# Patient Record
Sex: MALE | Race: WHITE | NOT HISPANIC OR LATINO | Employment: UNEMPLOYED | URBAN - METROPOLITAN AREA
[De-identification: names, ages, dates, MRNs, and addresses within clinical notes are randomized per-mention and may not be internally consistent; named-entity substitution may affect disease eponyms.]

---

## 2018-03-08 ENCOUNTER — DOCUMENTATION (OUTPATIENT)
Dept: PULMONOLOGY | Facility: HOSPITAL | Age: 83
End: 2018-03-08

## 2018-04-17 ENCOUNTER — APPOINTMENT (OUTPATIENT)
Dept: LAB | Facility: HOSPITAL | Age: 83
End: 2018-04-17
Attending: INTERNAL MEDICINE
Payer: COMMERCIAL

## 2018-04-17 ENCOUNTER — TRANSCRIBE ORDERS (OUTPATIENT)
Dept: LAB | Facility: HOSPITAL | Age: 83
End: 2018-04-17

## 2018-04-17 DIAGNOSIS — C61 MALIGNANT NEOPLASM OF PROSTATE (CMS/HCC): ICD-10-CM

## 2018-04-17 DIAGNOSIS — N13.8 ENLARGED PROSTATE WITH URINARY OBSTRUCTION: ICD-10-CM

## 2018-04-17 DIAGNOSIS — K57.92 DIVERTICULITIS OF INTESTINE WITHOUT PERFORATION OR ABSCESS WITHOUT BLEEDING, UNSPECIFIED PART OF INTESTINAL TRACT: ICD-10-CM

## 2018-04-17 DIAGNOSIS — I10 ESSENTIAL HYPERTENSION, MALIGNANT: ICD-10-CM

## 2018-04-17 DIAGNOSIS — E87.5 HYPERPOTASSEMIA: ICD-10-CM

## 2018-04-17 DIAGNOSIS — J18.9 UNRESOLVED PNEUMONIA: ICD-10-CM

## 2018-04-17 DIAGNOSIS — J44.89 OBSTRUCTIVE CHRONIC BRONCHITIS WITHOUT EXACERBATION (CMS/HCC): ICD-10-CM

## 2018-04-17 DIAGNOSIS — N17.9 ACUTE RENAL FAILURE, UNSPECIFIED ACUTE RENAL FAILURE TYPE (CMS/HCC): Primary | ICD-10-CM

## 2018-04-17 DIAGNOSIS — N40.1 ENLARGED PROSTATE WITH URINARY OBSTRUCTION: ICD-10-CM

## 2018-04-17 DIAGNOSIS — N17.9 ACUTE RENAL FAILURE, UNSPECIFIED ACUTE RENAL FAILURE TYPE (CMS/HCC): ICD-10-CM

## 2018-04-17 LAB
ALBUMIN SERPL-MCNC: 3.6 G/DL (ref 3.4–5)
ALP SERPL-CCNC: 58 IU/L (ref 35–126)
ALT SERPL-CCNC: 17 IU/L (ref 16–63)
ANION GAP SERPL CALC-SCNC: 8 MEQ/L (ref 3–15)
AST SERPL-CCNC: 20 IU/L (ref 15–41)
BILIRUB SERPL-MCNC: 0.6 MG/DL (ref 0.3–1.2)
BUN SERPL-MCNC: 18 MG/DL (ref 8–20)
CALCIUM SERPL-MCNC: 9.3 MG/DL (ref 8.9–10.3)
CHLORIDE SERPL-SCNC: 104 MMOL/L (ref 98–109)
CO2 SERPL-SCNC: 26 MMOL/L (ref 22–32)
CREAT SERPL-MCNC: 1.6 MG/DL (ref 0.8–1.3)
GFR SERPL CREATININE-BSD FRML MDRD: 41.8 ML/MIN/1.73M*2
GLUCOSE SERPL-MCNC: 104 MG/DL (ref 70–99)
POTASSIUM SERPL-SCNC: 4.7 MMOL/L (ref 3.6–5.1)
PROT SERPL-MCNC: 6.2 G/DL (ref 6–8.2)
SODIUM SERPL-SCNC: 138 MMOL/L (ref 136–144)

## 2018-04-17 PROCEDURE — 36415 COLL VENOUS BLD VENIPUNCTURE: CPT

## 2018-04-17 PROCEDURE — 80053 COMPREHEN METABOLIC PANEL: CPT

## 2018-04-24 ENCOUNTER — TRANSCRIBE ORDERS (OUTPATIENT)
Dept: SCHEDULING | Age: 83
End: 2018-04-24

## 2018-04-24 DIAGNOSIS — R91.8 LUNG MASS: Primary | ICD-10-CM

## 2018-04-30 ENCOUNTER — TRANSCRIBE ORDERS (OUTPATIENT)
Dept: LAB | Facility: HOSPITAL | Age: 83
End: 2018-04-30

## 2018-04-30 ENCOUNTER — APPOINTMENT (OUTPATIENT)
Dept: LAB | Facility: HOSPITAL | Age: 83
End: 2018-04-30
Attending: INTERNAL MEDICINE
Payer: COMMERCIAL

## 2018-04-30 DIAGNOSIS — N17.9 ACUTE RENAL FAILURE, UNSPECIFIED ACUTE RENAL FAILURE TYPE (CMS/HCC): ICD-10-CM

## 2018-04-30 DIAGNOSIS — N18.30 CHRONIC KIDNEY DISEASE, STAGE III (MODERATE) (CMS/HCC): Primary | ICD-10-CM

## 2018-04-30 DIAGNOSIS — N18.30 CHRONIC KIDNEY DISEASE, STAGE III (MODERATE) (CMS/HCC): ICD-10-CM

## 2018-04-30 LAB
ANION GAP SERPL CALC-SCNC: 9 MEQ/L (ref 3–15)
BUN SERPL-MCNC: 32 MG/DL (ref 8–20)
CALCIUM SERPL-MCNC: 9.3 MG/DL (ref 8.9–10.3)
CHLORIDE SERPL-SCNC: 100 MMOL/L (ref 98–109)
CO2 SERPL-SCNC: 28 MMOL/L (ref 22–32)
CREAT SERPL-MCNC: 1.5 MG/DL (ref 0.8–1.3)
GFR SERPL CREATININE-BSD FRML MDRD: 45 ML/MIN/1.73M*2
GLUCOSE SERPL-MCNC: 120 MG/DL (ref 70–99)
POTASSIUM SERPL-SCNC: 4.6 MMOL/L (ref 3.6–5.1)
SODIUM SERPL-SCNC: 137 MMOL/L (ref 136–144)

## 2018-04-30 PROCEDURE — 36415 COLL VENOUS BLD VENIPUNCTURE: CPT

## 2018-04-30 PROCEDURE — 80048 BASIC METABOLIC PNL TOTAL CA: CPT

## 2018-05-25 ENCOUNTER — ANCILLARY ORDERS (OUTPATIENT)
Dept: LAB | Facility: HOSPITAL | Age: 83
End: 2018-05-25
Payer: COMMERCIAL

## 2018-05-25 DIAGNOSIS — N17.9 ACUTE RENAL FAILURE, UNSPECIFIED ACUTE RENAL FAILURE TYPE (CMS/HCC): ICD-10-CM

## 2018-05-25 DIAGNOSIS — N18.30 CHRONIC KIDNEY DISEASE, STAGE III (MODERATE) (CMS/HCC): ICD-10-CM

## 2018-06-07 ENCOUNTER — APPOINTMENT (RX ONLY)
Dept: URBAN - METROPOLITAN AREA CLINIC 23 | Facility: CLINIC | Age: 83
Setting detail: DERMATOLOGY
End: 2018-06-07

## 2018-06-07 DIAGNOSIS — L56.8 OTHER SPECIFIED ACUTE SKIN CHANGES DUE TO ULTRAVIOLET RADIATION: ICD-10-CM

## 2018-06-07 DIAGNOSIS — D18.0 HEMANGIOMA: ICD-10-CM

## 2018-06-07 DIAGNOSIS — L81.4 OTHER MELANIN HYPERPIGMENTATION: ICD-10-CM

## 2018-06-07 DIAGNOSIS — Z85.828 PERSONAL HISTORY OF OTHER MALIGNANT NEOPLASM OF SKIN: ICD-10-CM | Status: RESOLVED

## 2018-06-07 DIAGNOSIS — L57.0 ACTINIC KERATOSIS: ICD-10-CM

## 2018-06-07 PROBLEM — D48.5 NEOPLASM OF UNCERTAIN BEHAVIOR OF SKIN: Status: ACTIVE | Noted: 2018-06-07

## 2018-06-07 PROBLEM — D18.01 HEMANGIOMA OF SKIN AND SUBCUTANEOUS TISSUE: Status: ACTIVE | Noted: 2018-06-07

## 2018-06-07 PROBLEM — L40.0 PSORIASIS VULGARIS: Status: ACTIVE | Noted: 2018-06-07

## 2018-06-07 PROBLEM — Z85.46 PERSONAL HISTORY OF MALIGNANT NEOPLASM OF PROSTATE: Status: ACTIVE | Noted: 2018-06-07

## 2018-06-07 PROCEDURE — ? LIQUID NITROGEN

## 2018-06-07 PROCEDURE — 99214 OFFICE O/P EST MOD 30 MIN: CPT | Mod: 25

## 2018-06-07 PROCEDURE — 17003 DESTRUCT PREMALG LES 2-14: CPT

## 2018-06-07 PROCEDURE — 17000 DESTRUCT PREMALG LESION: CPT

## 2018-06-07 PROCEDURE — ? BIOPSY BY SHAVE METHOD

## 2018-06-07 PROCEDURE — ? SUNSCREEN RECOMMENDATIONS

## 2018-06-07 PROCEDURE — 11100: CPT | Mod: 59

## 2018-06-07 PROCEDURE — ? COUNSELING

## 2018-06-07 ASSESSMENT — LOCATION DETAILED DESCRIPTION DERM
LOCATION DETAILED: RIGHT ULNAR DORSAL HAND
LOCATION DETAILED: RIGHT VENTRAL PROXIMAL FOREARM
LOCATION DETAILED: RIGHT SUPERIOR MEDIAL UPPER BACK
LOCATION DETAILED: PERIUMBILICAL SKIN
LOCATION DETAILED: RIGHT PROXIMAL POSTERIOR THIGH
LOCATION DETAILED: LEFT RADIAL DORSAL HAND
LOCATION DETAILED: RIGHT ANTERIOR PROXIMAL THIGH
LOCATION DETAILED: LEFT PROXIMAL DORSAL FOREARM
LOCATION DETAILED: LEFT PROXIMAL POSTERIOR THIGH
LOCATION DETAILED: RIGHT PROXIMAL DORSAL FOREARM
LOCATION DETAILED: LEFT VENTRAL PROXIMAL FOREARM
LOCATION DETAILED: LEFT ANTERIOR PROXIMAL THIGH
LOCATION DETAILED: RIGHT RADIAL DORSAL HAND
LOCATION DETAILED: LEFT SUPERIOR UPPER BACK
LOCATION DETAILED: 3RD WEB SPACE RIGHT HAND
LOCATION DETAILED: LEFT ULNAR DORSAL HAND

## 2018-06-07 ASSESSMENT — LOCATION SIMPLE DESCRIPTION DERM
LOCATION SIMPLE: RIGHT HAND
LOCATION SIMPLE: LEFT POSTERIOR THIGH
LOCATION SIMPLE: LEFT FOREARM
LOCATION SIMPLE: LEFT HAND
LOCATION SIMPLE: RIGHT THIGH
LOCATION SIMPLE: LEFT THIGH
LOCATION SIMPLE: LEFT UPPER BACK
LOCATION SIMPLE: RIGHT FOREARM
LOCATION SIMPLE: ABDOMEN
LOCATION SIMPLE: RIGHT POSTERIOR THIGH
LOCATION SIMPLE: RIGHT UPPER BACK

## 2018-06-07 ASSESSMENT — LOCATION ZONE DERM
LOCATION ZONE: TRUNK
LOCATION ZONE: ARM
LOCATION ZONE: HAND
LOCATION ZONE: LEG

## 2018-06-07 NOTE — PROCEDURE: BIOPSY BY SHAVE METHOD
Silver Nitrate Text: The wound bed was treated with silver nitrate after the biopsy was performed.
Anesthesia Type: 1% lidocaine with epinephrine
Lab Facility: 3
Post-Care Instructions: I reviewed with the patient in detail post-care instructions. Patient is to keep the biopsy site dry overnight, and then apply bacitracin twice daily until healed. Patient may apply hydrogen peroxide soaks to remove any crusting.
Billing Type: Third-Party Bill
Hemostasis: Aluminum Chloride
Consent: Written consent was obtained and risks were reviewed including but not limited to scarring, infection, bleeding, scabbing, incomplete removal, nerve damage and allergy to anesthesia.
Additional Anesthesia Volume In Cc (Will Not Render If 0): 0
Bill 42563 For Specimen Handling/Conveyance To Laboratory?: no
Type Of Destruction Used: Curettage
Cryotherapy Text: The wound bed was treated with cryotherapy after the biopsy was performed.
Anesthesia Volume In Cc (Will Not Render If 0): 0.5
Notification Instructions: Patient will be notified of biopsy results. However, patient instructed to call the office if not contacted within 2 weeks.
Detail Level: Detailed
Size Of Lesion In Cm: 0.7
Biopsy Method: Dermablade
Dressing: bandage
Electrodesiccation And Curettage Text: The wound bed was treated with electrodesiccation and curettage after the biopsy was performed.
Biopsy Type: H and E
Curettage Text: The wound bed was treated with curettage after the biopsy was performed.
Was A Bandage Applied: Yes
Wound Care: Vaseline
Electrodesiccation Text: The wound bed was treated with electrodesiccation after the biopsy was performed.

## 2018-07-17 ENCOUNTER — APPOINTMENT (RX ONLY)
Dept: URBAN - METROPOLITAN AREA CLINIC 23 | Facility: CLINIC | Age: 83
Setting detail: DERMATOLOGY
End: 2018-07-17

## 2018-07-17 PROBLEM — D04.71 CARCINOMA IN SITU OF SKIN OF RIGHT LOWER LIMB, INCLUDING HIP: Status: ACTIVE | Noted: 2018-07-17

## 2018-07-17 PROCEDURE — 11602 EXC TR-EXT MAL+MARG 1.1-2 CM: CPT

## 2018-07-17 PROCEDURE — 13121 CMPLX RPR S/A/L 2.6-7.5 CM: CPT

## 2018-07-17 PROCEDURE — ? EXCISION

## 2018-07-17 PROCEDURE — ? PRESCRIPTION

## 2018-07-17 RX ORDER — MUPIROCIN 20 MG/G
OINTMENT TOPICAL
Qty: 1 | Refills: 0 | Status: ERX | COMMUNITY
Start: 2018-07-17

## 2018-07-17 RX ADMIN — MUPIROCIN: 20 OINTMENT TOPICAL at 12:41

## 2018-07-30 ENCOUNTER — APPOINTMENT (RX ONLY)
Dept: URBAN - METROPOLITAN AREA CLINIC 23 | Facility: CLINIC | Age: 83
Setting detail: DERMATOLOGY
End: 2018-07-30

## 2018-07-30 DIAGNOSIS — Z48.02 ENCOUNTER FOR REMOVAL OF SUTURES: ICD-10-CM

## 2018-07-30 PROCEDURE — ? SUTURE REMOVAL (GLOBAL PERIOD)

## 2018-07-30 ASSESSMENT — LOCATION DETAILED DESCRIPTION DERM: LOCATION DETAILED: LEFT DISTAL CALF

## 2018-07-30 ASSESSMENT — LOCATION SIMPLE DESCRIPTION DERM: LOCATION SIMPLE: LEFT CALF

## 2018-07-30 ASSESSMENT — LOCATION ZONE DERM: LOCATION ZONE: LEG

## 2018-07-30 NOTE — PROCEDURE: SUTURE REMOVAL (GLOBAL PERIOD)
Detail Level: Simple
Add 67003 Cpt? (Important Note: In 2017 The Use Of 80214 Is Being Tracked By Cms To Determine Future Global Period Reimbursement For Global Periods): no

## 2018-08-13 ENCOUNTER — HOSPITAL ENCOUNTER (OUTPATIENT)
Dept: RADIOLOGY | Facility: HOSPITAL | Age: 83
Discharge: HOME | End: 2018-08-13
Attending: INTERNAL MEDICINE
Payer: COMMERCIAL

## 2018-08-13 ENCOUNTER — APPOINTMENT (OUTPATIENT)
Dept: LAB | Facility: HOSPITAL | Age: 83
End: 2018-08-13
Attending: INTERNAL MEDICINE
Payer: COMMERCIAL

## 2018-08-13 ENCOUNTER — TRANSCRIBE ORDERS (OUTPATIENT)
Dept: LAB | Facility: HOSPITAL | Age: 83
End: 2018-08-13

## 2018-08-13 DIAGNOSIS — J18.9 PNEUMONIA: ICD-10-CM

## 2018-08-13 DIAGNOSIS — J44.9 CHRONIC OBSTRUCTIVE PULMONARY DISEASE (CMS/HCC): ICD-10-CM

## 2018-08-13 DIAGNOSIS — N40.1 ENLARGED PROSTATE WITH LOWER URINARY TRACT SYMPTOMS (LUTS): ICD-10-CM

## 2018-08-13 DIAGNOSIS — I10 ESSENTIAL (PRIMARY) HYPERTENSION: ICD-10-CM

## 2018-08-13 DIAGNOSIS — C61 MALIGNANT NEOPLASM OF PROSTATE (CMS/HCC): ICD-10-CM

## 2018-08-13 DIAGNOSIS — E87.5 HYPERKALEMIA: ICD-10-CM

## 2018-08-13 DIAGNOSIS — N17.9 ACUTE KIDNEY FAILURE (CMS/HCC): Primary | ICD-10-CM

## 2018-08-13 DIAGNOSIS — N17.9 ACUTE KIDNEY FAILURE (CMS/HCC): ICD-10-CM

## 2018-08-13 DIAGNOSIS — R91.8 LUNG MASS: ICD-10-CM

## 2018-08-13 DIAGNOSIS — K57.92 DIVERTICULITIS OF INTESTINE WITHOUT PERFORATION OR ABSCESS WITHOUT BLEEDING: ICD-10-CM

## 2018-08-13 LAB
ALBUMIN SERPL-MCNC: 3.8 G/DL (ref 3.4–5)
ALP SERPL-CCNC: 74 IU/L (ref 35–126)
ALT SERPL-CCNC: 16 IU/L (ref 16–63)
ANION GAP SERPL CALC-SCNC: 9 MEQ/L (ref 3–15)
AST SERPL-CCNC: 18 IU/L (ref 15–41)
BASOPHILS # BLD: 0.08 K/UL (ref 0.01–0.1)
BASOPHILS NFR BLD: 0.7 %
BILIRUB SERPL-MCNC: 0.8 MG/DL (ref 0.3–1.2)
BILIRUB UR QL STRIP.AUTO: NEGATIVE MG/DL
BUN SERPL-MCNC: 26 MG/DL (ref 8–20)
CALCIUM SERPL-MCNC: 9.6 MG/DL (ref 8.9–10.3)
CHLORIDE SERPL-SCNC: 102 MMOL/L (ref 98–109)
CLARITY UR REFRACT.AUTO: CLEAR
CO2 SERPL-SCNC: 29 MMOL/L (ref 22–32)
COLOR UR AUTO: YELLOW
CREAT SERPL-MCNC: 1.6 MG/DL (ref 0.8–1.3)
CREAT UR-MCNC: 91.6 MG/DL
DIFFERENTIAL METHOD BLD: ABNORMAL
EOSINOPHIL # BLD: 0.52 K/UL (ref 0.04–0.54)
EOSINOPHIL NFR BLD: 4.8 %
ERYTHROCYTE [DISTWIDTH] IN BLOOD BY AUTOMATED COUNT: 14.2 % (ref 11.6–14.4)
GFR SERPL CREATININE-BSD FRML MDRD: 41.8 ML/MIN/1.73M*2
GLUCOSE SERPL-MCNC: 100 MG/DL (ref 70–99)
GLUCOSE UR STRIP.AUTO-MCNC: NEGATIVE MG/DL
HCT VFR BLDCO AUTO: 43.7 % (ref 40.1–51)
HGB BLD-MCNC: 14.7 G/DL (ref 13.7–17.5)
HGB UR QL STRIP.AUTO: NEGATIVE
IMM GRANULOCYTES # BLD AUTO: 0.06 K/UL (ref 0–0.08)
IMM GRANULOCYTES NFR BLD AUTO: 0.6 %
KETONES UR STRIP.AUTO-MCNC: NEGATIVE MG/DL
LEUKOCYTE ESTERASE UR QL STRIP.AUTO: NEGATIVE
LYMPHOCYTES # BLD: 2.23 K/UL (ref 1.2–3.5)
LYMPHOCYTES NFR BLD: 20.6 %
MCH RBC QN AUTO: 31.6 PG (ref 28–33.2)
MCHC RBC AUTO-ENTMCNC: 33.6 G/DL (ref 32.2–36.5)
MCV RBC AUTO: 94 FL (ref 83–98)
MONOCYTES # BLD: 1.29 K/UL (ref 0.3–1)
MONOCYTES NFR BLD: 11.9 %
NEUTROPHILS # BLD: 6.63 K/UL (ref 1.7–7)
NEUTS SEG NFR BLD: 61.4 %
NITRITE UR QL STRIP.AUTO: NEGATIVE
NRBC BLD-RTO: 0 %
PDW BLD AUTO: 9.9 FL (ref 9.4–12.4)
PH UR STRIP.AUTO: 6 [PH]
PLATELET # BLD AUTO: 283 K/UL (ref 150–350)
POTASSIUM SERPL-SCNC: 4.4 MMOL/L (ref 3.6–5.1)
PROT SERPL-MCNC: 6.5 G/DL (ref 6–8.2)
PROT UR QL STRIP.AUTO: NEGATIVE
PROT UR-MCNC: 11 MG/DL
PROT/CREAT UR: 0.12 MG/G CREAT
RBC # BLD AUTO: 4.65 M/UL (ref 4.5–5.8)
SODIUM SERPL-SCNC: 140 MMOL/L (ref 136–144)
SP GR UR REFRACT.AUTO: 1.02
UROBILINOGEN UR STRIP-ACNC: 0.2 EU/DL
WBC # BLD AUTO: 10.81 K/UL (ref 3.8–10.5)

## 2018-08-13 PROCEDURE — 85025 COMPLETE CBC W/AUTO DIFF WBC: CPT

## 2018-08-13 PROCEDURE — 36415 COLL VENOUS BLD VENIPUNCTURE: CPT

## 2018-08-13 PROCEDURE — 71250 CT THORAX DX C-: CPT

## 2018-08-13 PROCEDURE — 84156 ASSAY OF PROTEIN URINE: CPT

## 2018-08-13 PROCEDURE — 80053 COMPREHEN METABOLIC PANEL: CPT

## 2018-08-13 PROCEDURE — 81003 URINALYSIS AUTO W/O SCOPE: CPT

## 2018-08-15 ENCOUNTER — TRANSCRIBE ORDERS (OUTPATIENT)
Dept: LAB | Facility: HOSPITAL | Age: 83
End: 2018-08-15

## 2018-08-15 ENCOUNTER — APPOINTMENT (OUTPATIENT)
Dept: LAB | Facility: HOSPITAL | Age: 83
End: 2018-08-15
Attending: NURSE PRACTITIONER
Payer: COMMERCIAL

## 2018-08-15 DIAGNOSIS — J18.1 LOBAR PNEUMONIA (CMS/HCC): ICD-10-CM

## 2018-08-15 DIAGNOSIS — J18.1 LOBAR PNEUMONIA (CMS/HCC): Primary | ICD-10-CM

## 2018-08-15 LAB
GRAM STN SPEC: NORMAL

## 2018-08-15 PROCEDURE — 87205 SMEAR GRAM STAIN: CPT

## 2018-08-15 PROCEDURE — 87102 FUNGUS ISOLATION CULTURE: CPT

## 2018-08-15 PROCEDURE — 87070 CULTURE OTHR SPECIMN AEROBIC: CPT

## 2018-08-15 PROCEDURE — 87206 SMEAR FLUORESCENT/ACID STAI: CPT | Mod: 59

## 2018-08-15 PROCEDURE — 87206 SMEAR FLUORESCENT/ACID STAI: CPT

## 2018-08-15 PROCEDURE — 87015 SPECIMEN INFECT AGNT CONCNTJ: CPT

## 2018-08-16 LAB
FUNGUS STAIN: NORMAL
RHODAMINE-AURAMINE STN SPEC: NORMAL

## 2018-08-17 LAB — MICROORGANISM SPEC CULT: NORMAL

## 2018-08-27 ENCOUNTER — HOSPITAL ENCOUNTER (OUTPATIENT)
Dept: RADIOLOGY | Facility: HOSPITAL | Age: 83
Discharge: HOME | End: 2018-08-27
Attending: INTERNAL MEDICINE
Payer: COMMERCIAL

## 2018-08-27 ENCOUNTER — TRANSCRIBE ORDERS (OUTPATIENT)
Dept: SCHEDULING | Age: 83
End: 2018-08-27

## 2018-08-27 DIAGNOSIS — R10.84 GENERALIZED ABDOMINAL PAIN: Primary | ICD-10-CM

## 2018-08-27 DIAGNOSIS — R10.84 GENERALIZED ABDOMINAL PAIN: ICD-10-CM

## 2018-08-27 PROCEDURE — 74018 RADEX ABDOMEN 1 VIEW: CPT

## 2018-09-13 LAB
FUNGUS SPEC CULT: ABNORMAL
FUNGUS SPEC CULT: ABNORMAL

## 2018-09-27 LAB — MYCOBACTERIUM SPEC CULT: NORMAL

## 2018-11-13 ENCOUNTER — TRANSCRIBE ORDERS (OUTPATIENT)
Dept: LAB | Facility: HOSPITAL | Age: 83
End: 2018-11-13

## 2018-11-13 ENCOUNTER — APPOINTMENT (OUTPATIENT)
Dept: LAB | Facility: HOSPITAL | Age: 83
End: 2018-11-13
Attending: INTERNAL MEDICINE
Payer: COMMERCIAL

## 2018-11-13 DIAGNOSIS — I10 ESSENTIAL (PRIMARY) HYPERTENSION: ICD-10-CM

## 2018-11-13 DIAGNOSIS — J44.9 CHRONIC OBSTRUCTIVE PULMONARY DISEASE (CMS/HCC): ICD-10-CM

## 2018-11-13 DIAGNOSIS — E87.5 HYPERKALEMIA: ICD-10-CM

## 2018-11-13 DIAGNOSIS — N40.1 ENLARGED PROSTATE WITH LOWER URINARY TRACT SYMPTOMS (LUTS): ICD-10-CM

## 2018-11-13 DIAGNOSIS — N17.9 ACUTE KIDNEY FAILURE (CMS/HCC): Primary | ICD-10-CM

## 2018-11-13 DIAGNOSIS — K57.92 DIVERTICULITIS OF INTESTINE WITHOUT PERFORATION OR ABSCESS WITHOUT BLEEDING: ICD-10-CM

## 2018-11-13 DIAGNOSIS — N17.9 ACUTE KIDNEY FAILURE (CMS/HCC): ICD-10-CM

## 2018-11-13 DIAGNOSIS — C61 MALIGNANT NEOPLASM OF PROSTATE (CMS/HCC): ICD-10-CM

## 2018-11-13 LAB
ALBUMIN SERPL-MCNC: 3.6 G/DL (ref 3.4–5)
ALP SERPL-CCNC: 66 IU/L (ref 35–126)
ALT SERPL-CCNC: 14 IU/L (ref 16–63)
ANION GAP SERPL CALC-SCNC: 9 MEQ/L (ref 3–15)
AST SERPL-CCNC: 15 IU/L (ref 15–41)
BACTERIA URNS QL MICRO: NORMAL /HPF
BASOPHILS # BLD: 0.07 K/UL (ref 0.01–0.1)
BASOPHILS NFR BLD: 0.6 %
BILIRUB SERPL-MCNC: 0.7 MG/DL (ref 0.3–1.2)
BILIRUB UR QL STRIP.AUTO: NEGATIVE MG/DL
BUN SERPL-MCNC: 27 MG/DL (ref 8–20)
CALCIUM SERPL-MCNC: 9.1 MG/DL (ref 8.9–10.3)
CHLORIDE SERPL-SCNC: 102 MEQ/L (ref 98–109)
CLARITY UR REFRACT.AUTO: CLEAR
CO2 SERPL-SCNC: 24 MEQ/L (ref 22–32)
COLOR UR AUTO: YELLOW
CREAT SERPL-MCNC: 1.4 MG/DL (ref 0.8–1.3)
CREAT UR-MCNC: 88.4 MG/DL
DIFFERENTIAL METHOD BLD: ABNORMAL
EOSINOPHIL # BLD: 0.08 K/UL (ref 0.04–0.54)
EOSINOPHIL NFR BLD: 0.7 %
ERYTHROCYTE [DISTWIDTH] IN BLOOD BY AUTOMATED COUNT: 13.2 % (ref 11.6–14.4)
GFR SERPL CREATININE-BSD FRML MDRD: 48.6 ML/MIN/1.73M*2
GLUCOSE SERPL-MCNC: 84 MG/DL (ref 70–99)
GLUCOSE UR STRIP.AUTO-MCNC: NEGATIVE MG/DL
HCT VFR BLDCO AUTO: 42.9 % (ref 40.1–51)
HGB BLD-MCNC: 14.6 G/DL (ref 13.7–17.5)
HGB UR QL STRIP.AUTO: NEGATIVE
HYALINE CASTS #/AREA URNS LPF: NORMAL /LPF
IMM GRANULOCYTES # BLD AUTO: 0.06 K/UL (ref 0–0.08)
IMM GRANULOCYTES NFR BLD AUTO: 0.5 %
KETONES UR STRIP.AUTO-MCNC: NEGATIVE MG/DL
LEUKOCYTE ESTERASE UR QL STRIP.AUTO: NEGATIVE
LYMPHOCYTES # BLD: 1.57 K/UL (ref 1.2–3.5)
LYMPHOCYTES NFR BLD: 12.9 %
MCH RBC QN AUTO: 31.4 PG (ref 28–33.2)
MCHC RBC AUTO-ENTMCNC: 34 G/DL (ref 32.2–36.5)
MCV RBC AUTO: 92.3 FL (ref 83–98)
MONOCYTES # BLD: 1.3 K/UL (ref 0.3–1)
MONOCYTES NFR BLD: 10.7 %
NEUTROPHILS # BLD: 9.06 K/UL (ref 1.7–7)
NEUTS SEG NFR BLD: 74.6 %
NITRITE UR QL STRIP.AUTO: NEGATIVE
NRBC BLD-RTO: 0 %
PDW BLD AUTO: 10.2 FL (ref 9.4–12.4)
PH UR STRIP.AUTO: 6 [PH]
PLATELET # BLD AUTO: 283 K/UL (ref 150–350)
POTASSIUM SERPL-SCNC: 4.4 MEQ/L (ref 3.6–5.1)
PROT SERPL-MCNC: 5.9 G/DL (ref 6–8.2)
PROT UR QL STRIP.AUTO: NEGATIVE
PROT UR-MCNC: 10 MG/DL
PROT/CREAT UR: 0.11 MG/G CREAT
RBC # BLD AUTO: 4.65 M/UL (ref 4.5–5.8)
RBC #/AREA URNS HPF: NORMAL /HPF
SODIUM SERPL-SCNC: 135 MEQ/L (ref 136–144)
SP GR UR REFRACT.AUTO: 1.02
SQUAMOUS URNS QL MICRO: NORMAL /HPF
UROBILINOGEN UR STRIP-ACNC: 0.2 EU/DL
WBC # BLD AUTO: 12.14 K/UL (ref 3.8–10.5)
WBC #/AREA URNS HPF: NORMAL /HPF

## 2018-11-13 PROCEDURE — 85025 COMPLETE CBC W/AUTO DIFF WBC: CPT

## 2018-11-13 PROCEDURE — 82570 ASSAY OF URINE CREATININE: CPT

## 2018-11-13 PROCEDURE — 80053 COMPREHEN METABOLIC PANEL: CPT

## 2018-11-13 PROCEDURE — 81003 URINALYSIS AUTO W/O SCOPE: CPT

## 2018-11-13 PROCEDURE — 36415 COLL VENOUS BLD VENIPUNCTURE: CPT

## 2018-11-21 ENCOUNTER — TRANSCRIBE ORDERS (OUTPATIENT)
Dept: SCHEDULING | Age: 83
End: 2018-11-21

## 2018-11-21 DIAGNOSIS — R91.8 OTHER NONSPECIFIC ABNORMAL FINDING OF LUNG FIELD: Primary | ICD-10-CM

## 2018-12-17 ENCOUNTER — HOSPITAL ENCOUNTER (OUTPATIENT)
Dept: RADIOLOGY | Facility: HOSPITAL | Age: 83
Discharge: HOME | End: 2018-12-17
Attending: INTERNAL MEDICINE
Payer: COMMERCIAL

## 2018-12-17 ENCOUNTER — TRANSCRIBE ORDERS (OUTPATIENT)
Dept: LAB | Facility: HOSPITAL | Age: 83
End: 2018-12-17

## 2018-12-17 ENCOUNTER — APPOINTMENT (OUTPATIENT)
Dept: LAB | Facility: HOSPITAL | Age: 83
End: 2018-12-17
Attending: INTERNAL MEDICINE
Payer: COMMERCIAL

## 2018-12-17 DIAGNOSIS — N40.1 ENLARGED PROSTATE WITH LOWER URINARY TRACT SYMPTOMS (LUTS): ICD-10-CM

## 2018-12-17 DIAGNOSIS — N17.9 ACUTE KIDNEY FAILURE (CMS/HCC): ICD-10-CM

## 2018-12-17 DIAGNOSIS — K57.92 DIVERTICULITIS OF INTESTINE WITHOUT PERFORATION OR ABSCESS WITHOUT BLEEDING: ICD-10-CM

## 2018-12-17 DIAGNOSIS — E87.5 HYPERKALEMIA: ICD-10-CM

## 2018-12-17 DIAGNOSIS — I10 ESSENTIAL (PRIMARY) HYPERTENSION: ICD-10-CM

## 2018-12-17 DIAGNOSIS — C61 MALIGNANT NEOPLASM OF PROSTATE (CMS/HCC): Primary | ICD-10-CM

## 2018-12-17 DIAGNOSIS — J44.9 CHRONIC OBSTRUCTIVE PULMONARY DISEASE (CMS/HCC): ICD-10-CM

## 2018-12-17 DIAGNOSIS — R91.8 OTHER NONSPECIFIC ABNORMAL FINDING OF LUNG FIELD: ICD-10-CM

## 2018-12-17 DIAGNOSIS — N18.30 CHRONIC KIDNEY DISEASE, STAGE III (MODERATE) (CMS/HCC): ICD-10-CM

## 2018-12-17 DIAGNOSIS — J18.9 PNEUMONIA: ICD-10-CM

## 2018-12-17 DIAGNOSIS — C61 MALIGNANT NEOPLASM OF PROSTATE (CMS/HCC): ICD-10-CM

## 2018-12-17 LAB
ANION GAP SERPL CALC-SCNC: 8 MEQ/L (ref 3–15)
BUN SERPL-MCNC: 29 MG/DL (ref 8–20)
CALCIUM SERPL-MCNC: 8.9 MG/DL (ref 8.9–10.3)
CHLORIDE SERPL-SCNC: 105 MEQ/L (ref 98–109)
CO2 SERPL-SCNC: 27 MEQ/L (ref 22–32)
CREAT SERPL-MCNC: 1.5 MG/DL
GFR SERPL CREATININE-BSD FRML MDRD: 44.9 ML/MIN/1.73M*2
GLUCOSE SERPL-MCNC: 100 MG/DL (ref 70–99)
POTASSIUM SERPL-SCNC: 4.5 MEQ/L (ref 3.6–5.1)
SODIUM SERPL-SCNC: 140 MEQ/L (ref 136–144)

## 2018-12-17 PROCEDURE — 80048 BASIC METABOLIC PNL TOTAL CA: CPT | Performed by: INTERNAL MEDICINE

## 2018-12-17 PROCEDURE — 36415 COLL VENOUS BLD VENIPUNCTURE: CPT | Performed by: INTERNAL MEDICINE

## 2018-12-17 PROCEDURE — 71250 CT THORAX DX C-: CPT

## 2019-04-11 ENCOUNTER — APPOINTMENT (EMERGENCY)
Dept: RADIOLOGY | Facility: HOSPITAL | Age: 84
DRG: 191 | End: 2019-04-11
Payer: COMMERCIAL

## 2019-04-11 ENCOUNTER — HOSPITAL ENCOUNTER (INPATIENT)
Facility: HOSPITAL | Age: 84
LOS: 4 days | Discharge: HOME | DRG: 191 | End: 2019-04-15
Attending: EMERGENCY MEDICINE | Admitting: INTERNAL MEDICINE
Payer: COMMERCIAL

## 2019-04-11 ENCOUNTER — APPOINTMENT (INPATIENT)
Dept: RADIOLOGY | Facility: HOSPITAL | Age: 84
DRG: 191 | End: 2019-04-11
Attending: EMERGENCY MEDICINE
Payer: COMMERCIAL

## 2019-04-11 DIAGNOSIS — J44.1 COPD EXACERBATION (CMS/HCC): Primary | ICD-10-CM

## 2019-04-11 PROBLEM — I10 HYPERTENSION: Status: ACTIVE | Noted: 2019-04-11

## 2019-04-11 PROBLEM — N18.30 CHRONIC KIDNEY DISEASE (CKD), STAGE III (MODERATE) (CMS/HCC): Status: ACTIVE | Noted: 2019-04-11

## 2019-04-11 PROBLEM — R10.9 ABDOMINAL PAIN: Status: ACTIVE | Noted: 2019-04-11

## 2019-04-11 LAB
ANION GAP SERPL CALC-SCNC: 11 MEQ/L (ref 3–15)
BASOPHILS # BLD: 0.04 K/UL (ref 0.01–0.1)
BASOPHILS NFR BLD: 0.4 %
BNP SERPL-MCNC: 74 PG/ML
BUN SERPL-MCNC: 21 MG/DL (ref 8–20)
CALCIUM SERPL-MCNC: 8.8 MG/DL (ref 8.9–10.3)
CHLORIDE SERPL-SCNC: 103 MEQ/L (ref 98–109)
CO2 SERPL-SCNC: 24 MEQ/L (ref 22–32)
CREAT SERPL-MCNC: 1.2 MG/DL
DIFFERENTIAL METHOD BLD: ABNORMAL
EOSINOPHIL # BLD: 0.4 K/UL (ref 0.04–0.54)
EOSINOPHIL NFR BLD: 3.6 %
ERYTHROCYTE [DISTWIDTH] IN BLOOD BY AUTOMATED COUNT: 13.5 % (ref 11.6–14.4)
GFR SERPL CREATININE-BSD FRML MDRD: 58.1 ML/MIN/1.73M*2
GLUCOSE SERPL-MCNC: 111 MG/DL (ref 70–99)
HCT VFR BLDCO AUTO: 43.1 %
HGB BLD-MCNC: 14.5 G/DL
IMM GRANULOCYTES # BLD AUTO: 0.07 K/UL (ref 0–0.08)
IMM GRANULOCYTES NFR BLD AUTO: 0.6 %
LIPASE SERPL-CCNC: 26 U/L (ref 20–51)
LYMPHOCYTES # BLD: 1.15 K/UL (ref 1.2–3.5)
LYMPHOCYTES NFR BLD: 10.5 %
MCH RBC QN AUTO: 32.4 PG (ref 28–33.2)
MCHC RBC AUTO-ENTMCNC: 33.6 G/DL (ref 32.2–36.5)
MCV RBC AUTO: 96.2 FL (ref 83–98)
MONOCYTES # BLD: 1.03 K/UL (ref 0.3–1)
MONOCYTES NFR BLD: 9.4 %
NEUTROPHILS # BLD: 8.28 K/UL (ref 1.7–7)
NEUTS SEG NFR BLD: 75.5 %
NRBC BLD-RTO: 0 %
PDW BLD AUTO: 9.3 FL (ref 9.4–12.4)
PLATELET # BLD AUTO: 314 K/UL
POTASSIUM SERPL-SCNC: 3.9 MEQ/L (ref 3.6–5.1)
RBC # BLD AUTO: 4.48 M/UL (ref 4.5–5.8)
SODIUM SERPL-SCNC: 138 MEQ/L (ref 136–144)
TROPONIN I SERPL-MCNC: <0.03 NG/ML
WBC # BLD AUTO: 10.97 K/UL

## 2019-04-11 PROCEDURE — 84484 ASSAY OF TROPONIN QUANT: CPT

## 2019-04-11 PROCEDURE — 36415 COLL VENOUS BLD VENIPUNCTURE: CPT

## 2019-04-11 PROCEDURE — 85025 COMPLETE CBC W/AUTO DIFF WBC: CPT

## 2019-04-11 PROCEDURE — 83690 ASSAY OF LIPASE: CPT | Performed by: STUDENT IN AN ORGANIZED HEALTH CARE EDUCATION/TRAINING PROGRAM

## 2019-04-11 PROCEDURE — 93005 ELECTROCARDIOGRAM TRACING: CPT

## 2019-04-11 PROCEDURE — 85025 COMPLETE CBC W/AUTO DIFF WBC: CPT | Performed by: EMERGENCY MEDICINE

## 2019-04-11 PROCEDURE — 25000000 HC PHARMACY GENERAL: Performed by: EMERGENCY MEDICINE

## 2019-04-11 PROCEDURE — 63600000 HC DRUGS/DETAIL CODE: Performed by: EMERGENCY MEDICINE

## 2019-04-11 PROCEDURE — 84484 ASSAY OF TROPONIN QUANT: CPT | Performed by: EMERGENCY MEDICINE

## 2019-04-11 PROCEDURE — 80048 BASIC METABOLIC PNL TOTAL CA: CPT | Performed by: EMERGENCY MEDICINE

## 2019-04-11 PROCEDURE — 83880 ASSAY OF NATRIURETIC PEPTIDE: CPT | Performed by: EMERGENCY MEDICINE

## 2019-04-11 PROCEDURE — 93005 ELECTROCARDIOGRAM TRACING: CPT | Performed by: EMERGENCY MEDICINE

## 2019-04-11 PROCEDURE — 25000000 HC PHARMACY GENERAL: Performed by: STUDENT IN AN ORGANIZED HEALTH CARE EDUCATION/TRAINING PROGRAM

## 2019-04-11 PROCEDURE — 71046 X-RAY EXAM CHEST 2 VIEWS: CPT

## 2019-04-11 PROCEDURE — 83880 ASSAY OF NATRIURETIC PEPTIDE: CPT

## 2019-04-11 PROCEDURE — 63600105 HC IODINE BASED CONTRAST: Performed by: EMERGENCY MEDICINE

## 2019-04-11 PROCEDURE — 74177 CT ABD & PELVIS W/CONTRAST: CPT

## 2019-04-11 PROCEDURE — 80048 BASIC METABOLIC PNL TOTAL CA: CPT

## 2019-04-11 PROCEDURE — 71260 CT THORAX DX C+: CPT

## 2019-04-11 PROCEDURE — 99285 EMERGENCY DEPT VISIT HI MDM: CPT | Mod: 25

## 2019-04-11 PROCEDURE — 21400000 HC ROOM AND CARE CCU/INTERMEDIATE

## 2019-04-11 PROCEDURE — 99223 1ST HOSP IP/OBS HIGH 75: CPT | Performed by: INTERNAL MEDICINE

## 2019-04-11 RX ORDER — FAMOTIDINE 20 MG/1
40 TABLET, FILM COATED ORAL DAILY
Status: DISCONTINUED | OUTPATIENT
Start: 2019-04-12 | End: 2019-04-11

## 2019-04-11 RX ORDER — ATORVASTATIN CALCIUM 10 MG/1
10 TABLET, FILM COATED ORAL DAILY
COMMUNITY

## 2019-04-11 RX ORDER — FAMOTIDINE 20 MG/1
20 TABLET, FILM COATED ORAL DAILY
Status: DISCONTINUED | OUTPATIENT
Start: 2019-04-12 | End: 2019-04-15 | Stop reason: HOSPADM

## 2019-04-11 RX ORDER — TIOTROPIUM BROMIDE 18 UG/1
18 CAPSULE ORAL; RESPIRATORY (INHALATION) DAILY
COMMUNITY
End: 2020-01-07 | Stop reason: SDUPTHER

## 2019-04-11 RX ORDER — IPRATROPIUM BROMIDE AND ALBUTEROL SULFATE 2.5; .5 MG/3ML; MG/3ML
3 SOLUTION RESPIRATORY (INHALATION) ONCE
Status: COMPLETED | OUTPATIENT
Start: 2019-04-11 | End: 2019-04-11

## 2019-04-11 RX ORDER — CARVEDILOL 3.12 MG/1
3.12 TABLET ORAL 2 TIMES DAILY
COMMUNITY
End: 2019-04-15 | Stop reason: HOSPADM

## 2019-04-11 RX ORDER — IBUPROFEN 200 MG
16-32 TABLET ORAL AS NEEDED
Status: DISCONTINUED | OUTPATIENT
Start: 2019-04-11 | End: 2019-04-15 | Stop reason: HOSPADM

## 2019-04-11 RX ORDER — FINASTERIDE 5 MG/1
5 TABLET, FILM COATED ORAL DAILY
Status: DISCONTINUED | OUTPATIENT
Start: 2019-04-12 | End: 2019-04-15 | Stop reason: HOSPADM

## 2019-04-11 RX ORDER — IPRATROPIUM BROMIDE AND ALBUTEROL SULFATE 2.5; .5 MG/3ML; MG/3ML
3 SOLUTION RESPIRATORY (INHALATION)
Status: DISCONTINUED | OUTPATIENT
Start: 2019-04-11 | End: 2019-04-15 | Stop reason: HOSPADM

## 2019-04-11 RX ORDER — PANTOPRAZOLE SODIUM 20 MG/1
20 TABLET, DELAYED RELEASE ORAL DAILY
Status: DISCONTINUED | OUTPATIENT
Start: 2019-04-12 | End: 2019-04-15 | Stop reason: HOSPADM

## 2019-04-11 RX ORDER — FAMOTIDINE 40 MG/1
40 TABLET, FILM COATED ORAL 2 TIMES DAILY
COMMUNITY
Start: 2019-03-29

## 2019-04-11 RX ORDER — CARVEDILOL 3.12 MG/1
3.12 TABLET ORAL 2 TIMES DAILY WITH MEALS
Status: DISCONTINUED | OUTPATIENT
Start: 2019-04-12 | End: 2019-04-12

## 2019-04-11 RX ORDER — DEXTROSE 40 %
15-30 GEL (GRAM) ORAL AS NEEDED
Status: DISCONTINUED | OUTPATIENT
Start: 2019-04-11 | End: 2019-04-15 | Stop reason: HOSPADM

## 2019-04-11 RX ORDER — TAMSULOSIN HYDROCHLORIDE 0.4 MG/1
0.4 CAPSULE ORAL 2 TIMES DAILY
COMMUNITY

## 2019-04-11 RX ORDER — FINASTERIDE 5 MG/1
5 TABLET, FILM COATED ORAL DAILY
COMMUNITY

## 2019-04-11 RX ORDER — GUAIFENESIN 100 MG/5ML
200 SOLUTION ORAL EVERY 6 HOURS PRN
Status: DISCONTINUED | OUTPATIENT
Start: 2019-04-11 | End: 2019-04-15 | Stop reason: HOSPADM

## 2019-04-11 RX ORDER — HEPARIN SODIUM 5000 [USP'U]/ML
5000 INJECTION, SOLUTION INTRAVENOUS; SUBCUTANEOUS EVERY 8 HOURS
Status: DISCONTINUED | OUTPATIENT
Start: 2019-04-12 | End: 2019-04-15 | Stop reason: HOSPADM

## 2019-04-11 RX ORDER — OMEPRAZOLE 20 MG/1
20 CAPSULE, DELAYED RELEASE ORAL
COMMUNITY

## 2019-04-11 RX ORDER — DEXTROSE 50 % IN WATER (D50W) INTRAVENOUS SYRINGE
25 AS NEEDED
Status: DISCONTINUED | OUTPATIENT
Start: 2019-04-11 | End: 2019-04-15 | Stop reason: HOSPADM

## 2019-04-11 RX ORDER — BUDESONIDE 0.5 MG/2ML
0.5 INHALANT ORAL
Status: DISCONTINUED | OUTPATIENT
Start: 2019-04-11 | End: 2019-04-15 | Stop reason: HOSPADM

## 2019-04-11 RX ORDER — BENZONATATE 100 MG/1
100 CAPSULE ORAL 3 TIMES DAILY PRN
Status: DISCONTINUED | OUTPATIENT
Start: 2019-04-11 | End: 2019-04-15 | Stop reason: HOSPADM

## 2019-04-11 RX ORDER — ATORVASTATIN CALCIUM 10 MG/1
10 TABLET, FILM COATED ORAL DAILY
Status: DISCONTINUED | OUTPATIENT
Start: 2019-04-12 | End: 2019-04-15 | Stop reason: HOSPADM

## 2019-04-11 RX ORDER — TAMSULOSIN HYDROCHLORIDE 0.4 MG/1
0.4 CAPSULE ORAL DAILY
Status: DISCONTINUED | OUTPATIENT
Start: 2019-04-12 | End: 2019-04-15 | Stop reason: HOSPADM

## 2019-04-11 RX ORDER — MONTELUKAST SODIUM 10 MG/1
10 TABLET ORAL NIGHTLY
COMMUNITY

## 2019-04-11 RX ADMIN — IPRATROPIUM BROMIDE AND ALBUTEROL SULFATE 3 ML: 2.5; .5 SOLUTION RESPIRATORY (INHALATION) at 22:05

## 2019-04-11 RX ADMIN — IPRATROPIUM BROMIDE AND ALBUTEROL SULFATE 3 ML: 2.5; .5 SOLUTION RESPIRATORY (INHALATION) at 18:08

## 2019-04-11 RX ADMIN — METHYLPREDNISOLONE SODIUM SUCCINATE 40 MG: 40 INJECTION, POWDER, FOR SOLUTION INTRAMUSCULAR; INTRAVENOUS at 18:17

## 2019-04-11 RX ADMIN — AZITHROMYCIN DIHYDRATE 500 MG: 500 INJECTION, POWDER, LYOPHILIZED, FOR SOLUTION INTRAVENOUS at 18:10

## 2019-04-11 RX ADMIN — IOHEXOL 100 ML: 350 INJECTION, SOLUTION INTRAVENOUS at 20:14

## 2019-04-11 RX ADMIN — BUDESONIDE 0.5 MG: 0.5 INHALANT ORAL at 22:55

## 2019-04-11 RX ADMIN — IPRATROPIUM BROMIDE AND ALBUTEROL SULFATE 3 ML: 2.5; .5 SOLUTION RESPIRATORY (INHALATION) at 19:46

## 2019-04-11 ASSESSMENT — COGNITIVE AND FUNCTIONAL STATUS - GENERAL
HELP NEEDED FOR BATHING: 4 - NONE
MOVING TO AND FROM BED TO CHAIR: 4 - NONE
CLIMB 3 TO 5 STEPS WITH RAILING: 4 - NONE
WALKING IN HOSPITAL ROOM: 4 - NONE
STANDING UP FROM CHAIR USING ARMS: 4 - NONE
DRESSING REGULAR UPPER BODY CLOTHING: 4 - NONE
TOILETING: 4 - NONE
HELP NEEDED FOR PERSONAL GROOMING: 4 - NONE
DRESSING REGULAR LOWER BODY CLOTHING: 4 - NONE
EATING MEALS: 4 - NONE

## 2019-04-11 ASSESSMENT — ENCOUNTER SYMPTOMS
HEADACHES: 0
ARTHRALGIAS: 0
FEVER: 0
WHEEZING: 1
COUGH: 1
VOMITING: 0
PALPITATIONS: 0
SORE THROAT: 0
BACK PAIN: 0
FATIGUE: 0
ABDOMINAL PAIN: 0
COLOR CHANGE: 0
TROUBLE SWALLOWING: 0
DYSURIA: 0
SHORTNESS OF BREATH: 1
FLANK PAIN: 0

## 2019-04-11 NOTE — H&P
"     Internal Medicine  History & Physical        CHIEF COMPLAINT   SOB     HISTORY OF PRESENT ILLNESS      Mr. 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    Patient has hx multiple COPD exacerbations. Recently returned from [place] where he states he had two exacerbations that were caused by URI. He was treated with abx and prednisone. He states he never really recovered to his baseline. For the past week he has had worsening SOB with minimal exertion. He has a cough productive of clear sputum. States that he has never been this tight. Also notes onset of epigastric abdominal pain over the past few weeks associated with distension. Normal bowel movements. Pain starts in epigastric and radiates to umbilicus when he coughs. Denies F/c, chest pain, palpitations, N/V/D. He notes increased leg selling over the past few weeks. Long travel from [place]. No unilateral leg swelling.    Has history of DRESS syndrome after vanc in 2015. Had reoccurrence in 2017 after he was given oral vanc for C diff following hospitalization for pseudomonal pna. Currently living with wife, does not smoke or drink. Retired.     Micro hx:   9/2016 - MRSA pneumonia  1/2017 - MRSA pneumonia Rxed with linezolid  3/2017 - urosepsis with ecoli bacteremia  3/2017 - pseudomonas pneumonia  3/2017 - Cdiff colitis      Vitals  BP: (!) 188/99  Temp: 36.9 °C (98.5 °F)  Temp Source: Oral  Heart Rate: (!) 105  Resp: 20  SpO2: 94 %  Height: 165.1 cm (5' 5\")  Weight: 79.4 kg (175 lb)    ED given duoneb, azithro and solumedrol  PAST MEDICAL AND SURGICAL HISTORY      PMHx:  No past medical history on file.    PSHx:  No past surgical history on file.    PCP:   Bella Walters MD    MEDICATIONS      Prior to Admission medications    Not on File       Home medications were personally " reviewed.    ALLERGIES      Vancomycin    FAMILY HISTORY      No family history on file.    SOCIAL HISTORY      Social History     Social History   • Marital status:      Spouse name: N/A   • Number of children: N/A   • Years of education: N/A     Social History Main Topics   • Smoking status: Not on file   • Smokeless tobacco: Not on file   • Alcohol use Not on file   • Drug use: Unknown   • Sexual activity: Not on file     Other Topics Concern   • Not on file     Social History Narrative   • No narrative on file       REVIEW OF SYSTEMS      Review of Systems   All other systems reviewed and are negative.      PHYSICAL EXAMINATION      Temp:  [36.4 °C (97.5 °F)-37 °C (98.6 °F)] 36.4 °C (97.5 °F)  Heart Rate:  [101-106] 104  Resp:  [20-24] 20  BP: (144-188)/() 185/94  Body mass index is 29.12 kg/m².    Physical Exam  General: No acute distress, comfortable appearing, cushingoid appearing  HEENT: AT/NC, EOMI, PERRLA, anicteric sclera, MMM  CV: Regular rate and rhythm, no murmurs rubs or gallops, no JVP  Resp: Diffuse wheezing  Abdomen: distended, epigastric tenderness, +BS  : Nonpalpable bladder, nontender  Extremities: trace edema,+2 palpable pulses throughout all extremities bilaterally  Skin: warm & dry, no lesions or ecchymosis  Neuro: Alert and oriented x3, moves all extremities, no gross neurological deficits  Psych: pleasant, normal affect\  LABS / IMAGING / EKG        Labs    Results from last 7 days  Lab Units 04/11/19  1437   WBC K/uL 10.97*   HEMOGLOBIN g/dL 14.5   HEMATOCRIT % 43.1   PLATELETS K/uL 314         Results from last 7 days  Lab Units 04/11/19  1437   SODIUM mEQ/L 138   POTASSIUM mEQ/L 3.9   CHLORIDE mEQ/L 103   CO2 mEQ/L 24   BUN mg/dL 21*   CREATININE mg/dL 1.2   CALCIUM mg/dL 8.8*   GLUCOSE mg/dL 111*     BNP 74    Imaging  I have independently reviewed the pertinent imaging from the last 24 hrs.    ECG/Telemetry  I have independently reviewed the ECG. No significant  findings.    ASSESSMENT AND PLAN           Hypertension   Assessment & Plan    Cont carvedilol     Abdominal pain   Assessment & Plan    Patient complaining of epigastric pain that radiates to the umbilicus and abdominal distension  Associated with coughing  Normal bowel movements  Exacerbated by coughing    Probably MSK from cough    - tylenol prn  - ct abd w contrast being performed by ED       Chronic kidney disease (CKD), stage III (moderate) (CMS/[facility]) ([facility])   Assessment & Plan    Baseline Cr 1.4-1.5, currently 1.2  2/2 AIN/DRESS from vanco    - monitor BMP  - cautious with IV contrast     COPD exacerbation (CMS/[facility]) ([facility])   Assessment & Plan    Patient with SOB/wheezing, increased O2 requirement with hx severe COPD  CXR with hyperinflation  Echo performed at OSH 4/5 with EF 55% grade I diastolic dysfunction  History of DRESS in response to vanco/augmnetin    - azithro x 5 days  - solumedrol 40q8h  - standing duonebs  - inhaled budesonide  - mucinex/tesslon  - CT chest pending  - pulm consult  - confirm home inhalers, unable to access med list          VTE Assessment: Padua    Code Status: Full Code  Estimated discharge date: 4/14/2019     ATTENDING DOCUMENTATION  ALSO SEE ATTENDING ATTESTATION SECTION OF NOTE

## 2019-04-11 NOTE — ASSESSMENT & PLAN NOTE
Patient complaining of epigastric pain that radiates to the umbilicus and abdominal distension  Associated with coughing  Normal bowel movements  Exacerbated by coughing  CTAP with contrast negative for acute abnormality    Likely MSK from cough    - tylenol prn

## 2019-04-11 NOTE — ASSESSMENT & PLAN NOTE
Severe COPD on 2L O2 at home; unclear trigger for exacerbation  - Improved air movement, less wheezing, still on 3L from baseline 2L    - Duonebs s6etyox   - Budesonide 0.5 neb BID   - azithro x 5 days (Start 4/11/19 End 4/15/19)  - Prednisone 40mg oral daily   - mucinex/tesslon  - pulm consulted, appreciate recs

## 2019-04-11 NOTE — ED PROVIDER NOTES
HPI     Chief Complaint   Patient presents with   • Shortness of Breath     Patient c/o sob that gets worse with exertion for 1 month.  Hx of copd         Shortness of Breath   Severity:  Moderate  Onset quality:  Gradual  Duration:  3 days  Timing:  Constant  Progression:  Unchanged  Chronicity:  Chronic  Context: activity    Relieved by:  Nothing  Worsened by:  Activity and exertion  Ineffective treatments:  Oxygen  Associated symptoms: cough (productive clear/yellow sputum) and wheezing    Associated symptoms: no abdominal pain, no chest pain, no fever, no headaches, no rash, no sore throat and no vomiting    Risk factors: obesity    Risk factors: no hx of PE/DVT         Patient History     No past medical history on file.    No past surgical history on file.    No family history on file.    Social History   Substance Use Topics   • Smoking status: Not on file   • Smokeless tobacco: Not on file   • Alcohol use Not on file       Systems Reviewed from Nursing Triage:          Review of Systems     Review of Systems   Constitutional: Negative for fatigue and fever.   HENT: Negative for sore throat and trouble swallowing.    Respiratory: Positive for cough (productive clear/yellow sputum), shortness of breath and wheezing.    Cardiovascular: Negative for chest pain and palpitations.   Gastrointestinal: Negative for abdominal pain and vomiting.   Genitourinary: Negative for dysuria and flank pain.   Musculoskeletal: Negative for arthralgias and back pain.   Skin: Negative for color change and rash.   Neurological: Negative for syncope and headaches.   All other systems reviewed and are negative.       Physical Exam     ED Triage Vitals [04/11/19 1417]   Temp Heart Rate Resp BP SpO2   36.9 °C (98.5 °F) (!) 105 20 (!) 188/99 94 %      Temp Source Heart Rate Source Patient Position BP Location FiO2 (%) (Set)   Oral Monitor Sitting Right upper arm --                     Patient Vitals for the past 24 hrs:   BP Temp Temp  src Pulse Resp SpO2   04/11/19 1711 (!) 163/118 - - (!) 104 (!) 22 99 %   04/11/19 1620 (!) 144/68 37 °C (98.6 °F) - (!) 106 (!) 24 96 %   04/11/19 1417 (!) 188/99 36.9 °C (98.5 °F) Oral (!) 105 20 94 %           Physical Exam   Constitutional: He appears well-developed and well-nourished.   HENT:   Head: Normocephalic and atraumatic.   Eyes: Conjunctivae are normal. No scleral icterus.   Neck: Neck supple. No JVD present.   Cardiovascular: Normal rate and regular rhythm.    Pulmonary/Chest: Effort normal and breath sounds normal. No respiratory distress.   Abdominal: Soft. There is no tenderness.   Musculoskeletal: He exhibits no edema or tenderness.   Neurological: He is alert. He has normal strength.   Skin: Skin is warm and dry.   Psychiatric: He has a normal mood and affect. His behavior is normal.   Nursing note and vitals reviewed.           Procedures    ED Course & MDM     Labs Reviewed   BASIC METABOLIC PANEL - Abnormal        Result Value    Sodium 138      Potassium 3.9      Chloride 103      CO2 24      BUN 21 (*)     Creatinine 1.2      Glucose 111 (*)     Calcium 8.8 (*)     eGFR 58.1 (*)     Anion Gap 11     CBC - Abnormal     WBC 10.97 (*)     RBC 4.48 (*)     Hemoglobin 14.5      Hematocrit 43.1      MCV 96.2      MCH 32.4      MCHC 33.6      RDW 13.5      Platelets 314      MPV 9.3 (*)    DIFF COUNT - Abnormal     Differential Type Auto      nRBC 0.0      Immature Granulocytes 0.6      Neutrophils 75.5      Lymphocytes 10.5      Monocytes 9.4      Eosinophils 3.6      Basophils 0.4      Immature Granulocytes, Absolute 0.07      Neutrophils, Absolute 8.28 (*)     Lymphocytes, Absolute 1.15 (*)     Monocytes, Absolute 1.03 (*)     Eosinophils, Absolute 0.40      Basophils, Absolute 0.04     TROPONIN I - Normal    Troponin I <0.03     B-TYPE NATRIURETIC PEPTIDE - Normal    BNP 74     CBC AND DIFFERENTIAL    Narrative:     The following orders were created for panel order CBC and  differential.  Procedure                               Abnormality         Status                     ---------                               -----------         ------                     CBC[[id number]]                           Abnormal            Final result               Diff Count[[id number]]                    Abnormal            Final result                 Please view results for these tests on the individual orders.       X-RAY CHEST 2 VIEWS   Final Result   IMPRESSION: Mild hyperinflation.  No dense effusion or dense consolidation.      ECG 12 lead                     [facility]         ED Course as of Apr 11 2126   Thu Apr 11, 2019   1638 D/w Dr Montano    Severe  COPD- Acute exx    Solu 40 q 8  Azithr  Duoneb  Home 02    Allergies to vanc/augment    Ad[place] to [facility]    COnsult to Charmaine  [MW]   1724 /82  [TRINO]   1730 83-year-old male history of HTN, COPD (home O2), CKD, BPH complains of dyspnea for several weeks.  Was in [place] last month.  Has chronic lower extremity edema.  Has cough productive of white and yellow sputum.  No fever.  Saw Dr. Montano in the office today who recommended admission for COPD flare.  Dr. Alvarez spoke to Narcisa and recorded her recommendations.  Patient has severely decreased breath sounds bilaterally  Labs stable and chest x-ray shows no acute disease  Will admit for COPD flare  [TRINO]   1746 Remains tachycardic--will check CT chest to r/o PE  [TRINO]   1752 Case d/w Fer who will admit  [TRINO]      ED Course User Index  [TRINO] Connie Graves MD  [MW] Larry Alvarez MD         Clinical Impressions as of Apr 11 2126   COPD exacerbation (CMS/[facility]) ([facility])        Connie Graves MD  04/11/19 2126

## 2019-04-11 NOTE — CONSULTS
Pulmonology Consult Note    Subjective     Zuhair Luong Jr is a 83 y.o. male who is well known to me from my outpt office with a h/o severe COPD with recurrent exacerbations over the last few years, most recently with exacerbations due to URIs in 2/2019 and 3/2019 while in [place], which were treated with abx (levaquin in 2/2019, azithro in 3/2019) and prednisone. He returned from [place] via train yesterday, and came to my office today c/o severe dyspnea with minimal exertion and difficulty getting enough air into his lungs. He stated that his breathing has never felt this bad. He notes that over the last few months, since a COPD exacerbation due to URI in Feb, he has continued to decline with worsening [place].  No cough  No sputum  No fevers, chills, sweats  He has home O2 which he has recently been using 24/7  Also has home nebs: budesonide/albuterol BID    He has had increased abdominal distention and abd discomfort over the last few weeks. Normal BMs without diarrhea or constipation.    Of note, he has a h/o severe DRESS syndrome due to Vancomycin, and possibly also due to Augmentin.      PMH:  DRESS syndrome 9/2016 due to IV vanco with renal failure and hepatitis, recurred 3/2017 after po vanco  9/2016 - MRSA pneumonia  1/2017 - MRSA pneumonia Rxed with linezolid  3/2017 - urosepsis with ecoli bacteremia  3/2017 - pseudomonas pneumonia  3/2017 - Cdiff colitis    Chronic sinusitis  Prostate CA 2007 s/p RT, foll at [facility]  Severe COPD    Social History:   Former smoker 2ppd x 40 yrs, quit 1993   >50 yrs  , retired    FH:  Mother and Sister - melanoma    Allergies: Vancomycin and Possibly DRESS from Augmentin        Review of Systems:  General - no anorexia, no weight loss, no fevers; +FATIGUE  ENT - no sore throat, no difficulty swallowing  Endocrine - no cold intolerance, no heat intolerance  Hematology - no easy bruising, no bleeding  Respiratory - no hemoptysis, no chest pain  Cardiovascular  - no dizziness, no palpitations  Gastrointestinal - + abd pain, no N/V/D  Muskuloskeletal - no myalgias, + arthralgias  Skin - no rashes, no lesions  Neuro - no numbness, no tingling        Objective     Vital Signs for the last 24 hours:  Temp:  [36.9 °C (98.5 °F)-37 °C (98.6 °F)] 37 °C (98.6 °F)  Heart Rate:  [104-106] 104  Resp:  [20-24] 22  BP: (144-188)/() 163/118  SpO2:  [94 %-99 %] 99 %    Oxygen:  Oxygen Therapy: Supplemental oxygen  O2 Delivery Method: Nasal cannula     O2 Flow Rate (L/min): 2 L/min     Physical Exam:    General - weak appearing, in mild resp distress  HEENT - OP clear without exudates or lesions  Neck - no lymphadenopathy or masses  Lungs - CTA bilaterally without wheezing, rales or rhonchi  Heart - RRR; no murmurs  Abd - soft, + distended and slightly tender in LUQ  Ext - no cyanosis, clubbing; 1+ pitting bilateral LE edema (NEW for him)  Skin - no rashes or lesions  Neuro - alert and oriented x 3; normal affect    Labs:  Labs reviewed : Cr 1.2, wbc 10.9, Trop/BNP neg    Imaging:    CXR personally reviewed - no new infiltrate        IMP:  Acute COPD Exacerbation in setting of recent URIs x2 and recent trip to [place] and h/o recurrent COPD exacerbations over the last few yrs    H/o DRESS syndrome from vanco and possibly augmentin  New onset LE edema and ongoing fatigue  New onset abd discomfort    REC:  - would start solumedrol 40mg q8  - duonebs q4 standing  - azithro x 5 day course  - check echo  - work-up of abdominal distention and discomfort per primary team (he does have a h/o CKD, and today's Cr is better than his baseline - would be cautious with use of IV contrast; pt well known to Dr Sharma)    Reva Montano MD

## 2019-04-12 LAB
ANION GAP SERPL CALC-SCNC: 8 MEQ/L (ref 3–15)
ATRIAL RATE: 97
BUN SERPL-MCNC: 21 MG/DL (ref 8–20)
CALCIUM SERPL-MCNC: 8.7 MG/DL (ref 8.9–10.3)
CHLORIDE SERPL-SCNC: 101 MEQ/L (ref 98–109)
CO2 SERPL-SCNC: 28 MEQ/L (ref 22–32)
CREAT SERPL-MCNC: 1.1 MG/DL
ERYTHROCYTE [DISTWIDTH] IN BLOOD BY AUTOMATED COUNT: 13.5 % (ref 11.6–14.4)
GFR SERPL CREATININE-BSD FRML MDRD: >60 ML/MIN/1.73M*2
GLUCOSE SERPL-MCNC: 192 MG/DL (ref 70–99)
HCT VFR BLDCO AUTO: 40.3 %
HGB BLD-MCNC: 13.3 G/DL
MCH RBC QN AUTO: 31.6 PG (ref 28–33.2)
MCHC RBC AUTO-ENTMCNC: 33 G/DL (ref 32.2–36.5)
MCV RBC AUTO: 95.7 FL (ref 83–98)
P AXIS: 90
PDW BLD AUTO: 9.8 FL (ref 9.4–12.4)
PLATELET # BLD AUTO: 307 K/UL
POTASSIUM SERPL-SCNC: 4.3 MEQ/L (ref 3.6–5.1)
PR INTERVAL: 146
QRS DURATION: 72
QT INTERVAL: 358
QTC CALCULATION(BAZETT): 470
R AXIS: 1
RBC # BLD AUTO: 4.21 M/UL (ref 4.5–5.8)
SODIUM SERPL-SCNC: 137 MEQ/L (ref 136–144)
T WAVE AXIS: 72
VENTRICULAR RATE: 104
WBC # BLD AUTO: 9.85 K/UL

## 2019-04-12 PROCEDURE — 94664 DEMO&/EVAL PT USE INHALER: CPT

## 2019-04-12 PROCEDURE — 63600000 HC DRUGS/DETAIL CODE: Performed by: STUDENT IN AN ORGANIZED HEALTH CARE EDUCATION/TRAINING PROGRAM

## 2019-04-12 PROCEDURE — 80048 BASIC METABOLIC PNL TOTAL CA: CPT | Performed by: STUDENT IN AN ORGANIZED HEALTH CARE EDUCATION/TRAINING PROGRAM

## 2019-04-12 PROCEDURE — 36415 COLL VENOUS BLD VENIPUNCTURE: CPT | Performed by: STUDENT IN AN ORGANIZED HEALTH CARE EDUCATION/TRAINING PROGRAM

## 2019-04-12 PROCEDURE — 97161 PT EVAL LOW COMPLEX 20 MIN: CPT | Mod: GP

## 2019-04-12 PROCEDURE — 63700000 HC SELF-ADMINISTRABLE DRUG: Performed by: STUDENT IN AN ORGANIZED HEALTH CARE EDUCATION/TRAINING PROGRAM

## 2019-04-12 PROCEDURE — 94667 MNPJ CHEST WALL 1ST: CPT

## 2019-04-12 PROCEDURE — 93010 ELECTROCARDIOGRAM REPORT: CPT | Performed by: INTERNAL MEDICINE

## 2019-04-12 PROCEDURE — 85027 COMPLETE CBC AUTOMATED: CPT | Performed by: STUDENT IN AN ORGANIZED HEALTH CARE EDUCATION/TRAINING PROGRAM

## 2019-04-12 PROCEDURE — 99233 SBSQ HOSP IP/OBS HIGH 50: CPT | Performed by: INTERNAL MEDICINE

## 2019-04-12 PROCEDURE — 21400000 HC ROOM AND CARE CCU/INTERMEDIATE

## 2019-04-12 PROCEDURE — 25000000 HC PHARMACY GENERAL: Performed by: STUDENT IN AN ORGANIZED HEALTH CARE EDUCATION/TRAINING PROGRAM

## 2019-04-12 PROCEDURE — 95806 SLEEP STUDY UNATT&RESP EFFT: CPT

## 2019-04-12 RX ORDER — CARVEDILOL 6.25 MG/1
6.25 TABLET ORAL 2 TIMES DAILY WITH MEALS
Status: DISCONTINUED | OUTPATIENT
Start: 2019-04-12 | End: 2019-04-14

## 2019-04-12 RX ADMIN — GUAIFENESIN 200 MG: 100 SOLUTION ORAL at 21:51

## 2019-04-12 RX ADMIN — FAMOTIDINE 20 MG: 20 TABLET, FILM COATED ORAL at 08:11

## 2019-04-12 RX ADMIN — AZITHROMYCIN DIHYDRATE 500 MG: 500 INJECTION, POWDER, LYOPHILIZED, FOR SOLUTION INTRAVENOUS at 17:39

## 2019-04-12 RX ADMIN — IPRATROPIUM BROMIDE AND ALBUTEROL SULFATE 3 ML: 2.5; .5 SOLUTION RESPIRATORY (INHALATION) at 10:16

## 2019-04-12 RX ADMIN — ATORVASTATIN CALCIUM 10 MG: 10 TABLET, FILM COATED ORAL at 08:11

## 2019-04-12 RX ADMIN — METHYLPREDNISOLONE SODIUM SUCCINATE 40 MG: 40 INJECTION, POWDER, FOR SOLUTION INTRAMUSCULAR; INTRAVENOUS at 17:35

## 2019-04-12 RX ADMIN — FINASTERIDE 5 MG: 5 TABLET, FILM COATED ORAL at 08:12

## 2019-04-12 RX ADMIN — IPRATROPIUM BROMIDE AND ALBUTEROL SULFATE 3 ML: 2.5; .5 SOLUTION RESPIRATORY (INHALATION) at 14:06

## 2019-04-12 RX ADMIN — TAMSULOSIN HYDROCHLORIDE 0.4 MG: 0.4 CAPSULE ORAL at 08:11

## 2019-04-12 RX ADMIN — IPRATROPIUM BROMIDE AND ALBUTEROL SULFATE 3 ML: 2.5; .5 SOLUTION RESPIRATORY (INHALATION) at 21:49

## 2019-04-12 RX ADMIN — METHYLPREDNISOLONE SODIUM SUCCINATE 40 MG: 40 INJECTION, POWDER, FOR SOLUTION INTRAMUSCULAR; INTRAVENOUS at 05:45

## 2019-04-12 RX ADMIN — BUDESONIDE 0.5 MG: 0.5 INHALANT ORAL at 17:35

## 2019-04-12 RX ADMIN — BUDESONIDE 0.5 MG: 0.5 INHALANT ORAL at 05:42

## 2019-04-12 RX ADMIN — IPRATROPIUM BROMIDE AND ALBUTEROL SULFATE 3 ML: 2.5; .5 SOLUTION RESPIRATORY (INHALATION) at 17:36

## 2019-04-12 RX ADMIN — HEPARIN SODIUM 5000 UNITS: 5000 INJECTION INTRAVENOUS; SUBCUTANEOUS at 21:49

## 2019-04-12 RX ADMIN — IPRATROPIUM BROMIDE AND ALBUTEROL SULFATE 3 ML: 2.5; .5 SOLUTION RESPIRATORY (INHALATION) at 05:42

## 2019-04-12 RX ADMIN — CARVEDILOL 6.25 MG: 3.12 TABLET, FILM COATED ORAL at 08:11

## 2019-04-12 RX ADMIN — PANTOPRAZOLE SODIUM 20 MG: 20 TABLET, DELAYED RELEASE ORAL at 08:11

## 2019-04-12 RX ADMIN — HEPARIN SODIUM 5000 UNITS: 5000 INJECTION INTRAVENOUS; SUBCUTANEOUS at 15:26

## 2019-04-12 RX ADMIN — CARVEDILOL 6.25 MG: 3.12 TABLET, FILM COATED ORAL at 17:35

## 2019-04-12 ASSESSMENT — COPD QUESTIONNAIRES
EXERTION INCLINE: 5 - WHEN I WALK UP A HILL OR ONE FLIGHT OF STAIRS I AM VERY BREATHLESS
CAT_TOTALSCORE: 16
QUESTION2_PHLEGM: 3
QUESTION7_SLEEPQUALITY: 3
QUESTION3_CHESTTIGHTNESS: 0 - MY CHEST DOES NOT FEEL TIGHT AT ALL
QUESTION6_LEAVINGHOUSE: 0 - I AM CONFIDENT LEAVING MY HOME DESPITE MY LUNG CONDITION
QUESTION5_HOMEACTIVITIES: 3
QUESTION8_ENERGYLEVEL: 2
QUESTION1_COUGHFREQUENCY: 0 - I NEVER COUGH

## 2019-04-12 ASSESSMENT — COGNITIVE AND FUNCTIONAL STATUS - GENERAL
WALKING IN HOSPITAL ROOM: 4 - NONE
STANDING UP FROM CHAIR USING ARMS: 4 - NONE
MOVING TO AND FROM BED TO CHAIR: 4 - NONE
CLIMB 3 TO 5 STEPS WITH RAILING: 3 - A LITTLE

## 2019-04-12 NOTE — NURSING NOTE
COPD edu. Done.  Pt given flutter and did well with device. STOP-BANG assessment done with pt. Pt getting bedside sleep-study tonight.

## 2019-04-12 NOTE — PROGRESS NOTES
Patient: Zuhair Luong Jr  Location: [place] [address]  MRN: [id number]  Today's date: 4/12/2019    Hospital Course  No notes on file  Concluded session with pt sitting in bed side chair with RN remote in hand.          Therapy Pain/Vitals     Row Name 04/12/19 1013 04/12/19 1014       Pain/Comfort/Sleep    Pain Charting Type Pain Assessment  --    Presence of Pain denies  --    Preferred Pain Scale number (Numeric Rating Pain Scale)  --    Pain Rating (0-10): Rest 0  --    Pain Rating (0-10): Activity 0  --       Vital Signs    Pulse (!)  106 (!)  111    SpO2 97 % 95 %    Patient Activity At rest  --    Oxygen Therapy Supplemental oxygen  --    O2 Delivery Method Nasal cannula  --    O2 Flow Rate (L/min) 2 L/min  --    BP (!)  173/81  --          Prior Living Environment      Most Recent Value   Lives With  spouse   Living Arrangements  house   Number of Stairs, Main Entrance  one   Stair Railings, Main Entrance  none   Equipment Currently Used at Home  oxygen   Stairs, Within Home, Primary  13   Stair Railings, Within Home, Primary  railings safe and in good condition          Prior Level of Function      Most Recent Value   Ambulation  independent   Transferring  independent   Toileting  independent   Bathing  independent   Dressing  independent   Eating  independent   Communication  understands/communicates without difficulty   Swallowing  swallows foods/liquids without difficulty   Equipment Currently Used at Home  oxygen                PT Evaluation - 04/12/19 1007        Session Details    Document Type initial evaluation    Mode of Treatment individual therapy;physical therapy       Time Calculation    Start Time 0947    Stop Time 1002    Time Calculation (min) 15 min       General Information    Patient Profile Reviewed? yes    Onset of Illness/Injury or Date of Surgery 04/11/19    Referring Physician costa    Pertinent History of Current Functional Problem SOB    Existing  Precautions/Restrictions oxygen therapy device and L/min       Sensory    Sensory General Assessment no sensation deficits identified       Range of Motion (ROM)    General Range of Motion no range of motion deficits identified       Manual Muscle Testing (MMT)    General MMT Assessment no strength deficits identified       Bed Mobility    Romeo, Supine to Sit independent       Sit to Stand Transfer    Romeo, Sit to Stand Transfer independent       Stand to Sit Transfer    Romeo, Stand to Sit Transfer independent       Gait Training    Romeo, Gait independent    Assistive Device --   none    Distance in Feet 250 feet    Gait Pattern Utilized step-through    Gait Deviations Identified decreased den       Stairs Training    Romeo, Stairs not tested       AM-PAC (TM) - Mobility (Current Function)    Turning from your back to your side while in a flat bed without using bedrails? 4 - None    Moving from lying on your back to sitting on the side of a flat bed without using bedrails? 4 - None    Moving to and from a bed to a chair? 4 - None    Standing up from a chair using your arms? 4 - None    To walk in a hospital room? 4 - None    Climbing 3-5 steps with a railing? 3 - A Little    AM-PAC (TM) Mobility Score 23       PT Clinical Impression    Patient's Goals For Discharge return home    Plan For Care Reviewed: Physical Therapy patient voices agreement with PT plan for care    Impairments Found (PT Eval) aerobic capacity/endurance    Rehab Potential/Prognosis good, to achieve stated therapy goals    Problem List decreased strength    Anticipated Equipment Needs at Discharge none    Daily Outcome Statement Pt managing all mobility tasks s PT A.  O2 sats staying wnl's with activity.  D/C PT no needs.           Pain Assessment/Intervention  Pain Charting Type: Pain Assessment             Education provided this session. See the Patient Education summary report for full details.

## 2019-04-12 NOTE — ASSESSMENT & PLAN NOTE
Chronic HTN   - Hypertensive this AM; may be related to steroids     - Increased Carvedilol 12.5 mg oral BID; can uptitrate as well  - Start amlodipine 5 mg oral daily

## 2019-04-12 NOTE — PROGRESS NOTES
Internal Medicine  Daily Progress Note       SUBJECTIVE   This is a 83 y.o. year-old male admitted on 2019 with COPD exacerbation (CMS/[facility]) (HCC) [J44.1].    Interval History: CALVIN overnight. Reports breathing, cough improving from yesterday on admission. Will continue steroids, nebulizers, azithro. Patient expressed understanding of plan.     OBJECTIVE      Vital signs in last 24 hours:  Temp:  [36.4 °C (97.5 °F)-37 °C (98.6 °F)] 36.4 °C (97.5 °F)  Heart Rate:  [101-120] 120  Resp:  [20-24] 20  BP: (144-188)/() 173/81  Temp (24hrs), Av.8 °C (98.2 °F), Min:36.4 °C (97.5 °F), Max:37 °C (98.6 °F)    Intake/Output     None          PHYSICAL EXAMINATION      Physical Exam   Constitutional: He is oriented to person, place, and time. He appears well-developed and well-nourished. No distress.   HENT:   Head: Normocephalic and atraumatic.   Eyes: Pupils are equal, round, and reactive to light. EOM are normal.   Neck: Normal range of motion. Neck supple. No JVD present.   Cardiovascular: Normal rate and regular rhythm.  Exam reveals no gallop and no friction rub.    No murmur heard.  Pulmonary/Chest: Effort normal. No respiratory distress. He has no wheezes. He has no rales.   Moderately diminished breath sounds  Mild wheezing bilaterally   Abdominal: Soft. Bowel sounds are normal. He exhibits no distension and no mass. There is no tenderness. There is no rebound and no guarding.   Musculoskeletal: Normal range of motion.   Neurological: He is alert and oriented to person, place, and time.   Skin: Skin is warm and dry. He is not diaphoretic.   Psychiatric: He has a normal mood and affect.      LINES, CATHETERS, DRAINS, AIRWAYS, AND WOUNDS   Lines, Drains, Airways, Wounds:  Peripheral IV 19 Left Wrist (Active)   Number of days: 1       Comments:      LABS / IMAGING / TELE      Labs  Lab Results   Component Value Date    GLUCOSE 192 (H) 2019    CALCIUM 8.7 (L) 2019     2019    K  4.3 04/12/2019    CO2 28 04/12/2019     04/12/2019    BUN 21 (H) 04/12/2019    CREATININE 1.1 04/12/2019     Lab Results   Component Value Date    WBC 9.85 04/12/2019    HGB 13.3 (L) 04/12/2019    HCT 40.3 04/12/2019    MCV 95.7 04/12/2019     04/12/2019     Imaging  I have independently reviewed the pertinent imaging from the last 24 hrs.    ECG/Telemetry  I have independently reviewed the telemetry. No events for the last 24 hours.    ASSESSMENT AND PLAN      * COPD exacerbation (CMS/[facility]) ([facility])   Assessment & Plan    Patient with SOB/wheezing, increased O2 requirement with hx severe COPD  CXR with hyperinflation  Echo performed at OSH 4/5 with EF 55% grade I diastolic dysfunction  History of DRESS in response to vanco/augmnetin  CTA Chest negative for acute abnormality    - azithro x 5 days (Start 4/11/19 End 4/15/19)  - solumedrol 40q8h  - standing duonebs  - inhaled budesonide q12h  - mucinex/tesslon  - pulm consulted, appreciate recs  - confirm home inhalers, unable to access med list     Hypertension   Assessment & Plan    Cont carvedilol     Abdominal pain   Assessment & Plan    Patient complaining of epigastric pain that radiates to the umbilicus and abdominal distension  Associated with coughing  Normal bowel movements  Exacerbated by coughing  CTAP with contrast negative for acute abnormality    Likely MSK from cough    - tylenol prn       Chronic kidney disease (CKD), stage III (moderate) (CMS/[facility]) ([facility])   Assessment & Plan    Baseline Cr 1.4-1.5, currently 1.2  2/2 AIN/DRESS from vanco    - monitor BMP  - cautious with IV contrast          VTE Assessment: Padua    Code Status: Full Code  Estimated discharge date: 4/14/2019     ATTENDING DOCUMENTATION  ALSO SEE ATTENDING ATTESTATION SECTION OF NOTE

## 2019-04-12 NOTE — PLAN OF CARE
Problem: Chronic Obstructive Pulmonary Disease (Adult)  Goal: Signs and Symptoms of Listed Potential Problems Will be Absent, Minimized or Managed (Chronic Obstructive Pulmonary Disease)  Outcome: Ongoing (interventions implemented as appropriate)   04/12/19 0106   Chronic Obstructive Pulmonary Disease   Problems Assessed (Chronic Obstructive Pulmonary Disease (COPD)) all   Problems Present (Chronic Obstructive Pulmonary Disease (COPD)) respiratory compromise

## 2019-04-12 NOTE — PLAN OF CARE
Problem: Patient Care Overview  Goal: Discharge Needs Assessment  Outcome: Ongoing (interventions implemented as appropriate)  Discharge plan discussed during CPR. Patient is tentative for discharge tomorrow vs Sunday.  Patient asking about a portable concentrator, states that his pulm physician will write a script for one. No other dispo needs identified. Wife will  at discharge.  Patient is agreeable to the dispo plan. Will continue to follow for transition of care needs until discharge is completed.       04/12/19 1422   DC Needs Assessment   Concerns To Be Addressed no discharge needs identified;denies needs/concerns at this time   Readmission Within The Last 30 Days no previous admission in last 30 days   Provider Choice List(s) Given no   Anticipated Discharge Disposition home without services   Community Agency Name(s) Has Home O2 with [employer]/ on 2LNC   Equipment Needed After Discharge other (see comments)  (Requesting portable concentrator)   Current Discharge Risk chronically ill   Current Health   Anticipated Changes Related to Illness none   Activity/Self Care ROS   Equipment Currently Used at Home inhaler;nebulizer;oxygen

## 2019-04-12 NOTE — PATIENT CARE CONFERENCE
Care Progression Rounds Note  Date: 4/12/2019  Time: 12:38 PM     Patient Name: Zuhair Luong Jr     Medical Record Number: [id number]   YOB: 1935  Sex: Male      Room/Bed: 0153    Admitting Diagnosis: COPD exacerbation (CMS/HCC) (HCC) [J44.1]   Admit Date/Time: 4/11/2019  4:59 PM    Primary Diagnosis: COPD exacerbation (CMS/HCC) (HCC)  Principal Problem: COPD exacerbation (CMS/HCC) (HCC)    GMLOS: pending  Anticipated Discharge Date: 4/14/2019    AM-PAC  Mobility Score: 23    Discharge Planning:  Living Arrangements: house  Concerns To Be Addressed: no discharge needs identified  Anticipated Discharge Disposition: home without services    Barriers to Discharge:  Barriers to Discharge: Medical issues not resolved, Test pending    Participants:  physical therapy, nursing, , physician, social work/services

## 2019-04-12 NOTE — STUDENT
Medical Student Note: For educational purposes only.  Do not use for coding or billing.     Medical Student History & Physical    Chief Complaint:   Chief Complaint   Patient presents with   • Shortness of Breath     Patient c/o sob that gets worse with exertion for 1 month.  Hx of copd   .    HPI     Patient is a 83 y.o. 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     He has a history of recurrent pneumoniae infections. 2 from MRSA in 2016 and 2017 and 1 from pseudomonas in 2017.     The shortness of breath has been gradually increasing over the last 3 weeks. The pt was in [place] on vacation and was unable to move around without getting short of breath. He has had to increase in home O2 requirements. He returned from [place] yesterday and noted a worsening of his SOB so he came into the ED. Pt has also had a cough with clear sputum over the last few weeks. Pt also has increased swelling in his LE from baseline and epigastric pain when coughing but not at rest.     In the ED he received duonebs, azithromycin and solumedrol which improved his symptoms.     This morning, pt feels much better than when he first came into the ED. He has improved breathing. No conversational dyspnea. Denies chills, CP, N/V/D and green or bloody mucous.     Medical History:   Past Medical History:   Diagnosis Date   • COPD (chronic obstructive pulmonary disease) (CMS/HCC) ([facility])    • Emphysema lung (CMS/[facility]) ([facility])    • Hypertension        Surgical History:   Past Surgical History:   Procedure Laterality Date   • COLON SURGERY     • NASAL SEPTUM SURGERY         Social History:   Social History     Social History   • Marital status:      Spouse name: N/A   • Number of children: N/A   • Years of education: N/A     Social History Main Topics   • Smoking status: Former Smoker   • Smokeless tobacco: Never Used   • Alcohol use No      Comment:  stopped in 1987   • Drug use: No   • Sexual activity: Defer     Other Topics Concern   • None     Social History Narrative   • None       Family History: History reviewed. No pertinent family history.    Allergies: Vancomycin    Home Medications:  •  atorvastatin, Take 10 mg by mouth daily.  •  carvedilol, Take 3.125 mg by mouth 2 times daily.  •  famotidine, Take 40 mg by mouth 2 times daily.  •  finasteride, Take 5 mg by mouth daily.  •  montelukast, Take by mouth nightly.  •  omeprazole, Take 20 mg by mouth daily before breakfast.  •  tamsulosin, Take 0.4 mg by mouth 2 times daily.  •  tiotropium, Place 18 mcg into inhaler and inhale daily.    Current Medications:  •  atorvastatin, 10 mg, oral, Daily  •  azithromycin, 500 mg, intravenous, q24h INT  •  benzonatate, 100 mg, oral, 3x daily PRN  •  budesonide, 0.5 mg, nebulization, BID (6a, 6p)  •  carvedilol, 6.25 mg, oral, BID with meals  •  glucose, 16-32 g of dextrose, oral, PRN **OR** dextrose, 15-30 g of dextrose, oral, PRN **OR** glucagon, 1 mg, intramuscular, PRN **OR** dextrose in water, 25 mL, intravenous, PRN  •  famotidine, 20 mg, oral, Daily  •  finasteride, 5 mg, oral, Daily  •  guaiFENesin, 200 mg, oral, q6h PRN  •  heparin (porcine), 5,000 Units, subcutaneous, q8h ASAEL  •  ipratropium-albuterol, 3 mL, nebulization, q4h ASAEL  •  methylPREDNISolone sodium succinate, 40 mg, intravenous, q12h INT  •  pantoprazole, 20 mg, oral, Daily  •  tamsulosin, 0.4 mg, oral, Daily      Objective     Vital Signs for the last 24 hours:  Temp:  [36.4 °C (97.5 °F)-37 °C (98.6 °F)] 36.4 °C (97.5 °F)  Heart Rate:  [] 111  Resp:  [20-24] 20  BP: (144-188)/() 173/81    AAOx3, NAD, sitting comfortably  RRR, +S1 +S2, no m/r/g  Clear b/l, diminished air movement, no wheezing  +BS, soft, NT/ND  +1 pitting edema in LE b/l    Labs:  I have reviewed the patient's labs.  Current labs are within normal limits.    Imaging:  CXR - mild hyperinflation, no effusion, no  consolidations  CT - no evidence of PE, opacity in the lateral RUL consistent with 12/2018 study, abdomen and pelvis unremarkable    ECG:  sinus tachycardia      Assessment/Plan     Code Status: Full Code    *COPD Exacerbation  - presented with SOB, wheezing, increased O2 requirements, and hyperinflation of CXR  - start duonebs q4, budesonide q12, solumedrol 40mg q8, azithromycin 500 IV for 5 day (start 4/12), methylprenisolone 40 IV q12, mucinex  - maintain O2 sat >90%  - increase O2 as needed, currently stable of home dose 2L NC  - apnea link test     Abdominal Pain  - likely MSK related from chronic cough  - normal CT abdomen and pelvis  - Tylenol PRN for pain relief    CKD  - secondary to DRESS syndrome in 2015  - baseline Cr ~1.4  - Cr this AM 1.1  - stable, trend on morning BMP

## 2019-04-12 NOTE — PROGRESS NOTES
"   Pulmonary Progress Note       SUBJECTIVE   Patient feels much better today than yesterday. He was able to go to the bathroom without wearing his oxygen. He was able to sleep some, but it was interrupted. He said this is typical for him recently.     OBJECTIVE        Vital Signs:   BP (!) 173/81   Pulse (!) 111   Temp 36.4 °C (97.5 °F) (Oral)   Resp 20   Ht 1.651 m (5' 5\")   Wt 79.4 kg (175 lb)   SpO2 95%   BMI 29.12 kg/m²     I/O's:  No intake or output data in the 24 hours ending 04/12/19 1153    Medications:    Reviewed. Pertinent medications as below.      atorvastatin 10 mg oral Daily   azithromycin 500 mg intravenous q24h INT   budesonide 0.5 mg nebulization BID (6a, 6p)   carvedilol 6.25 mg oral BID with meals   famotidine 20 mg oral Daily   finasteride 5 mg oral Daily   heparin (porcine) 5,000 Units subcutaneous q8h ASAEL   ipratropium-albuterol 3 mL nebulization q4h ASAEL   methylPREDNISolone sodium succinate 40 mg intravenous q12h INT   pantoprazole 20 mg oral Daily   tamsulosin 0.4 mg oral Daily       Anti-infectives     Start     Dose/Rate Route Frequency Ordered Stop    04/12/19 1800  azithromycin (ZITHROMAX) IVPB 500 mg in 250 mL D5W vial in bag      500 mg  250 mL/hr over 60 Minutes intravenous Every 24 hours interval 04/11/19 1917 04/17/19 1619          PHYSICAL EXAMINATION        General: The patient is in no acute distress.  Resting comfortably in bed.  HEENT: Mucous membranes are moist.  Sclera are anicteric.  Neck: Supple.  No cervical lymphadenopathy  Cardiovascular: Blowing grade II/VI systolic murmur heard best in pulmonic area. S1 and S2 are present.  Lungs: Clear to auscultation bilaterally without wheezes or rhonchi  Abdomen: Mildly distended but soft and nontender.  Extremities: Cool and dry without edema  Neuro: Awake and alert.  Spontaneously moving all extremities       Diagnostic Data      Labs:    I have personally reviewed all pertinent patient laboratory results. Labs of note " discussed below:    ABG Results     No lab values to display.        CMP Results       04/12/19 04/11/19 12/17/18                    0642 1437 0948          138 140         K 4.3 3.9 4.5         Cl 101 103 105         CO2 28 24 27         Glucose 192 (H) 111 (H) 100 (H)         BUN 21 (H) 21 (H) 29 (H)         Creatinine 1.1 1.2 1.5 (H)         Calcium 8.7 (L) 8.8 (L) 8.9         Anion Gap 8 11 8         EGFR &gt;60.0 58.1 (L) 44.9 (L)         Comment for K at 1437 on 04/11/19:    Results obtained on plasma. Plasma Potassium values may be up to 0.4 mEQ/L less than serum values. The differences may be greater for patients with high platelet or white cell counts.        CBC Results       04/12/19 04/11/19 11/13/18                    0639 1437 1448         WBC 9.85 10.97 (H) 12.14 (H)         RBC 4.21 (L) 4.48 (L) 4.65         HGB 13.3 (L) 14.5 14.6         HCT 40.3 43.1 42.9         MCV 95.7 96.2 92.3         MCH 31.6 32.4 31.4         MCHC 33.0 33.6 34.0          314 283                     Imaging:    I have reviewed all pertinent imaging which is discussed below.    N/A      ASSESSMENT AND PLAN      AECOPD. Patient seems to have improved significantly since yesterday. He has frequent exacerbations. His home regimen is budesonide BID, Albuterol TID, and Spiriva. He wears oxygen.    Concern for ANNIE. Patient seems to be having restless sleep and wife reports he talks in his sleep. He may benefit from an Apnea Link.    Recommendations:  -Apnea link test (we have placed the order)  -Continue budesonide nebs BID  -Continue DuoNebs Q4h  -Continue methylprednisolone 40 mg IV Q12h  -Continue azithromycin 500 mg D2/5. OK to switch to PO whenever felt appropriate by primary team.  -Start albuterol nebs Q4h PRN  -On discharge, plan for the following steroid taper: Prednisone 40 mg PO QD x2 days, then 30 mg x2 days, then 20 mg x2 days, then 10 mg QD x2 days, then 5 mg QD x2 days, then stop  -Follow up with   Charmaine in the office within 2 weeks of discharge with Apnea Link results       Justin Marsh DO  4/12/2019  PGY-2

## 2019-04-12 NOTE — NURSING NOTE
Monitor hr 119 st. Pt resting at bedside. Pt finished meal. Pt assessed. SPO2 96% 2L NC. Lungs diminished. +Pulses Trace LE edema. Shift med admin. Pt offers no complaints. Pt denies pain. Call bell within reach. Bed in lowest position. Bed alarm on. Will continue to monitor.

## 2019-04-13 ENCOUNTER — APPOINTMENT (INPATIENT)
Dept: CARDIOLOGY | Facility: HOSPITAL | Age: 84
DRG: 191 | End: 2019-04-13
Attending: STUDENT IN AN ORGANIZED HEALTH CARE EDUCATION/TRAINING PROGRAM
Payer: COMMERCIAL

## 2019-04-13 LAB
AORTIC ROOT ANNULUS: 2.3 CM
AV PEAK GRADIENT: 10 MMHG
AV PEAK VELOCITY-S: 1.62 M/S
AV VALVE AREA: 2.46 CM2
BSA FOR ECHO PROCEDURE: 1.91 M2
DOP CALC LVOT STROKE VOLUME: 72.57 ML
E WAVE DECELERATION TIME: 182 MS
E/A RATIO: 0.6
E/E' RATIO: 12.2
E/LAT E' RATIO: 15.6
EDV (BP): 57.1 CM3
EF (A4C): 80.7 %
EF A2C: 69.4 %
ESV (BP): 14.3 CM3
LA ESV INDEX (A2C): 12.67 CM3/M2
LA/AORTA RATIO: 1.13
LAAS-AP2: 11.3 CM2
LAAS-AP4: 14.5 CM2
LAD 2D: 2.6 CM
LALD A4C: 4.3 CM
LALD A4C: 5.84 CM
LAV-S: 24.2 CM3
LEFT ATRIUM VOLUME INDEX: 12.93 CM3/M2
LEFT ATRIUM VOLUME: 24.7 CM3
LEFT VENTRICLE DIASTOLIC VOLUME INDEX: 32.04 CM3/M2
LEFT VENTRICLE DIASTOLIC VOLUME: 61.2 CM3
LEFT VENTRICLE SYSTOLIC VOLUME INDEX: 6.18 CM3/M2
LEFT VENTRICLE SYSTOLIC VOLUME: 11.8 CM3
LV DIASTOLIC VOLUME: 51.6 CM3
LV ESV (APICAL 2 CHAMBER): 15.8 CM3
LVAD-AP2: 21.4 CM2
LVAD-AP4: 22.9 CM2
LVAS-AP2: 10.5 CM2
LVAS-AP4: 8.76 CM2
LVEDVI(A2C): 27.02 CM3/M2
LVEDVI(BP): 29.9 CM3/M2
LVESVI(A2C): 8.27 CM3/M2
LVESVI(BP): 7.49 CM3/M2
LVLD-AP2: 7.42 CM
LVLD-AP4: 7.17 CM
LVLS-AP2: 6.24 CM
LVLS-AP4: 5.65 CM
LVOT 2D: 2.2 CM
LVOT A: 3.8 CM2
LVOT MG: 2 MMHG
LVOT MV: 0.68 M/S
LVOT PEAK VELOCITY: 1.05 M/S
LVOT VTI: 19.1 CM
MV E'TISSUE VEL-LAT: 0.06 M/S
MV E'TISSUE VEL-MED: 0.08 M/S
MV PEAK A VEL: 1.43 M/S
MV PEAK E VEL: 0.92 M/S
SEPTAL TISSUE DOPPLER FREE WALL LATE DIA VELOCITY (APICAL 4 CHAMBER VIEW): 0.14 M/S
TAPSE: 2.45 CM

## 2019-04-13 PROCEDURE — 21400000 HC ROOM AND CARE CCU/INTERMEDIATE

## 2019-04-13 PROCEDURE — 95806 SLEEP STUDY UNATT&RESP EFFT: CPT

## 2019-04-13 PROCEDURE — 63700000 HC SELF-ADMINISTRABLE DRUG: Performed by: STUDENT IN AN ORGANIZED HEALTH CARE EDUCATION/TRAINING PROGRAM

## 2019-04-13 PROCEDURE — 63600000 HC DRUGS/DETAIL CODE: Performed by: STUDENT IN AN ORGANIZED HEALTH CARE EDUCATION/TRAINING PROGRAM

## 2019-04-13 PROCEDURE — 93306 TTE W/DOPPLER COMPLETE: CPT

## 2019-04-13 PROCEDURE — 99233 SBSQ HOSP IP/OBS HIGH 50: CPT | Performed by: HOSPITALIST

## 2019-04-13 PROCEDURE — 25000000 HC PHARMACY GENERAL: Performed by: STUDENT IN AN ORGANIZED HEALTH CARE EDUCATION/TRAINING PROGRAM

## 2019-04-13 PROCEDURE — 93306 TTE W/DOPPLER COMPLETE: CPT | Mod: 26 | Performed by: INTERNAL MEDICINE

## 2019-04-13 RX ORDER — AMLODIPINE BESYLATE 5 MG/1
5 TABLET ORAL DAILY
Status: DISCONTINUED | OUTPATIENT
Start: 2019-04-13 | End: 2019-04-15

## 2019-04-13 RX ADMIN — CARVEDILOL 6.25 MG: 3.12 TABLET, FILM COATED ORAL at 07:41

## 2019-04-13 RX ADMIN — FINASTERIDE 5 MG: 5 TABLET, FILM COATED ORAL at 07:41

## 2019-04-13 RX ADMIN — METHYLPREDNISOLONE SODIUM SUCCINATE 40 MG: 40 INJECTION, POWDER, FOR SOLUTION INTRAMUSCULAR; INTRAVENOUS at 06:54

## 2019-04-13 RX ADMIN — METHYLPREDNISOLONE SODIUM SUCCINATE 40 MG: 40 INJECTION, POWDER, FOR SOLUTION INTRAMUSCULAR; INTRAVENOUS at 17:13

## 2019-04-13 RX ADMIN — HEPARIN SODIUM 5000 UNITS: 5000 INJECTION INTRAVENOUS; SUBCUTANEOUS at 06:54

## 2019-04-13 RX ADMIN — HEPARIN SODIUM 5000 UNITS: 5000 INJECTION INTRAVENOUS; SUBCUTANEOUS at 22:19

## 2019-04-13 RX ADMIN — IPRATROPIUM BROMIDE AND ALBUTEROL SULFATE 3 ML: 2.5; .5 SOLUTION RESPIRATORY (INHALATION) at 17:14

## 2019-04-13 RX ADMIN — IPRATROPIUM BROMIDE AND ALBUTEROL SULFATE 3 ML: 2.5; .5 SOLUTION RESPIRATORY (INHALATION) at 22:20

## 2019-04-13 RX ADMIN — CARVEDILOL 6.25 MG: 3.12 TABLET, FILM COATED ORAL at 17:13

## 2019-04-13 RX ADMIN — ATORVASTATIN CALCIUM 10 MG: 10 TABLET, FILM COATED ORAL at 07:41

## 2019-04-13 RX ADMIN — PANTOPRAZOLE SODIUM 20 MG: 20 TABLET, DELAYED RELEASE ORAL at 07:41

## 2019-04-13 RX ADMIN — BUDESONIDE 0.5 MG: 0.5 INHALANT ORAL at 17:13

## 2019-04-13 RX ADMIN — HEPARIN SODIUM 5000 UNITS: 5000 INJECTION INTRAVENOUS; SUBCUTANEOUS at 13:52

## 2019-04-13 RX ADMIN — TAMSULOSIN HYDROCHLORIDE 0.4 MG: 0.4 CAPSULE ORAL at 07:41

## 2019-04-13 RX ADMIN — FAMOTIDINE 20 MG: 20 TABLET, FILM COATED ORAL at 07:41

## 2019-04-13 RX ADMIN — AZITHROMYCIN DIHYDRATE 500 MG: 500 INJECTION, POWDER, LYOPHILIZED, FOR SOLUTION INTRAVENOUS at 17:13

## 2019-04-13 RX ADMIN — AMLODIPINE BESYLATE 5 MG: 5 TABLET ORAL at 08:07

## 2019-04-13 RX ADMIN — IPRATROPIUM BROMIDE AND ALBUTEROL SULFATE 3 ML: 2.5; .5 SOLUTION RESPIRATORY (INHALATION) at 13:45

## 2019-04-13 NOTE — PROGRESS NOTES
Internal Medicine  Daily Progress Note       SUBJECTIVE   This is a 83 y.o. year-old male admitted on 2019 with COPD exacerbation (CMS/HCC) (HCC) [J44.1].    Interval History: No acute events overnight. Had difficulty sleeping. Improving shortness of breath and cough. Nervous about going home, feels unsafe with breathing.      OBJECTIVE      Vital signs in last 24 hours:  Temp:  [36.2 °C (97.2 °F)-36.8 °C (98.2 °F)] 36.5 °C (97.7 °F)  Heart Rate:  [] 98  Resp:  [16-18] 16  BP: (126-174)/() 152/93  Temp (24hrs), Av.4 °C (97.6 °F), Min:36.2 °C (97.2 °F), Max:36.8 °C (98.2 °F)    Intake/Output     None          PHYSICAL EXAMINATION      Physical Exam   Constitutional: He is oriented to person, place, and time. He appears well-developed and well-nourished. No distress.   HENT:   Head: Normocephalic and atraumatic.   Eyes: Pupils are equal, round, and reactive to light. Conjunctivae and EOM are normal. No scleral icterus.   Neck: Normal range of motion. Neck supple. No JVD present.   Cardiovascular: Normal rate, regular rhythm and normal heart sounds.    No murmur heard.  Pulmonary/Chest: Effort normal and breath sounds normal. No respiratory distress. He has no wheezes. He has no rales.   Abdominal: Soft. Bowel sounds are normal. He exhibits no distension and no mass. There is no tenderness. There is no guarding.   Neurological: He is alert and oriented to person, place, and time.   Skin: Skin is warm and dry. Capillary refill takes less than 2 seconds. He is not diaphoretic.   Psychiatric: He has a normal mood and affect. His behavior is normal. Judgment and thought content normal.   Nursing note and vitals reviewed.       LINES, CATHETERS, DRAINS, AIRWAYS, AND WOUNDS   Lines, Drains, Airways, Wounds:  Peripheral IV 19 Left Wrist (Active)   Number of days: 2       Comments:      LABS / IMAGING / TELE      Labs: No new labs    Imaging: No new imaging     ECG/Telemetry: NSR/ST no  events    ASSESSMENT AND PLAN      * COPD exacerbation (CMS/[facility]) ([facility])   Assessment & Plan    Severe COPD on 2L O2 at home; unclear trigger for exacerbation  - Improved air movement, less wheezing, still on 3L from baseline 2L    - Duonebs [id number]   - Budesonide 0.5 neb BID   - azithro x 5 days (Start 4/11/19 End 4/15/19)  - Prednisone 40mg oral daily   - mucinex/tesslon  - pulm consulted, appreciate recs     Hypertension   Assessment & Plan    Chronic HTN   - Hypertensive this AM; may be related to steroids     - Carvedilol 6.25mg oral BID; can uptitrate as well  - Start amlodipine 5mg oral daily      Abdominal pain   Assessment & Plan    Patient complaining of epigastric pain that radiates to the umbilicus and abdominal distension  Associated with coughing  Normal bowel movements  Exacerbated by coughing  CTAP with contrast negative for acute abnormality    Likely MSK from cough    - tylenol prn       Chronic kidney disease (CKD), stage III (moderate) (CMS/[facility]) ([facility])   Assessment & Plan    Baseline Cr 1.4-1.5, currently 1.2  2/2 AIN/DRESS from vanco    - monitor BMP  - cautious with IV contrast          VTE Assessment: Padua    Code Status: Full Code  Estimated discharge date: 4/14/2019     ATTENDING DOCUMENTATION  ALSO SEE ATTENDING ATTESTATION SECTION OF NOTE

## 2019-04-13 NOTE — PLAN OF CARE
Problem: Patient Care Overview  Goal: Individualization & Mutuality   04/12/19 0106   Individualization   Patient Specific Preferences prefers to be called Edward   Patient Specific Goals to be discharged home   Patient Specific Interventions care clustered       Problem: Chronic Obstructive Pulmonary Disease (Adult)  Goal: Signs and Symptoms of Listed Potential Problems Will be Absent, Minimized or Managed (Chronic Obstructive Pulmonary Disease)  Outcome: Ongoing (interventions implemented as appropriate)   04/12/19 0106   Chronic Obstructive Pulmonary Disease   Problems Assessed (Chronic Obstructive Pulmonary Disease (COPD)) all   Problems Present (Chronic Obstructive Pulmonary Disease (COPD)) respiratory compromise       Problem: Fall Risk (Adult)  Goal: Identify Related Risk Factors and Signs and Symptoms  Outcome: Ongoing (interventions implemented as appropriate)   04/12/19 0106   Fall Risk   Related Risk Factors (Fall Risk) environment unfamiliar   Signs and Symptoms (Fall Risk) presence of risk factors

## 2019-04-14 PROCEDURE — 25000000 HC PHARMACY GENERAL: Performed by: STUDENT IN AN ORGANIZED HEALTH CARE EDUCATION/TRAINING PROGRAM

## 2019-04-14 PROCEDURE — 99232 SBSQ HOSP IP/OBS MODERATE 35: CPT | Performed by: HOSPITALIST

## 2019-04-14 PROCEDURE — 21400000 HC ROOM AND CARE CCU/INTERMEDIATE

## 2019-04-14 PROCEDURE — 63600000 HC DRUGS/DETAIL CODE: Performed by: STUDENT IN AN ORGANIZED HEALTH CARE EDUCATION/TRAINING PROGRAM

## 2019-04-14 PROCEDURE — 63700000 HC SELF-ADMINISTRABLE DRUG: Performed by: STUDENT IN AN ORGANIZED HEALTH CARE EDUCATION/TRAINING PROGRAM

## 2019-04-14 RX ORDER — FLUTICASONE PROPIONATE 50 MCG
2 SPRAY, SUSPENSION (ML) NASAL DAILY PRN
Status: DISCONTINUED | OUTPATIENT
Start: 2019-04-14 | End: 2019-04-15 | Stop reason: HOSPADM

## 2019-04-14 RX ORDER — PREDNISONE 20 MG/1
40 TABLET ORAL DAILY
Status: DISCONTINUED | OUTPATIENT
Start: 2019-04-14 | End: 2019-04-15 | Stop reason: HOSPADM

## 2019-04-14 RX ORDER — CARVEDILOL 12.5 MG/1
12.5 TABLET ORAL 2 TIMES DAILY WITH MEALS
Status: DISCONTINUED | OUTPATIENT
Start: 2019-04-14 | End: 2019-04-15

## 2019-04-14 RX ORDER — CARVEDILOL 6.25 MG/1
6.25 TABLET ORAL ONCE
Status: COMPLETED | OUTPATIENT
Start: 2019-04-14 | End: 2019-04-14

## 2019-04-14 RX ADMIN — ATORVASTATIN CALCIUM 10 MG: 10 TABLET, FILM COATED ORAL at 07:53

## 2019-04-14 RX ADMIN — CARVEDILOL 6.25 MG: 3.12 TABLET, FILM COATED ORAL at 07:53

## 2019-04-14 RX ADMIN — PREDNISONE 40 MG: 20 TABLET ORAL at 11:05

## 2019-04-14 RX ADMIN — BUDESONIDE 0.5 MG: 0.5 INHALANT ORAL at 06:26

## 2019-04-14 RX ADMIN — AZITHROMYCIN DIHYDRATE 500 MG: 500 INJECTION, POWDER, LYOPHILIZED, FOR SOLUTION INTRAVENOUS at 16:37

## 2019-04-14 RX ADMIN — HEPARIN SODIUM 5000 UNITS: 5000 INJECTION INTRAVENOUS; SUBCUTANEOUS at 06:26

## 2019-04-14 RX ADMIN — IPRATROPIUM BROMIDE AND ALBUTEROL SULFATE 3 ML: 2.5; .5 SOLUTION RESPIRATORY (INHALATION) at 22:24

## 2019-04-14 RX ADMIN — IPRATROPIUM BROMIDE AND ALBUTEROL SULFATE 3 ML: 2.5; .5 SOLUTION RESPIRATORY (INHALATION) at 17:30

## 2019-04-14 RX ADMIN — CARVEDILOL 6.25 MG: 6.25 TABLET, FILM COATED ORAL at 14:09

## 2019-04-14 RX ADMIN — HEPARIN SODIUM 5000 UNITS: 5000 INJECTION INTRAVENOUS; SUBCUTANEOUS at 22:24

## 2019-04-14 RX ADMIN — FAMOTIDINE 20 MG: 20 TABLET, FILM COATED ORAL at 07:53

## 2019-04-14 RX ADMIN — FINASTERIDE 5 MG: 5 TABLET, FILM COATED ORAL at 07:52

## 2019-04-14 RX ADMIN — FLUTICASONE PROPIONATE 2 SPRAY: 50 SPRAY, METERED NASAL at 17:39

## 2019-04-14 RX ADMIN — AMLODIPINE BESYLATE 5 MG: 5 TABLET ORAL at 07:53

## 2019-04-14 RX ADMIN — TAMSULOSIN HYDROCHLORIDE 0.4 MG: 0.4 CAPSULE ORAL at 07:52

## 2019-04-14 RX ADMIN — IPRATROPIUM BROMIDE AND ALBUTEROL SULFATE 3 ML: 2.5; .5 SOLUTION RESPIRATORY (INHALATION) at 02:38

## 2019-04-14 RX ADMIN — HEPARIN SODIUM 5000 UNITS: 5000 INJECTION INTRAVENOUS; SUBCUTANEOUS at 14:09

## 2019-04-14 RX ADMIN — METHYLPREDNISOLONE SODIUM SUCCINATE 40 MG: 40 INJECTION, POWDER, FOR SOLUTION INTRAMUSCULAR; INTRAVENOUS at 06:26

## 2019-04-14 RX ADMIN — PANTOPRAZOLE SODIUM 20 MG: 20 TABLET, DELAYED RELEASE ORAL at 07:52

## 2019-04-14 RX ADMIN — CARVEDILOL 12.5 MG: 12.5 TABLET, FILM COATED ORAL at 16:37

## 2019-04-14 RX ADMIN — IPRATROPIUM BROMIDE AND ALBUTEROL SULFATE 3 ML: 2.5; .5 SOLUTION RESPIRATORY (INHALATION) at 06:26

## 2019-04-14 RX ADMIN — IPRATROPIUM BROMIDE AND ALBUTEROL SULFATE 3 ML: 2.5; .5 SOLUTION RESPIRATORY (INHALATION) at 14:00

## 2019-04-14 RX ADMIN — BUDESONIDE 0.5 MG: 0.5 INHALANT ORAL at 17:40

## 2019-04-14 NOTE — PROGRESS NOTES
Internal Medicine  Daily Progress Note       SUBJECTIVE   This is a 83 y.o. year-old male admitted on 2019 with COPD exacerbation (CMS/HCC) (HCC) [J44.1].    Interval History: CALVIN overnight. Resting comfortably this AM. Reports dyspnea with ambulation, otherwise without complaint. BP somewhat elevated this AM likely in setting of steroids.     OBJECTIVE      Vital signs in last 24 hours:  Temp:  [36.2 °C (97.2 °F)-36.8 °C (98.3 °F)] 36.4 °C (97.6 °F)  Heart Rate:  [] 96  Resp:  [18-20] 20  BP: (134-190)/(75-96) 179/80  Temp (24hrs), Av.5 °C (97.7 °F), Min:36.2 °C (97.2 °F), Max:36.8 °C (98.3 °F)    Intake/Output     None          PHYSICAL EXAMINATION      Physical Exam  Constitutional: He is oriented to person, place, and time. He appears well-developed and well-nourished. No distress.   HENT:   Head: Normocephalic and atraumatic.   Eyes: Pupils are equal, round, and reactive to light. Conjunctivae and EOM are normal. No scleral icterus.   Neck: Normal range of motion. Neck supple. No JVD present.   Cardiovascular: Normal rate, regular rhythm and normal heart sounds.    No murmur heard.  Pulmonary/Chest: Effort normal and breath sounds normal. No respiratory distress. He has no wheezes. He has no rales.   Abdominal: Soft. Bowel sounds are normal. He exhibits no distension and no mass. There is no tenderness. There is no guarding.   Neurological: He is alert and oriented to person, place, and time.   Skin: Skin is warm and dry. Capillary refill takes less than 2 seconds. He is not diaphoretic.   Psychiatric: He has a normal mood and affect. His behavior is normal. Judgment and thought content normal.   Nursing note and vitals reviewed.     LINES, CATHETERS, DRAINS, AIRWAYS, AND WOUNDS   Lines, Drains, Airways, Wounds:  Peripheral IV 19 Left Wrist (Active)   Number of days: 3       Comments:      LABS / IMAGING / TELE      Labs  Lab Results   Component Value Date    GLUCOSE 192 (H) 2019     CALCIUM 8.7 (L) 04/12/2019     04/12/2019    K 4.3 04/12/2019    CO2 28 04/12/2019     04/12/2019    BUN 21 (H) 04/12/2019    CREATININE 1.1 04/12/2019     Lab Results   Component Value Date    WBC 9.85 04/12/2019    HGB 13.3 (L) 04/12/2019    HCT 40.3 04/12/2019    MCV 95.7 04/12/2019     04/12/2019     Imaging  I have independently reviewed the pertinent imaging from the last 24 hrs.    ECG/Telemetry  I have independently reviewed the telemetry. No events for the last 24 hours.    ASSESSMENT AND PLAN      * COPD exacerbation (CMS/[facility]) ([facility])   Assessment & Plan    Severe COPD on 2L O2 at home; unclear trigger for exacerbation  - Improved air movement, less wheezing, still on 3L from baseline 2L    - Duonebs [id number]   - Budesonide 0.5 neb BID   - azithro x 5 days (Start 4/11/19 End 4/15/19)  - Prednisone 40mg oral daily   - mucinex/tesslon  - pulm consulted, appreciate recs     Hypertension   Assessment & Plan    Chronic HTN   - Hypertensive this AM; may be related to steroids     - Increased Carvedilol 12.5 mg oral BID; can uptitrate as well  - Start amlodipine 5 mg oral daily      Abdominal pain   Assessment & Plan    Patient complaining of epigastric pain that radiates to the umbilicus and abdominal distension  Associated with coughing  Normal bowel movements  Exacerbated by coughing  CTAP with contrast negative for acute abnormality    Likely MSK from cough    - tylenol prn       Chronic kidney disease (CKD), stage III (moderate) (CMS/[facility]) ([facility])   Assessment & Plan    Baseline Cr 1.4-1.5, currently 1.2  2/2 AIN/DRESS from vanco    - monitor BMP  - cautious with IV contrast          VTE Assessment: Padua    Code Status: Full Code  Estimated discharge date: 4/14/2019     ATTENDING DOCUMENTATION  ALSO SEE ATTENDING ATTESTATION SECTION OF NOTE

## 2019-04-14 NOTE — PLAN OF CARE
Problem: Chronic Obstructive Pulmonary Disease (Adult)  Goal: Signs and Symptoms of Listed Potential Problems Will be Absent, Minimized or Managed (Chronic Obstructive Pulmonary Disease)  Outcome: Ongoing (interventions implemented as appropriate)   04/13/19 3347   Chronic Obstructive Pulmonary Disease   Problems Assessed (Chronic Obstructive Pulmonary Disease (COPD)) all   Problems Present (Chronic Obstructive Pulmonary Disease (COPD)) infection;respiratory compromise;situational response

## 2019-04-14 NOTE — PROGRESS NOTES
Patient: Zuhair Luong Jr  Location: [place] [address]  MRN: [id number]  Today's date: 4/14/2019    Attempted to see patient for therapy. Unable due to  (pt ind per RN and PT note; will d/c order).

## 2019-04-15 ENCOUNTER — DOCUMENTATION (OUTPATIENT)
Dept: SLEEP MEDICINE | Facility: HOSPITAL | Age: 84
End: 2019-04-15

## 2019-04-15 VITALS
OXYGEN SATURATION: 96 % | SYSTOLIC BLOOD PRESSURE: 131 MMHG | HEART RATE: 87 BPM | WEIGHT: 175 LBS | RESPIRATION RATE: 20 BRPM | TEMPERATURE: 98 F | HEIGHT: 65 IN | BODY MASS INDEX: 29.16 KG/M2 | DIASTOLIC BLOOD PRESSURE: 66 MMHG

## 2019-04-15 PROCEDURE — 63700000 HC SELF-ADMINISTRABLE DRUG: Performed by: STUDENT IN AN ORGANIZED HEALTH CARE EDUCATION/TRAINING PROGRAM

## 2019-04-15 PROCEDURE — 63600000 HC DRUGS/DETAIL CODE: Performed by: STUDENT IN AN ORGANIZED HEALTH CARE EDUCATION/TRAINING PROGRAM

## 2019-04-15 PROCEDURE — 25000000 HC PHARMACY GENERAL: Performed by: STUDENT IN AN ORGANIZED HEALTH CARE EDUCATION/TRAINING PROGRAM

## 2019-04-15 PROCEDURE — 99239 HOSP IP/OBS DSCHRG MGMT >30: CPT | Performed by: HOSPITALIST

## 2019-04-15 RX ORDER — PREDNISONE 10 MG/1
TABLET ORAL
Qty: 53 TABLET | Refills: 0 | Status: SHIPPED | OUTPATIENT
Start: 2019-04-15 | End: 2019-12-23 | Stop reason: ALTCHOICE

## 2019-04-15 RX ORDER — ALBUTEROL SULFATE 0.83 MG/ML
2.5 SOLUTION RESPIRATORY (INHALATION) EVERY 8 HOURS PRN
COMMUNITY

## 2019-04-15 RX ORDER — PREDNISONE 10 MG/1
TABLET ORAL
Qty: 30 TABLET | Refills: 0 | Status: SHIPPED | OUTPATIENT
Start: 2019-04-15 | End: 2019-04-15

## 2019-04-15 RX ORDER — BUDESONIDE 0.5 MG/2ML
0.5 INHALANT ORAL
COMMUNITY

## 2019-04-15 RX ORDER — CARVEDILOL 12.5 MG/1
12.5 TABLET ORAL 2 TIMES DAILY WITH MEALS
Qty: 60 TABLET | Refills: 0 | Status: SHIPPED | OUTPATIENT
Start: 2019-04-15 | End: 2019-12-23 | Stop reason: DRUGHIGH

## 2019-04-15 RX ORDER — AMLODIPINE BESYLATE 10 MG/1
10 TABLET ORAL DAILY
Qty: 30 TABLET | Refills: 0 | Status: SHIPPED | OUTPATIENT
Start: 2019-04-16 | End: 2019-12-23 | Stop reason: DRUGHIGH

## 2019-04-15 RX ORDER — AMLODIPINE BESYLATE 5 MG/1
5 TABLET ORAL DAILY
Status: DISCONTINUED | OUTPATIENT
Start: 2019-04-15 | End: 2019-04-15

## 2019-04-15 RX ORDER — CARVEDILOL 12.5 MG/1
12.5 TABLET ORAL 2 TIMES DAILY WITH MEALS
Status: DISCONTINUED | OUTPATIENT
Start: 2019-04-15 | End: 2019-04-15 | Stop reason: HOSPADM

## 2019-04-15 RX ORDER — AMLODIPINE BESYLATE 10 MG/1
10 TABLET ORAL DAILY
Status: DISCONTINUED | OUTPATIENT
Start: 2019-04-15 | End: 2019-04-15 | Stop reason: HOSPADM

## 2019-04-15 RX ORDER — AMLODIPINE BESYLATE 10 MG/1
10 TABLET ORAL DAILY
Status: DISCONTINUED | OUTPATIENT
Start: 2019-04-15 | End: 2019-04-15

## 2019-04-15 RX ORDER — CARVEDILOL 25 MG/1
25 TABLET ORAL 2 TIMES DAILY WITH MEALS
Status: DISCONTINUED | OUTPATIENT
Start: 2019-04-15 | End: 2019-04-15

## 2019-04-15 RX ADMIN — IPRATROPIUM BROMIDE AND ALBUTEROL SULFATE 3 ML: 2.5; .5 SOLUTION RESPIRATORY (INHALATION) at 10:01

## 2019-04-15 RX ADMIN — PANTOPRAZOLE SODIUM 20 MG: 20 TABLET, DELAYED RELEASE ORAL at 09:13

## 2019-04-15 RX ADMIN — BUDESONIDE 0.5 MG: 0.5 INHALANT ORAL at 06:16

## 2019-04-15 RX ADMIN — AMLODIPINE BESYLATE 10 MG: 10 TABLET ORAL at 09:33

## 2019-04-15 RX ADMIN — PREDNISONE 40 MG: 20 TABLET ORAL at 09:13

## 2019-04-15 RX ADMIN — CARVEDILOL 12.5 MG: 12.5 TABLET, FILM COATED ORAL at 09:12

## 2019-04-15 RX ADMIN — TAMSULOSIN HYDROCHLORIDE 0.4 MG: 0.4 CAPSULE ORAL at 09:13

## 2019-04-15 RX ADMIN — IPRATROPIUM BROMIDE AND ALBUTEROL SULFATE 3 ML: 2.5; .5 SOLUTION RESPIRATORY (INHALATION) at 14:31

## 2019-04-15 RX ADMIN — HEPARIN SODIUM 5000 UNITS: 5000 INJECTION INTRAVENOUS; SUBCUTANEOUS at 06:17

## 2019-04-15 RX ADMIN — CARVEDILOL 12.5 MG: 12.5 TABLET, FILM COATED ORAL at 17:18

## 2019-04-15 RX ADMIN — ATORVASTATIN CALCIUM 10 MG: 10 TABLET, FILM COATED ORAL at 09:09

## 2019-04-15 RX ADMIN — FINASTERIDE 5 MG: 5 TABLET, FILM COATED ORAL at 09:12

## 2019-04-15 RX ADMIN — FAMOTIDINE 20 MG: 20 TABLET, FILM COATED ORAL at 09:12

## 2019-04-15 RX ADMIN — IPRATROPIUM BROMIDE AND ALBUTEROL SULFATE 3 ML: 2.5; .5 SOLUTION RESPIRATORY (INHALATION) at 06:16

## 2019-04-15 NOTE — DISCHARGE INSTRUCTIONS
Mr. Luong,    You were admitted to [facility] on 4/11/2019 with shortness of breath and wheezing concerning for COPD exacerbation. You were treated with intravenous steroids, nebulizer therapy, and azithromycin- an antibiotic medication sometimes used to treat COPD exacerbation. Imaging and labwork did not show any evidence of infection or pneumonia.     You were evaluated by Dr. Montano during your hospitalization with recommendations to continue oral steroids and to follow up in the pulmonology outpatient clinic upon discharge.    You had an apnea link that noted you have obstructive sleep apnea.  Please follow-up with Dr. Montano to discuss treatment options for sleep apnea at your next visit.    You were also found to have elevated blood pressures. You were given additional blood pressure medications to control your pressure.    On 4/15/19, you were deemed medically stable for discharge with the following instructions:    Important Follow Up Instructions:  - Follow up with your primary care physician, Dr. Bella Walters, within 7-10 days of discharge. Your blood pressure should be evaluated at this time for any necessary changes to your blood pressure medications.    - Follow up with your pulmonologist, Dr. Montano, within 2 weeks of discharge for monitoring of your COPD. You had an abnormal sleep study that indicates you have sleep apnea.  This apnea link study should be addressed during your visit as well.    Medication Changes  - You are to complete a tapering course of prednisone for your COPD.  Please take 40 mg (4 tablets) daily for 5 days, followed by 30 mg (3 tablets) daily for 5 days, followed by 20 mg (2 tablets) for 5 days, followed by 10 mg (1 tablet) daily for 5 days, followed by 5 mg (half tablet) for 5 days to complete your course.    - Begin taking amlodipine (NORVASC) 10 mg daily for blood pressure control    - Begin taking increased dosing carvedilol (COREG) 12.5 mg twice daily for blood  pressure control    It has been a pleasure participating in your care and we wish you the best going forward.

## 2019-04-15 NOTE — PROGRESS NOTES
Sleep Study Note    Zuhair Luong Jr is a 83 y.o. male    There were no vitals filed for this visit.    No orders of the defined types were placed in this encounter.    Inpatient Sleep Apnea Screening was performed: AHI 13.5 (mild) with SAO2 desats to 88%.  Study sent to Dr. Tran for interpretation.    Larry Menard  04/15/19 1:15 PM

## 2019-04-15 NOTE — DISCHARGE SUMMARY
Internal Medicine  Inpatient Discharge Summary        BRIEF OVERVIEW   Admitting Provider: Chaparrita Monroe MD  Attending Provider: Beverly Crowe DO Attending phys phone: [phone]    PCP: Bella Walters [phone]    Admission Date: 4/11/2019  Discharge Date: 4/15/2019     DISCHARGE DIAGNOSES      Primary Discharge Diagnosis  COPD exacerbation (CMS/HCC) ([facility])    Secondary Discharge Diagnoses  Active Hospital Problems    Diagnosis Date Noted   • COPD exacerbation (CMS/HCC) ([facility]) 04/11/2019   • Chronic kidney disease (CKD), stage III (moderate) (CMS/HCC) ([facility]) 04/11/2019   • Abdominal pain 04/11/2019   • Hypertension 04/11/2019      Resolved Hospital Problems    Diagnosis Date Noted Date Resolved   No resolved problems to display.       Active Problem List on Day of Discharge  Patient Active Problem List   Diagnosis   • COPD exacerbation (CMS/HCC) ([facility])   • Chronic kidney disease (CKD), stage III (moderate) (CMS/[facility]) ([facility])   • Abdominal pain   • Hypertension     SUMMARY OF HOSPITALIZATION      Presenting Problem/History of Present Illness  COPD exacerbation (CMS/[facility]) ([facility])    This is a 83 y.o. year-old male admitted on 4/11/2019 with COPD exacerbation (CMS/HCC) ([facility]) [J44.1].    Hospital Course  Mr. 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 He was hypoxic on his home 2L NC at rest and ambulation, cough, wheezing, with clear chest x-ray. He was treated with methylprednisolone 40mg x1mizvo, azithromycin 500mg x3 days, bronchodilators. His symptoms improved. His steroids were tapered for prednisone 40 mg oral daily. He had a TTE that was unremarkable. He had an apnea link with AHI: 14.     # Acute exacerbation of COPD   -  Triggered by recent URI   - O2 supplementation. Will be getting concentrator delivered   - Continue prednisone 40mg oral daily and decrease by 5mg q5 days  - Continue Tiotropium   - Continue montelukast  - Continue albuterol nebulizer PRN   - Continue budesonide nebulizer q12 hours   - Recommend pulmonary rehab     # ANNIE   - Sleep apnea link with AHI:14  - Outpatient follow-up with pulm re: CPAP or BiPAP     # HTN   - Increased carvedilol to 12.5mg oral BID   - Added amlodipine 10mg oral daily       Exam on Day of Discharge  Physical Exam  Constitutional: He is oriented to person, place, and time. He appears well-developed and well-nourished. No distress.   HENT:   Head: Normocephalic and atraumatic.   Eyes: Pupils are equal, round, and reactive to light. Conjunctivae and EOM are normal. No scleral icterus.   Neck: Normal range of motion. Neck supple. No JVD present.   Cardiovascular: Normal rate, regular rhythm and normal heart sounds.    No murmur heard.  Pulmonary/Chest: Effort normal and breath sounds normal. No respiratory distress. He has no wheezes. He has no rales.   Abdominal: Soft. Bowel sounds are normal. He exhibits no distension and no mass. There is no tenderness. There is no guarding.   Neurological: He is alert and oriented to person, place, and time.   Skin: Skin is warm and dry. Capillary refill takes less than 2 seconds. He is not diaphoretic.   Psychiatric: He has a normal mood and affect. His behavior is normal. Judgment and thought content normal.   Nursing note and vitals reviewed.     Consults During Admission  IP CONSULT TO PULMONOLOGY/SLEEP MEDICINE  IP CONSULT TO PULMONOLOGY/SLEEP MEDICINE  IP CONSULT TO CASE MANAGEMENT  IP CONSULT TO RESPIRATORY CARE  IP CONSULT TO PULMONARY REHAB    DISCHARGE MEDICATIONS   New, changed, or stopped medications from this admission:       Medication List      START taking these medications    amLODIPine 10 mg tablet  Commonly known as:  NORVASC  Start  taking on:  4/16/2019  Take 1 tablet (10 mg total) by mouth daily.  Dose:  10 mg     predniSONE 10 mg tablet  Commonly known as:  DELTASONE  40 mg (4 tablets) for 5 days, 3 tablets for 5 days, 2 tablets for 5 days, 1 tablet for 5 days, 5 mg (half tablet) for 5 days.        CHANGE how you take these medications    carvedilol 12.5 mg tablet  Commonly known as:  COREG  Take 1 tablet (12.5 mg total) by mouth 2 (two) times a day with meals.  Dose:  12.5 mg  What changed:  · medication strength  · how much to take  · when to take this        CONTINUE taking these medications    albuterol 2.5 mg /3 mL (0.083 %) nebulizer solution  Take 2.5 mg by nebulization 3 (three) times a day as needed for wheezing.  Dose:  2.5 mg     atorvastatin 10 mg tablet  Commonly known as:  LIPITOR  Take 10 mg by mouth daily.  Dose:  10 mg     budesonide 0.5 mg/2 mL nebulizer solution  Commonly known as:  PULMICORT  Take 0.5 mg by nebulization 2 (two) times a day. Rinse mouth with water after use to reduce aftertaste and incidence of candidiasis. Do not swallow.  Dose:  0.5 mg     famotidine 40 mg tablet  Commonly known as:  PEPCID  Take 40 mg by mouth 2 times daily.  Dose:  40 mg     finasteride 5 mg tablet  Commonly known as:  PROSCAR  Take 5 mg by mouth daily.  Dose:  5 mg     montelukast 10 mg tablet  Commonly known as:  SINGULAIR  Take by mouth nightly.     omeprazole 20 mg capsule  Commonly known as:  PriLOSEC  Take 20 mg by mouth daily before breakfast.  Dose:  20 mg     SPIRIVA WITH HANDIHALER 18 mcg per inhalation capsule  Place 18 mcg into inhaler and inhale daily.  Dose:  18 mcg  Generic drug:  tiotropium     tamsulosin 0.4 mg capsule  Commonly known as:  FLOMAX  Take 0.4 mg by mouth 2 times daily.  Dose:  0.4 mg            Non-Medication orders at discharge:   1.) pulmonary rehab         PROCEDURES / LABS / IMAGING      Operative Procedures  NONE    Other Procedures  None     Pertinent Labs  Lab Results   Component Value Date     GLUCOSE 192 (H) 04/12/2019    CALCIUM 8.7 (L) 04/12/2019     04/12/2019    K 4.3 04/12/2019    CO2 28 04/12/2019     04/12/2019    BUN 21 (H) 04/12/2019    CREATININE 1.1 04/12/2019     Lab Results   Component Value Date    WBC 9.85 04/12/2019    HGB 13.3 (L) 04/12/2019    HCT 40.3 04/12/2019    MCV 95.7 04/12/2019     04/12/2019     Pertinent Imaging  X-ray Chest 2 Views    Result Date: 4/11/2019  IMPRESSION: Mild hyperinflation.  No dense effusion or dense consolidation.    Ct Abdomen Pelvis With Iv Contrast    Result Date: 4/11/2019  IMPRESSION: 1.  No pulmonary embolism. 2.  Redemonstration of the triangular and linear opacity in the lateral right upper lobe, not significantly changed compared to 12/17/2018. 3.  No evidence of acute inflammatory or obstructive process in the abdomen or pelvis. 4.  New moderate compression deformity of T6 compared to 12/17/2018.    Ct Chest Pulmonary Embolism With Iv Contrast    Result Date: 4/11/2019  IMPRESSION: 1.  No pulmonary embolism. 2.  Redemonstration of the triangular and linear opacity in the lateral right upper lobe, not significantly changed compared to 12/17/2018. 3.  No evidence of acute inflammatory or obstructive process in the abdomen or pelvis. 4.  New moderate compression deformity of T6 compared to 12/17/2018.    OUTPATIENT  FOLLOW-UP / REFERRALS / PENDING TESTS      Outpatient Follow-Up Appointments  Encounter Information     You do not currently have any appointments scheduled.        Referrals  No orders of the defined types were placed in this encounter.    Test Results Pending at Discharge  Unresulted Labs     None        Important Issues to Address in Follow-Up  1.) Pulmonary follow-up to discuss oxygen concentrator, ANNIE/CPAP, pulm rehab   2.) PCP follow-up within 1-2 weeks     DISCHARGE DISPOSITION      Disposition: Home     Code Status At Discharge: Full Code    Physician Order for Life-Sustaining Treatment Document Status      No  documents found                 ATTENDING DOCUMENTATION  ALSO SEE ATTENDING ATTESTATION SECTION OF NOTE     I saw and evaluated the patient.  I discussed the case with the Resident and agree with the findings and plan as documented in the note except for my comments below or within the additional notes today.    Reviewed and edited as appropriate.  PCP Dr. Walters updated.  He will see Dr. Montano in the office.  Coordination 35min  Beverly Crowe DO [id number]

## 2019-04-15 NOTE — NURSING NOTE
Pt dressed. Wife in with home o2. Discharge instructions printed and reviewed with pt. All questions answered. Pt assisted to wheelchair. Pt assisted off floor.

## 2019-04-15 NOTE — PLAN OF CARE
Problem: Chronic Obstructive Pulmonary Disease (Adult)  Goal: Signs and Symptoms of Listed Potential Problems Will be Absent, Minimized or Managed (Chronic Obstructive Pulmonary Disease)  Outcome: Ongoing (interventions implemented as appropriate)   04/15/19 0922   Chronic Obstructive Pulmonary Disease   Problems Assessed (Chronic Obstructive Pulmonary Disease (COPD)) all   Problems Present (Chronic Obstructive Pulmonary Disease (COPD)) infection;respiratory compromise

## 2019-04-15 NOTE — NURSING NOTE
Monitor hr 88 nsr. Pt resting in bed. Pt going to order meal. Pt assessed. SPO2 96% 2L NC. Lungs with expiratory wheeze at bases. +Pulses -edema.  Shift med admin by student RN and instructor. Pt offers no complaints. Pt denies pain. Call bell within reach. Bed in lowest position. Bed alarm on. Will continue to monitor.

## 2019-04-15 NOTE — PROGRESS NOTES
"   Pulmonary Progress Note       SUBJECTIVE   Patient was able to walk to the bathroom over the weekend, but without his oxygen he was getting winded. He understands he may need to be on oxygen 24/7 now. He said his Apnea Link was done, but he isn't sure what the result was.     OBJECTIVE        Vital Signs:   BP (!) 178/91 (BP Location: Right upper arm, Patient Position: Lying)   Pulse 78   Temp 36.8 °C (98.3 °F) (Oral)   Resp 20   Ht 1.651 m (5' 5\")   Wt 79.4 kg (175 lb)   SpO2 96%   BMI 29.12 kg/m²     I/O's:  No intake or output data in the 24 hours ending 04/15/19 1007    Medications:    Reviewed. Pertinent medications as below.      amLODIPine 10 mg oral Daily   atorvastatin 10 mg oral Daily   azithromycin 500 mg intravenous q24h INT   budesonide 0.5 mg nebulization BID (6a, 6p)   carvedilol 12.5 mg oral BID with meals   famotidine 20 mg oral Daily   finasteride 5 mg oral Daily   heparin (porcine) 5,000 Units subcutaneous q8h ASAEL   ipratropium-albuterol 3 mL nebulization q4h ASAEL   pantoprazole 20 mg oral Daily   predniSONE 40 mg oral Daily   tamsulosin 0.4 mg oral Daily       Anti-infectives     Start     Dose/Rate Route Frequency Ordered Stop    04/12/19 1800  azithromycin (ZITHROMAX) IVPB 500 mg in 250 mL D5W vial in bag      500 mg  250 mL/hr over 60 Minutes intravenous Every 24 hours interval 04/11/19 1917 04/17/19 5849          PHYSICAL EXAMINATION        General: The patient is in no acute distress.  Resting comfortably in bed.  HEENT: Mucous membranes are moist.  Sclera are anicteric.  Neck: Supple.  No cervical lymphadenopathy  Cardiovascular: Blowing grade II/VI systolic murmur heard best in pulmonic area. S1 and S2 are present.  Lungs: Very mild end-expiratory wheezes diffusely in all lung fields.  Abdomen: Mildly distended but soft and nontender.  Extremities: Cool and dry without edema  Neuro: Awake and alert.  Spontaneously moving all extremities       Diagnostic Data      Labs:    I have " personally reviewed all pertinent patient laboratory results. Labs of note discussed below:    ABG Results     No lab values to display.        CMP Results       04/12/19 04/11/19 12/17/18                    0642 1437 0948          138 140         K 4.3 3.9 4.5         Cl 101 103 105         CO2 28 24 27         Glucose 192 (H) 111 (H) 100 (H)         BUN 21 (H) 21 (H) 29 (H)         Creatinine 1.1 1.2 1.5 (H)         Calcium 8.7 (L) 8.8 (L) 8.9         Anion Gap 8 11 8         EGFR &gt;60.0 58.1 (L) 44.9 (L)         Comment for K at 1437 on 04/11/19:    Results obtained on plasma. Plasma Potassium values may be up to 0.4 mEQ/L less than serum values. The differences may be greater for patients with high platelet or white cell counts.        CBC Results       04/12/19 04/11/19 11/13/18                    0639 1437 1448         WBC 9.85 10.97 (H) 12.14 (H)         RBC 4.21 (L) 4.48 (L) 4.65         HGB 13.3 (L) 14.5 14.6         HCT 40.3 43.1 42.9         MCV 95.7 96.2 92.3         MCH 31.6 32.4 31.4         MCHC 33.0 33.6 34.0          314 283                     Imaging:    I have reviewed all pertinent imaging which is discussed below.    N/A      ASSESSMENT AND PLAN      AECOPD. Patient seems to have improved significantly since admission. He has frequent exacerbations. His home regimen is budesonide BID, Albuterol TID, and Spiriva. He wears oxygen intermittently at home, but understands he may need it permanently.    ANNIE. Apnea Link shows moderate ANNIE with AHI of 14.    Recommendations:  -Apnea link test has been read and shows AHI of 14, which is positive for sleep apnea  -Continue budesonide nebs BID  -Continue DuoNebs Q4h  -Continue prednisone 40 mg QD  -Start albuterol nebs Q4h PRN  -On discharge, plan for the following steroid taper: Prednisone 40 mg PO QD x5 days, then 30 mg x5 days, then 20 mg x5 days, then 10 mg QD x5 days, then 5 mg QD x5 days, then stop  -Follow up with Dr. Montano in the  office within 2 weeks of discharge with Apnea Link results       Justin Marsh DO  4/15/2019  PGY-2

## 2019-04-15 NOTE — PLAN OF CARE
Problem: Chronic Obstructive Pulmonary Disease (Adult)  Intervention: Reduce/Relieve Breathlessness   04/14/19 2140   Cognitive Interventions   Sensory Stimulation Regulation quiet environment promoted   Coping/Psychosocial Interventions   Environmental Support calm environment promoted;personal routine supported;rest periods encouraged

## 2019-04-19 ENCOUNTER — DOCUMENTATION (OUTPATIENT)
Dept: HEMATOLOGY/ONCOLOGY | Facility: HOSPITAL | Age: 84
End: 2019-04-19

## 2019-06-18 ENCOUNTER — TRANSCRIBE ORDERS (OUTPATIENT)
Dept: SCHEDULING | Age: 84
End: 2019-06-18

## 2019-08-12 ENCOUNTER — TRANSCRIBE ORDERS (OUTPATIENT)
Dept: SCHEDULING | Age: 84
End: 2019-08-12

## 2019-08-12 DIAGNOSIS — J44.9 CHRONIC OBSTRUCTIVE PULMONARY DISEASE (CMS/HCC): Primary | ICD-10-CM

## 2019-08-16 ENCOUNTER — HOSPITAL ENCOUNTER (OUTPATIENT)
Dept: PULMONOLOGY | Facility: HOSPITAL | Age: 84
Discharge: HOME | End: 2019-08-16
Attending: INTERNAL MEDICINE
Payer: COMMERCIAL

## 2019-08-16 DIAGNOSIS — J44.9 CHRONIC OBSTRUCTIVE PULMONARY DISEASE (CMS/HCC): ICD-10-CM

## 2019-08-16 DIAGNOSIS — J44.9 CHRONIC OBSTRUCTIVE PULMONARY DISEASE, UNSPECIFIED COPD TYPE (CMS/HCC): ICD-10-CM

## 2019-08-16 PROCEDURE — 94010 BREATHING CAPACITY TEST: CPT

## 2019-08-16 PROCEDURE — 94618 PULMONARY STRESS TESTING: CPT

## 2019-09-17 ENCOUNTER — TREATMENT (OUTPATIENT)
Dept: PULMONOLOGY | Facility: HOSPITAL | Age: 84
End: 2019-09-17
Attending: PHYSICAL MEDICINE & REHABILITATION
Payer: COMMERCIAL

## 2019-09-17 VITALS — BODY MASS INDEX: 30.73 KG/M2 | HEIGHT: 64 IN | WEIGHT: 180 LBS

## 2019-09-17 DIAGNOSIS — J44.9 CHRONIC OBSTRUCTIVE PULMONARY DISEASE, UNSPECIFIED COPD TYPE (CMS/HCC): Primary | ICD-10-CM

## 2019-09-17 PROCEDURE — G0424 PULMONARY REHAB W EXER: HCPCS

## 2019-09-17 RX ORDER — AZELASTINE HYDROCHLORIDE, FLUTICASONE PROPIONATE 137; 50 UG/1; UG/1
1 SPRAY, METERED NASAL 2 TIMES DAILY
COMMUNITY
End: 2019-12-23 | Stop reason: ALTCHOICE

## 2019-09-17 RX ORDER — DILTIAZEM HYDROCHLORIDE 120 MG/1
120 CAPSULE, COATED, EXTENDED RELEASE ORAL DAILY
COMMUNITY

## 2019-09-17 RX ORDER — ALPRAZOLAM 0.25 MG/1
0.25 TABLET ORAL AS NEEDED
COMMUNITY
End: 2019-12-23 | Stop reason: ALTCHOICE

## 2019-09-17 RX ORDER — ALBUTEROL SULFATE 90 UG/1
2 INHALANT RESPIRATORY (INHALATION) EVERY 4 HOURS PRN
COMMUNITY

## 2019-09-17 RX ORDER — CEFDINIR 300 MG/1
300 CAPSULE ORAL 2 TIMES DAILY
COMMUNITY
End: 2019-12-23 | Stop reason: ALTCHOICE

## 2019-09-17 RX ORDER — MINERAL OIL
180 ENEMA (ML) RECTAL DAILY
COMMUNITY

## 2019-09-17 NOTE — LETTER
RE: Zuhair Luong Jr  : 1935      Dear Dr. Montano  Thank you for referring your patient Zhuair Luong Jr to [facility] Outpatient Pulmonary Rehabilitation Program.    Zuhair Luong Jr has been evaluated and accepted into the program and will begin the program on 2019.     We will be sending you a summary of his progress in the program after he   graduates.       A patient-stated medication list has been reviewed and included with the attached patient's EMR. Please review and update if necessary and notify us of any changes.       Thank you again for your kind referral,    Sincerely,   Claire Giles, RRT, RPFT  [facility]          Current Outpatient Prescriptions:   •  albuterol 2.5 mg /3 mL (0.083 %) nebulizer solution, Take 2.5 mg by nebulization 3 (three) times a day as needed for wheezing., Disp: , Rfl:   •  albuterol HFA (VENTOLIN HFA) 90 mcg/actuation inhaler, Inhale 2 puffs as needed for wheezing., Disp: , Rfl:   •  ALPRAZolam (XANAX) 0.25 mg tablet, Take 0.25 mg by mouth as needed for anxiety., Disp: , Rfl:   •  atorvastatin (LIPITOR) 10 mg tablet, Take 10 mg by mouth daily., Disp: , Rfl:   •  azelastine-fluticasone (DYMISTA) 137-50 mcg/spray nasal spray, Administer 1 spray into each nostril 2 (two) times a day., Disp: , Rfl:   •  budesonide (PULMICORT) 0.5 mg/2 mL nebulizer solution, Take 0.5 mg by nebulization 2 (two) times a day. Rinse mouth with water after use to reduce aftertaste and incidence of candidiasis. Do not swallow., Disp: , Rfl:   •  cefdinir (OMNICEF) 300 mg capsule, Take 300 mg by mouth 2 (two) times a day., Disp: , Rfl:   •  dilTIAZem CD (CARDIZEM CD) 120 mg 24 hr capsule, Take 120 mg by mouth daily., Disp: , Rfl:   •  famotidine (PEPCID) 40 mg tablet, Take 40 mg by mouth 2 times daily., Disp: , Rfl:   •  fexofenadine (ALLEGRA) 180 mg tablet, Take 180 mg by mouth daily., Disp: , Rfl:   •  finasteride (PROSCAR) 5 mg tablet, Take 5 mg by  mouth daily., Disp: , Rfl:   •  montelukast (SINGULAIR) 10 mg tablet, Take by mouth nightly., Disp: , Rfl:   •  omeprazole (PriLOSEC) 20 mg capsule, Take 20 mg by mouth daily before breakfast., Disp: , Rfl:   •  tamsulosin (FLOMAX) 0.4 mg capsule, Take 0.4 mg by mouth 2 times daily., Disp: , Rfl:   •  tiotropium (SPIRIVA WITH HANDIHALER) 18 mcg per inhalation capsule, Place 18 mcg into inhaler and inhale daily., Disp: , Rfl:   •  amLODIPine (NORVASC) 10 mg tablet, Take 1 tablet (10 mg total) by mouth daily., Disp: 30 tablet, Rfl: 0  •  carvedilol (COREG) 12.5 mg tablet, Take 1 tablet (12.5 mg total) by mouth 2 (two) times a day with meals., Disp: 60 tablet, Rfl: 0  •  predniSONE (DELTASONE) 10 mg tablet, 40 mg (4 tablets) for 5 days, 3 tablets for 5 days, 2 tablets for 5 days, 1 tablet for 5 days, 5 mg (half tablet) for 5 days. (Patient not taking: Reported on 9/17/2019 ), Disp: 53 tablet, Rfl: 0

## 2019-09-17 NOTE — PROGRESS NOTES
Initial Pulmonary rehab session  6 minute walk was performed. All paperwork and forms reviewed and completed with patient present. All medications reviewed and updated in computer. Goals set and indiviual treatment plan completed with patient.   Patient then was oriented to all pieces of gym equipment which included, treadmill, bike , Nu-step, arm ube and hand weights. Warm-ups and cool downs reviewed and performed with the patient. COPD action plan given to him and reviewed. Also reviewed his disease, diaphragmatic breathing, pursed lip breathing and energy conservation especially when showering. Ed will start with his class on Wednesday 9/25 at 9:30 and will be exercising 3X/week.  Patient was here over 2 hours for today's session.

## 2019-09-18 ENCOUNTER — TRANSCRIBE ORDERS (OUTPATIENT)
Dept: SCHEDULING | Age: 84
End: 2019-09-18

## 2019-09-18 DIAGNOSIS — Z79.52 LONG TERM CURRENT USE OF SYSTEMIC STEROIDS: Primary | ICD-10-CM

## 2019-09-18 DIAGNOSIS — R19.00 INTRA-ABDOMINAL AND PELVIC SWELLING, MASS AND LUMP, UNSPECIFIED SITE: ICD-10-CM

## 2019-09-19 ENCOUNTER — HOSPITAL ENCOUNTER (OUTPATIENT)
Dept: RADIOLOGY | Facility: HOSPITAL | Age: 84
Discharge: HOME | End: 2019-09-19
Attending: INTERNAL MEDICINE
Payer: COMMERCIAL

## 2019-09-19 DIAGNOSIS — Z79.52 LONG TERM CURRENT USE OF SYSTEMIC STEROIDS: ICD-10-CM

## 2019-09-19 PROCEDURE — 77080 DXA BONE DENSITY AXIAL: CPT

## 2019-09-25 ENCOUNTER — TREATMENT (OUTPATIENT)
Dept: PULMONOLOGY | Facility: HOSPITAL | Age: 84
End: 2019-09-25
Attending: PHYSICAL MEDICINE & REHABILITATION
Payer: COMMERCIAL

## 2019-09-25 ENCOUNTER — APPOINTMENT (OUTPATIENT)
Dept: LAB | Facility: HOSPITAL | Age: 84
End: 2019-09-25
Attending: INTERNAL MEDICINE
Payer: COMMERCIAL

## 2019-09-25 ENCOUNTER — TRANSCRIBE ORDERS (OUTPATIENT)
Dept: NEPHROLOGY | Facility: CLINIC | Age: 84
End: 2019-09-25

## 2019-09-25 DIAGNOSIS — N17.9 ACUTE KIDNEY FAILURE (CMS/HCC): Primary | ICD-10-CM

## 2019-09-25 DIAGNOSIS — J44.9 CHRONIC OBSTRUCTIVE PULMONARY DISEASE, UNSPECIFIED COPD TYPE (CMS/HCC): Primary | ICD-10-CM

## 2019-09-25 DIAGNOSIS — N17.9 ACUTE KIDNEY FAILURE (CMS/HCC): ICD-10-CM

## 2019-09-25 LAB
ANION GAP SERPL CALC-SCNC: 9 MEQ/L (ref 3–15)
BUN SERPL-MCNC: 22 MG/DL (ref 8–20)
CALCIUM SERPL-MCNC: 9.2 MG/DL (ref 8.9–10.3)
CHLORIDE SERPL-SCNC: 104 MEQ/L (ref 98–109)
CO2 SERPL-SCNC: 26 MEQ/L (ref 22–32)
CREAT SERPL-MCNC: 1.4 MG/DL
GFR SERPL CREATININE-BSD FRML MDRD: 48.6 ML/MIN/1.73M*2
GLUCOSE SERPL-MCNC: 90 MG/DL (ref 70–99)
POTASSIUM SERPL-SCNC: 4.5 MEQ/L (ref 3.6–5.1)
SODIUM SERPL-SCNC: 139 MEQ/L (ref 136–144)

## 2019-09-25 PROCEDURE — G0424 PULMONARY REHAB W EXER: HCPCS

## 2019-09-25 PROCEDURE — 80048 BASIC METABOLIC PNL TOTAL CA: CPT

## 2019-09-25 PROCEDURE — 36415 COLL VENOUS BLD VENIPUNCTURE: CPT

## 2019-09-27 ENCOUNTER — TREATMENT (OUTPATIENT)
Dept: PULMONOLOGY | Facility: HOSPITAL | Age: 84
End: 2019-09-27
Attending: PHYSICAL MEDICINE & REHABILITATION
Payer: COMMERCIAL

## 2019-09-27 DIAGNOSIS — J44.9 CHRONIC OBSTRUCTIVE PULMONARY DISEASE, UNSPECIFIED COPD TYPE (CMS/HCC): Primary | ICD-10-CM

## 2019-09-27 PROCEDURE — G0424 PULMONARY REHAB W EXER: HCPCS

## 2019-09-30 ENCOUNTER — TREATMENT (OUTPATIENT)
Dept: PULMONOLOGY | Facility: HOSPITAL | Age: 84
End: 2019-09-30
Attending: PHYSICAL MEDICINE & REHABILITATION
Payer: COMMERCIAL

## 2019-09-30 DIAGNOSIS — J44.9 CHRONIC OBSTRUCTIVE PULMONARY DISEASE, UNSPECIFIED COPD TYPE (CMS/HCC): Primary | ICD-10-CM

## 2019-09-30 PROCEDURE — G0424 PULMONARY REHAB W EXER: HCPCS

## 2019-10-01 ENCOUNTER — APPOINTMENT (RX ONLY)
Dept: URBAN - METROPOLITAN AREA CLINIC 23 | Facility: CLINIC | Age: 84
Setting detail: DERMATOLOGY
End: 2019-10-01

## 2019-10-01 DIAGNOSIS — L82.1 OTHER SEBORRHEIC KERATOSIS: ICD-10-CM

## 2019-10-01 DIAGNOSIS — L57.0 ACTINIC KERATOSIS: ICD-10-CM

## 2019-10-01 DIAGNOSIS — L81.4 OTHER MELANIN HYPERPIGMENTATION: ICD-10-CM

## 2019-10-01 DIAGNOSIS — Z85.828 PERSONAL HISTORY OF OTHER MALIGNANT NEOPLASM OF SKIN: ICD-10-CM

## 2019-10-01 PROCEDURE — ? COUNSELING

## 2019-10-01 PROCEDURE — 99214 OFFICE O/P EST MOD 30 MIN: CPT | Mod: 25

## 2019-10-01 PROCEDURE — ? LIQUID NITROGEN

## 2019-10-01 PROCEDURE — 17004 DESTROY PREMAL LESIONS 15/>: CPT

## 2019-10-01 ASSESSMENT — LOCATION SIMPLE DESCRIPTION DERM
LOCATION SIMPLE: RIGHT FOREARM
LOCATION SIMPLE: RIGHT CHEEK
LOCATION SIMPLE: RIGHT UPPER BACK
LOCATION SIMPLE: LEFT TEMPLE
LOCATION SIMPLE: LEFT SHOULDER
LOCATION SIMPLE: LEFT EYEBROW
LOCATION SIMPLE: RIGHT HAND
LOCATION SIMPLE: LEFT HAND
LOCATION SIMPLE: LEFT FOREARM

## 2019-10-01 ASSESSMENT — LOCATION ZONE DERM
LOCATION ZONE: HAND
LOCATION ZONE: FACE
LOCATION ZONE: ARM
LOCATION ZONE: TRUNK

## 2019-10-01 ASSESSMENT — LOCATION DETAILED DESCRIPTION DERM
LOCATION DETAILED: LEFT LATERAL EYEBROW
LOCATION DETAILED: LEFT PROXIMAL DORSAL FOREARM
LOCATION DETAILED: RIGHT ULNAR DORSAL HAND
LOCATION DETAILED: LEFT DISTAL DORSAL FOREARM
LOCATION DETAILED: LEFT INFERIOR TEMPLE
LOCATION DETAILED: LEFT ANTERIOR SHOULDER
LOCATION DETAILED: RIGHT SUPERIOR UPPER BACK
LOCATION DETAILED: RIGHT INFERIOR CENTRAL MALAR CHEEK
LOCATION DETAILED: RIGHT PROXIMAL DORSAL FOREARM
LOCATION DETAILED: RIGHT INFERIOR LATERAL MALAR CHEEK
LOCATION DETAILED: RIGHT SUPERIOR LATERAL MALAR CHEEK
LOCATION DETAILED: LEFT ULNAR DORSAL HAND
LOCATION DETAILED: RIGHT RADIAL DORSAL HAND
LOCATION DETAILED: LEFT RADIAL DORSAL HAND

## 2019-10-03 ENCOUNTER — HOSPITAL ENCOUNTER (OUTPATIENT)
Dept: RADIOLOGY | Facility: HOSPITAL | Age: 84
Discharge: HOME | End: 2019-10-03
Attending: INTERNAL MEDICINE
Payer: COMMERCIAL

## 2019-10-03 DIAGNOSIS — R19.00 INTRA-ABDOMINAL AND PELVIC SWELLING, MASS AND LUMP, UNSPECIFIED SITE: ICD-10-CM

## 2019-10-03 PROCEDURE — 76700 US EXAM ABDOM COMPLETE: CPT

## 2019-10-04 ENCOUNTER — TREATMENT (OUTPATIENT)
Dept: PULMONOLOGY | Facility: HOSPITAL | Age: 84
End: 2019-10-04
Attending: PHYSICAL MEDICINE & REHABILITATION
Payer: COMMERCIAL

## 2019-10-04 DIAGNOSIS — J44.9 CHRONIC OBSTRUCTIVE PULMONARY DISEASE, UNSPECIFIED COPD TYPE (CMS/HCC): Primary | ICD-10-CM

## 2019-10-04 PROCEDURE — G0424 PULMONARY REHAB W EXER: HCPCS

## 2019-10-09 ENCOUNTER — APPOINTMENT (OUTPATIENT)
Dept: LAB | Facility: HOSPITAL | Age: 84
End: 2019-10-09
Attending: INTERNAL MEDICINE
Payer: COMMERCIAL

## 2019-10-09 ENCOUNTER — TREATMENT (OUTPATIENT)
Dept: PULMONOLOGY | Facility: HOSPITAL | Age: 84
End: 2019-10-09
Attending: PHYSICAL MEDICINE & REHABILITATION
Payer: COMMERCIAL

## 2019-10-09 DIAGNOSIS — J44.9 CHRONIC OBSTRUCTIVE PULMONARY DISEASE, UNSPECIFIED COPD TYPE (CMS/HCC): Primary | ICD-10-CM

## 2019-10-09 DIAGNOSIS — N17.9 ACUTE KIDNEY FAILURE (CMS/HCC): ICD-10-CM

## 2019-10-09 LAB
ANION GAP SERPL CALC-SCNC: 11 MEQ/L (ref 3–15)
BUN SERPL-MCNC: 22 MG/DL (ref 8–20)
CALCIUM SERPL-MCNC: 9.4 MG/DL (ref 8.9–10.3)
CHLORIDE SERPL-SCNC: 101 MEQ/L (ref 98–109)
CO2 SERPL-SCNC: 26 MEQ/L (ref 22–32)
CREAT SERPL-MCNC: 1.5 MG/DL
GFR SERPL CREATININE-BSD FRML MDRD: 44.9 ML/MIN/1.73M*2
GLUCOSE SERPL-MCNC: 114 MG/DL (ref 70–99)
POTASSIUM SERPL-SCNC: 4.5 MEQ/L (ref 3.6–5.1)
SODIUM SERPL-SCNC: 138 MEQ/L (ref 136–144)

## 2019-10-09 PROCEDURE — G0424 PULMONARY REHAB W EXER: HCPCS

## 2019-10-09 PROCEDURE — 36415 COLL VENOUS BLD VENIPUNCTURE: CPT

## 2019-10-09 PROCEDURE — 80048 BASIC METABOLIC PNL TOTAL CA: CPT

## 2019-10-14 ENCOUNTER — TREATMENT (OUTPATIENT)
Dept: PULMONOLOGY | Facility: HOSPITAL | Age: 84
End: 2019-10-14
Attending: PHYSICAL MEDICINE & REHABILITATION
Payer: COMMERCIAL

## 2019-10-14 DIAGNOSIS — J44.9 CHRONIC OBSTRUCTIVE PULMONARY DISEASE, UNSPECIFIED COPD TYPE (CMS/HCC): Primary | ICD-10-CM

## 2019-10-14 PROCEDURE — G0424 PULMONARY REHAB W EXER: HCPCS

## 2019-10-16 ENCOUNTER — TREATMENT (OUTPATIENT)
Dept: PULMONOLOGY | Facility: HOSPITAL | Age: 84
End: 2019-10-16
Attending: PHYSICAL MEDICINE & REHABILITATION
Payer: COMMERCIAL

## 2019-10-16 DIAGNOSIS — J44.9 CHRONIC OBSTRUCTIVE PULMONARY DISEASE, UNSPECIFIED COPD TYPE (CMS/HCC): Primary | ICD-10-CM

## 2019-10-16 PROCEDURE — G0424 PULMONARY REHAB W EXER: HCPCS

## 2019-10-21 ENCOUNTER — TREATMENT (OUTPATIENT)
Dept: PULMONOLOGY | Facility: HOSPITAL | Age: 84
End: 2019-10-21
Attending: PHYSICAL MEDICINE & REHABILITATION
Payer: COMMERCIAL

## 2019-10-21 DIAGNOSIS — J44.9 CHRONIC OBSTRUCTIVE PULMONARY DISEASE, UNSPECIFIED COPD TYPE (CMS/HCC): Primary | ICD-10-CM

## 2019-10-21 PROCEDURE — G0424 PULMONARY REHAB W EXER: HCPCS

## 2019-10-25 ENCOUNTER — APPOINTMENT (OUTPATIENT)
Dept: LAB | Facility: HOSPITAL | Age: 84
End: 2019-10-25
Attending: INTERNAL MEDICINE
Payer: COMMERCIAL

## 2019-10-25 ENCOUNTER — TREATMENT (OUTPATIENT)
Dept: PULMONOLOGY | Facility: HOSPITAL | Age: 84
End: 2019-10-25
Attending: PHYSICAL MEDICINE & REHABILITATION
Payer: COMMERCIAL

## 2019-10-25 ENCOUNTER — TRANSCRIBE ORDERS (OUTPATIENT)
Dept: LAB | Facility: HOSPITAL | Age: 84
End: 2019-10-25

## 2019-10-25 DIAGNOSIS — N17.9 ACUTE KIDNEY FAILURE, UNSPECIFIED (CMS/HCC): ICD-10-CM

## 2019-10-25 DIAGNOSIS — N17.9 ACUTE KIDNEY FAILURE (CMS/HCC): ICD-10-CM

## 2019-10-25 DIAGNOSIS — N17.9 ACUTE KIDNEY FAILURE, UNSPECIFIED (CMS/HCC): Primary | ICD-10-CM

## 2019-10-25 DIAGNOSIS — J44.9 CHRONIC OBSTRUCTIVE PULMONARY DISEASE, UNSPECIFIED COPD TYPE (CMS/HCC): Primary | ICD-10-CM

## 2019-10-25 LAB
ALBUMIN/CREAT UR: 12.1 UG/MG
ANION GAP SERPL CALC-SCNC: 9 MEQ/L (ref 3–15)
BACTERIA URNS QL MICRO: NORMAL /HPF
BILIRUB UR QL STRIP.AUTO: NEGATIVE MG/DL
BUN SERPL-MCNC: 27 MG/DL (ref 8–20)
CALCIUM SERPL-MCNC: 9.1 MG/DL (ref 8.9–10.3)
CHLORIDE SERPL-SCNC: 106 MEQ/L (ref 98–109)
CLARITY UR REFRACT.AUTO: CLEAR
CO2 SERPL-SCNC: 27 MEQ/L (ref 22–32)
COLOR UR AUTO: YELLOW
CREAT SERPL-MCNC: 1.4 MG/DL
CREAT UR-MCNC: 102 MG/DL
GFR SERPL CREATININE-BSD FRML MDRD: 48.5 ML/MIN/1.73M*2
GLUCOSE SERPL-MCNC: 99 MG/DL (ref 70–99)
GLUCOSE UR STRIP.AUTO-MCNC: NEGATIVE MG/DL
HGB UR QL STRIP.AUTO: NEGATIVE
HYALINE CASTS #/AREA URNS LPF: NORMAL /LPF
KETONES UR STRIP.AUTO-MCNC: NEGATIVE MG/DL
LEUKOCYTE ESTERASE UR QL STRIP.AUTO: NEGATIVE
MICROALBUMIN UR-MCNC: 12.3 MG/L
NITRITE UR QL STRIP.AUTO: NEGATIVE
PH UR STRIP.AUTO: 6 [PH]
POTASSIUM SERPL-SCNC: 4.7 MEQ/L (ref 3.6–5.1)
PROT UR QL STRIP.AUTO: NEGATIVE
RBC #/AREA URNS HPF: NORMAL /HPF
SODIUM SERPL-SCNC: 142 MEQ/L (ref 136–144)
SP GR UR REFRACT.AUTO: 1.02
SQUAMOUS URNS QL MICRO: NORMAL /HPF
UROBILINOGEN UR STRIP-ACNC: 0.2 EU/DL
WBC #/AREA URNS HPF: NORMAL /HPF

## 2019-10-25 PROCEDURE — 82570 ASSAY OF URINE CREATININE: CPT

## 2019-10-25 PROCEDURE — 81001 URINALYSIS AUTO W/SCOPE: CPT

## 2019-10-25 PROCEDURE — G0424 PULMONARY REHAB W EXER: HCPCS

## 2019-10-25 PROCEDURE — 36415 COLL VENOUS BLD VENIPUNCTURE: CPT

## 2019-10-25 PROCEDURE — 80048 BASIC METABOLIC PNL TOTAL CA: CPT

## 2019-10-30 ENCOUNTER — TREATMENT (OUTPATIENT)
Dept: PULMONOLOGY | Facility: HOSPITAL | Age: 84
End: 2019-10-30
Attending: PHYSICAL MEDICINE & REHABILITATION
Payer: COMMERCIAL

## 2019-10-30 DIAGNOSIS — J44.9 CHRONIC OBSTRUCTIVE PULMONARY DISEASE, UNSPECIFIED COPD TYPE (CMS/HCC): Primary | ICD-10-CM

## 2019-10-30 PROCEDURE — G0424 PULMONARY REHAB W EXER: HCPCS

## 2019-11-01 ENCOUNTER — TREATMENT (OUTPATIENT)
Dept: PULMONOLOGY | Facility: HOSPITAL | Age: 84
End: 2019-11-01
Attending: PHYSICAL MEDICINE & REHABILITATION
Payer: COMMERCIAL

## 2019-11-01 DIAGNOSIS — J44.9 CHRONIC OBSTRUCTIVE PULMONARY DISEASE, UNSPECIFIED COPD TYPE (CMS/HCC): Primary | ICD-10-CM

## 2019-11-01 PROCEDURE — G0424 PULMONARY REHAB W EXER: HCPCS

## 2019-11-04 ENCOUNTER — TREATMENT (OUTPATIENT)
Dept: PULMONOLOGY | Facility: HOSPITAL | Age: 84
End: 2019-11-04
Attending: PHYSICAL MEDICINE & REHABILITATION
Payer: COMMERCIAL

## 2019-11-04 DIAGNOSIS — J44.9 CHRONIC OBSTRUCTIVE PULMONARY DISEASE, UNSPECIFIED COPD TYPE (CMS/HCC): Primary | ICD-10-CM

## 2019-11-04 PROCEDURE — G0424 PULMONARY REHAB W EXER: HCPCS

## 2019-11-12 NOTE — PROGRESS NOTES
60 Day Assessment  Ed has been working hard this month on his pursed lip breathing with exertion. Also instructed on diaphragmatic breathing. Instructed Ed to practice this at home especially before he starts his day and at the end of his day. Again spoke to him about healthier food choices and portion control. Still getting frequent exacerbations. Hand and equipment hygiene discussed with him. Encourage the use of hand . Out this week due to recent lung infection. Will continue to work with Ed when he comes back.

## 2019-11-20 ENCOUNTER — TREATMENT (OUTPATIENT)
Dept: PULMONOLOGY | Facility: HOSPITAL | Age: 84
End: 2019-11-20
Attending: PHYSICAL MEDICINE & REHABILITATION
Payer: COMMERCIAL

## 2019-11-20 ENCOUNTER — APPOINTMENT (OUTPATIENT)
Dept: LAB | Facility: HOSPITAL | Age: 84
End: 2019-11-20
Attending: INTERNAL MEDICINE
Payer: COMMERCIAL

## 2019-11-20 DIAGNOSIS — N17.9 ACUTE KIDNEY FAILURE (CMS/HCC): ICD-10-CM

## 2019-11-20 DIAGNOSIS — J44.9 CHRONIC OBSTRUCTIVE PULMONARY DISEASE, UNSPECIFIED COPD TYPE (CMS/HCC): Primary | ICD-10-CM

## 2019-11-20 LAB
ANION GAP SERPL CALC-SCNC: 9 MEQ/L (ref 3–15)
BUN SERPL-MCNC: 30 MG/DL (ref 8–20)
CALCIUM SERPL-MCNC: 9.1 MG/DL (ref 8.9–10.3)
CHLORIDE SERPL-SCNC: 99 MEQ/L (ref 98–109)
CO2 SERPL-SCNC: 28 MEQ/L (ref 22–32)
CREAT SERPL-MCNC: 1.4 MG/DL
GFR SERPL CREATININE-BSD FRML MDRD: 48.5 ML/MIN/1.73M*2
GLUCOSE SERPL-MCNC: 153 MG/DL (ref 70–99)
POTASSIUM SERPL-SCNC: 5.2 MEQ/L (ref 3.6–5.1)
SODIUM SERPL-SCNC: 136 MEQ/L (ref 136–144)

## 2019-11-20 PROCEDURE — 80048 BASIC METABOLIC PNL TOTAL CA: CPT

## 2019-11-20 PROCEDURE — 36415 COLL VENOUS BLD VENIPUNCTURE: CPT

## 2019-11-20 PROCEDURE — G0424 PULMONARY REHAB W EXER: HCPCS

## 2019-11-25 ENCOUNTER — TREATMENT (OUTPATIENT)
Dept: PULMONOLOGY | Facility: HOSPITAL | Age: 84
End: 2019-11-25
Attending: PHYSICAL MEDICINE & REHABILITATION
Payer: COMMERCIAL

## 2019-11-25 DIAGNOSIS — J44.9 CHRONIC OBSTRUCTIVE PULMONARY DISEASE, UNSPECIFIED COPD TYPE (CMS/HCC): Primary | ICD-10-CM

## 2019-11-25 PROCEDURE — G0424 PULMONARY REHAB W EXER: HCPCS

## 2019-12-09 NOTE — PROGRESS NOTES
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 He came in today after his visit with Dr. Montano and said that everything was good. He has only a few more education requirements to complete. He said that he would be in the end of the week.     Dr Valenzuela  Is supervising Physician

## 2019-12-12 ENCOUNTER — TRANSCRIBE ORDERS (OUTPATIENT)
Dept: SCHEDULING | Age: 84
End: 2019-12-12

## 2019-12-12 DIAGNOSIS — R91.8 OTHER NONSPECIFIC ABNORMAL FINDING OF LUNG FIELD: Primary | ICD-10-CM

## 2019-12-16 ENCOUNTER — TREATMENT (OUTPATIENT)
Dept: PULMONOLOGY | Facility: HOSPITAL | Age: 84
End: 2019-12-16
Attending: PHYSICAL MEDICINE & REHABILITATION
Payer: COMMERCIAL

## 2019-12-16 DIAGNOSIS — J44.9 CHRONIC OBSTRUCTIVE PULMONARY DISEASE, UNSPECIFIED COPD TYPE (CMS/HCC): Primary | ICD-10-CM

## 2019-12-16 PROCEDURE — G0424 PULMONARY REHAB W EXER: HCPCS

## 2019-12-23 ENCOUNTER — APPOINTMENT (EMERGENCY)
Dept: RADIOLOGY | Facility: HOSPITAL | Age: 84
DRG: 190 | End: 2019-12-23
Attending: EMERGENCY MEDICINE
Payer: COMMERCIAL

## 2019-12-23 ENCOUNTER — HOSPITAL ENCOUNTER (INPATIENT)
Facility: HOSPITAL | Age: 84
LOS: 13 days | Discharge: HOME HEALTH | DRG: 190 | End: 2020-01-05
Attending: EMERGENCY MEDICINE | Admitting: INTERNAL MEDICINE
Payer: COMMERCIAL

## 2019-12-23 DIAGNOSIS — J44.1 COPD EXACERBATION (CMS/HCC): Primary | ICD-10-CM

## 2019-12-23 PROBLEM — Z85.46 HISTORY OF PROSTATE CANCER: Status: ACTIVE | Noted: 2019-12-23

## 2019-12-23 PROBLEM — E78.5 HYPERLIPIDEMIA: Status: ACTIVE | Noted: 2019-12-23

## 2019-12-23 PROBLEM — K21.9 GERD (GASTROESOPHAGEAL REFLUX DISEASE): Status: ACTIVE | Noted: 2019-12-23

## 2019-12-23 PROBLEM — N40.0 BPH (BENIGN PROSTATIC HYPERPLASIA): Status: ACTIVE | Noted: 2019-12-23

## 2019-12-23 LAB
ANION GAP SERPL CALC-SCNC: 12 MEQ/L (ref 3–15)
ATRIAL RATE: 105
BASE EXCESS BLDV CALC-SCNC: 3 MEQ/L
BASOPHILS # BLD: 0.04 K/UL (ref 0.01–0.1)
BASOPHILS NFR BLD: 0.3 %
BUN SERPL-MCNC: 29 MG/DL (ref 8–20)
CALCIUM SERPL-MCNC: 8.8 MG/DL (ref 8.9–10.3)
CHLORIDE SERPL-SCNC: 98 MEQ/L (ref 98–109)
CO2 BLDV-SCNC: 31.5 MEQ/L (ref 22–32)
CO2 SERPL-SCNC: 27 MEQ/L (ref 22–32)
CREAT SERPL-MCNC: 1.3 MG/DL
DIFFERENTIAL METHOD BLD: ABNORMAL
EOSINOPHIL # BLD: 0.03 K/UL (ref 0.04–0.54)
EOSINOPHIL NFR BLD: 0.2 %
ERYTHROCYTE [DISTWIDTH] IN BLOOD BY AUTOMATED COUNT: 12.9 % (ref 11.6–14.4)
FIO2 ON VENT: 40 %
FLUAV RNA SPEC QL NAA+PROBE: NEGATIVE
FLUBV RNA SPEC QL NAA+PROBE: NEGATIVE
GFR SERPL CREATININE-BSD FRML MDRD: 52.8 ML/MIN/1.73M*2
GLUCOSE SERPL-MCNC: 145 MG/DL (ref 70–99)
HCO3 BLDV-SCNC: 29.9 MEQ/L (ref 21–28)
HCT VFR BLDCO AUTO: 50.7 % (ref 40.1–51)
HGB BLD-MCNC: 16.6 G/DL
IMM GRANULOCYTES # BLD AUTO: 0.08 K/UL (ref 0–0.08)
IMM GRANULOCYTES NFR BLD AUTO: 0.6 %
INHALED O2 CONCENTRATION: ABNORMAL %
LYMPHOCYTES # BLD: 0.74 K/UL (ref 1.2–3.5)
LYMPHOCYTES NFR BLD: 5.8 %
MCH RBC QN AUTO: 31.6 PG (ref 28–33.2)
MCHC RBC AUTO-ENTMCNC: 32.7 G/DL (ref 32.2–36.5)
MCV RBC AUTO: 96.4 FL (ref 83–98)
MONOCYTES # BLD: 0.7 K/UL (ref 0.3–1)
MONOCYTES NFR BLD: 5.5 %
NEUTROPHILS # BLD: 11.16 K/UL (ref 1.7–7)
NEUTS SEG NFR BLD: 87.6 %
NRBC BLD-RTO: 0 %
P AXIS: 77
PCO2 BLDV: 53 MM HG (ref 41–51)
PDW BLD AUTO: 9.5 FL (ref 9.4–12.4)
PH BLDV: 7.36 [PH] (ref 7.32–7.42)
PLATELET # BLD AUTO: 275 K/UL
PO2 BLDV: 40 MM HG (ref 25–40)
POTASSIUM SERPL-SCNC: 4.4 MEQ/L (ref 3.6–5.1)
PR INTERVAL: 140
QRS DURATION: 74
QT INTERVAL: 338
QTC CALCULATION(BAZETT): 446
R AXIS: -47
RBC # BLD AUTO: 5.26 M/UL (ref 4.5–5.8)
RSV RNA SPEC QL NAA+PROBE: NEGATIVE
SODIUM SERPL-SCNC: 137 MEQ/L (ref 136–144)
T WAVE AXIS: 77
TROPONIN I SERPL-MCNC: <0.03 NG/ML
VENTRICULAR RATE: 105
WBC # BLD AUTO: 12.75 K/UL

## 2019-12-23 PROCEDURE — 96374 THER/PROPH/DIAG INJ IV PUSH: CPT

## 2019-12-23 PROCEDURE — 99222 1ST HOSP IP/OBS MODERATE 55: CPT | Performed by: INTERNAL MEDICINE

## 2019-12-23 PROCEDURE — 80048 BASIC METABOLIC PNL TOTAL CA: CPT | Performed by: EMERGENCY MEDICINE

## 2019-12-23 PROCEDURE — 5A09357 ASSISTANCE WITH RESPIRATORY VENTILATION, LESS THAN 24 CONSECUTIVE HOURS, CONTINUOUS POSITIVE AIRWAY PRESSURE: ICD-10-PCS | Performed by: HOSPITALIST

## 2019-12-23 PROCEDURE — 93005 ELECTROCARDIOGRAM TRACING: CPT | Performed by: EMERGENCY MEDICINE

## 2019-12-23 PROCEDURE — 25000000 HC PHARMACY GENERAL

## 2019-12-23 PROCEDURE — 99285 EMERGENCY DEPT VISIT HI MDM: CPT | Mod: 25

## 2019-12-23 PROCEDURE — 85025 COMPLETE CBC W/AUTO DIFF WBC: CPT | Performed by: EMERGENCY MEDICINE

## 2019-12-23 PROCEDURE — 82803 BLOOD GASES ANY COMBINATION: CPT | Performed by: PHYSICIAN ASSISTANT

## 2019-12-23 PROCEDURE — 87631 RESP VIRUS 3-5 TARGETS: CPT | Performed by: PHYSICIAN ASSISTANT

## 2019-12-23 PROCEDURE — 25000000 HC PHARMACY GENERAL: Performed by: INTERNAL MEDICINE

## 2019-12-23 PROCEDURE — 21400000 HC ROOM AND CARE CCU/INTERMEDIATE

## 2019-12-23 PROCEDURE — 93010 ELECTROCARDIOGRAM REPORT: CPT | Performed by: INTERNAL MEDICINE

## 2019-12-23 PROCEDURE — 63700000 HC SELF-ADMINISTRABLE DRUG: Performed by: INTERNAL MEDICINE

## 2019-12-23 PROCEDURE — 84484 ASSAY OF TROPONIN QUANT: CPT | Performed by: EMERGENCY MEDICINE

## 2019-12-23 PROCEDURE — 63600000 HC DRUGS/DETAIL CODE: Performed by: INTERNAL MEDICINE

## 2019-12-23 PROCEDURE — 94660 CPAP INITIATION&MGMT: CPT

## 2019-12-23 PROCEDURE — 71045 X-RAY EXAM CHEST 1 VIEW: CPT

## 2019-12-23 PROCEDURE — 36415 COLL VENOUS BLD VENIPUNCTURE: CPT | Performed by: EMERGENCY MEDICINE

## 2019-12-23 PROCEDURE — 63600000 HC DRUGS/DETAIL CODE: Performed by: PHYSICIAN ASSISTANT

## 2019-12-23 RX ORDER — FINASTERIDE 5 MG/1
5 TABLET, FILM COATED ORAL DAILY
Status: DISCONTINUED | OUTPATIENT
Start: 2019-12-23 | End: 2020-01-05 | Stop reason: HOSPADM

## 2019-12-23 RX ORDER — ATORVASTATIN CALCIUM 10 MG/1
10 TABLET, FILM COATED ORAL DAILY
Status: DISCONTINUED | OUTPATIENT
Start: 2019-12-23 | End: 2020-01-05 | Stop reason: HOSPADM

## 2019-12-23 RX ORDER — IBUPROFEN 200 MG
16-32 TABLET ORAL AS NEEDED
Status: DISCONTINUED | OUTPATIENT
Start: 2019-12-23 | End: 2020-01-05 | Stop reason: HOSPADM

## 2019-12-23 RX ORDER — CETIRIZINE HYDROCHLORIDE 10 MG/1
10 TABLET ORAL NIGHTLY
Status: DISCONTINUED | OUTPATIENT
Start: 2019-12-23 | End: 2020-01-05 | Stop reason: HOSPADM

## 2019-12-23 RX ORDER — FAMOTIDINE 20 MG/1
40 TABLET, FILM COATED ORAL DAILY
Status: DISCONTINUED | OUTPATIENT
Start: 2019-12-23 | End: 2019-12-23 | Stop reason: DRUGHIGH

## 2019-12-23 RX ORDER — DEXTROSE 50 % IN WATER (D50W) INTRAVENOUS SYRINGE
25 AS NEEDED
Status: DISCONTINUED | OUTPATIENT
Start: 2019-12-23 | End: 2020-01-05 | Stop reason: HOSPADM

## 2019-12-23 RX ORDER — BUDESONIDE 0.5 MG/2ML
0.5 INHALANT ORAL
Status: DISCONTINUED | OUTPATIENT
Start: 2019-12-23 | End: 2020-01-05 | Stop reason: HOSPADM

## 2019-12-23 RX ORDER — ESCITALOPRAM OXALATE 10 MG/1
10 TABLET ORAL DAILY
Status: DISCONTINUED | OUTPATIENT
Start: 2019-12-23 | End: 2020-01-05 | Stop reason: HOSPADM

## 2019-12-23 RX ORDER — LORAZEPAM 0.5 MG/1
0.5 TABLET ORAL DAILY PRN
COMMUNITY
Start: 2019-06-03

## 2019-12-23 RX ORDER — FAMOTIDINE 20 MG/1
20 TABLET, FILM COATED ORAL DAILY
Status: DISCONTINUED | OUTPATIENT
Start: 2019-12-23 | End: 2020-01-05 | Stop reason: HOSPADM

## 2019-12-23 RX ORDER — DEXTROSE 40 %
15-30 GEL (GRAM) ORAL AS NEEDED
Status: DISCONTINUED | OUTPATIENT
Start: 2019-12-23 | End: 2020-01-05 | Stop reason: HOSPADM

## 2019-12-23 RX ORDER — AMLODIPINE BESYLATE 5 MG/1
5 TABLET ORAL DAILY
COMMUNITY

## 2019-12-23 RX ORDER — CARVEDILOL 3.12 MG/1
3.12 TABLET ORAL 2 TIMES DAILY WITH MEALS
Status: DISCONTINUED | OUTPATIENT
Start: 2019-12-23 | End: 2020-01-05 | Stop reason: HOSPADM

## 2019-12-23 RX ORDER — TAMSULOSIN HYDROCHLORIDE 0.4 MG/1
0.4 CAPSULE ORAL DAILY
Status: DISCONTINUED | OUTPATIENT
Start: 2019-12-23 | End: 2020-01-05 | Stop reason: HOSPADM

## 2019-12-23 RX ORDER — MONTELUKAST SODIUM 10 MG/1
10 TABLET ORAL NIGHTLY
Status: DISCONTINUED | OUTPATIENT
Start: 2019-12-23 | End: 2020-01-05 | Stop reason: HOSPADM

## 2019-12-23 RX ORDER — CARVEDILOL 3.12 MG/1
3.12 TABLET ORAL 2 TIMES DAILY WITH MEALS
COMMUNITY

## 2019-12-23 RX ORDER — FORMOTEROL FUMARATE DIHYDRATE 20 UG/2ML
2 SOLUTION RESPIRATORY (INHALATION) 2 TIMES DAILY
Refills: 11 | COMMUNITY
Start: 2019-11-18 | End: 2019-12-23 | Stop reason: SDUPTHER

## 2019-12-23 RX ORDER — ESCITALOPRAM OXALATE 10 MG/1
10 TABLET ORAL DAILY
COMMUNITY
Start: 2019-05-17

## 2019-12-23 RX ORDER — AMLODIPINE BESYLATE 5 MG/1
5 TABLET ORAL DAILY
Status: DISCONTINUED | OUTPATIENT
Start: 2019-12-23 | End: 2019-12-25

## 2019-12-23 RX ORDER — IPRATROPIUM BROMIDE AND ALBUTEROL SULFATE 2.5; .5 MG/3ML; MG/3ML
SOLUTION RESPIRATORY (INHALATION)
Status: COMPLETED
Start: 2019-12-23 | End: 2019-12-23

## 2019-12-23 RX ORDER — ALENDRONATE SODIUM 70 MG/1
70 TABLET ORAL WEEKLY
COMMUNITY
Start: 2019-09-20

## 2019-12-23 RX ORDER — FORMOTEROL FUMARATE DIHYDRATE 20 UG/2ML
20 SOLUTION RESPIRATORY (INHALATION)
COMMUNITY

## 2019-12-23 RX ORDER — FORMOTEROL FUMARATE DIHYDRATE 20 UG/2ML
20 SOLUTION RESPIRATORY (INHALATION)
Status: DISCONTINUED | OUTPATIENT
Start: 2019-12-23 | End: 2020-01-05 | Stop reason: HOSPADM

## 2019-12-23 RX ORDER — IPRATROPIUM BROMIDE 0.5 MG/2.5ML
0.5 SOLUTION RESPIRATORY (INHALATION) EVERY 6 HOURS
Status: DISCONTINUED | OUTPATIENT
Start: 2019-12-23 | End: 2019-12-26

## 2019-12-23 RX ORDER — HEPARIN SODIUM 5000 [USP'U]/ML
5000 INJECTION, SOLUTION INTRAVENOUS; SUBCUTANEOUS EVERY 8 HOURS
Status: DISCONTINUED | OUTPATIENT
Start: 2019-12-23 | End: 2020-01-02

## 2019-12-23 RX ORDER — PANTOPRAZOLE SODIUM 20 MG/1
20 TABLET, DELAYED RELEASE ORAL DAILY
Status: DISCONTINUED | OUTPATIENT
Start: 2019-12-23 | End: 2020-01-05 | Stop reason: HOSPADM

## 2019-12-23 RX ADMIN — HEPARIN SODIUM 5000 UNITS: 5000 INJECTION, SOLUTION INTRAVENOUS; SUBCUTANEOUS at 16:09

## 2019-12-23 RX ADMIN — FORMOTEROL FUMARATE DIHYDRATE 20 MCG: 20 SOLUTION RESPIRATORY (INHALATION) at 18:59

## 2019-12-23 RX ADMIN — ESCITALOPRAM OXALATE 10 MG: 10 TABLET, FILM COATED ORAL at 16:03

## 2019-12-23 RX ADMIN — ATORVASTATIN CALCIUM 10 MG: 10 TABLET, FILM COATED ORAL at 16:03

## 2019-12-23 RX ADMIN — IPRATROPIUM BROMIDE 0.5 MG: 0.5 SOLUTION RESPIRATORY (INHALATION) at 22:54

## 2019-12-23 RX ADMIN — TAMSULOSIN HYDROCHLORIDE 0.4 MG: 0.4 CAPSULE ORAL at 16:03

## 2019-12-23 RX ADMIN — CETIRIZINE HYDROCHLORIDE 10 MG: 10 TABLET, FILM COATED ORAL at 20:19

## 2019-12-23 RX ADMIN — AMLODIPINE BESYLATE 5 MG: 5 TABLET ORAL at 16:03

## 2019-12-23 RX ADMIN — FAMOTIDINE 20 MG: 20 TABLET ORAL at 16:03

## 2019-12-23 RX ADMIN — IPRATROPIUM BROMIDE 0.5 MG: 0.5 SOLUTION RESPIRATORY (INHALATION) at 16:02

## 2019-12-23 RX ADMIN — MONTELUKAST 10 MG: 10 TABLET, FILM COATED ORAL at 20:17

## 2019-12-23 RX ADMIN — METHYLPREDNISOLONE SODIUM SUCCINATE 40 MG: 40 INJECTION, POWDER, FOR SOLUTION INTRAMUSCULAR; INTRAVENOUS at 20:18

## 2019-12-23 RX ADMIN — METHYLPREDNISOLONE SODIUM SUCCINATE 60 MG: 125 INJECTION, POWDER, FOR SOLUTION INTRAMUSCULAR; INTRAVENOUS at 10:09

## 2019-12-23 RX ADMIN — FINASTERIDE 5 MG: 5 TABLET, FILM COATED ORAL at 16:03

## 2019-12-23 RX ADMIN — PANTOPRAZOLE SODIUM 20 MG: 20 TABLET, DELAYED RELEASE ORAL at 16:04

## 2019-12-23 RX ADMIN — IPRATROPIUM BROMIDE 0.5 MG: 0.5 SOLUTION RESPIRATORY (INHALATION) at 14:00

## 2019-12-23 RX ADMIN — BUDESONIDE 0.5 MG: 0.5 INHALANT ORAL at 17:04

## 2019-12-23 RX ADMIN — CARVEDILOL 3.12 MG: 3.12 TABLET, FILM COATED ORAL at 16:03

## 2019-12-23 RX ADMIN — IPRATROPIUM BROMIDE AND ALBUTEROL SULFATE 3 ML: 2.5; .5 SOLUTION RESPIRATORY (INHALATION) at 09:57

## 2019-12-23 SDOH — HEALTH STABILITY: MENTAL HEALTH: HOW OFTEN DO YOU HAVE A DRINK CONTAINING ALCOHOL?: NEVER

## 2019-12-23 SDOH — HEALTH STABILITY: MENTAL HEALTH: HOW OFTEN DO YOU HAVE SIX OR MORE DRINKS ON ONE OCCASION?: NEVER

## 2019-12-23 ASSESSMENT — COGNITIVE AND FUNCTIONAL STATUS - GENERAL
DRESSING REGULAR UPPER BODY CLOTHING: 3 - A LITTLE
STANDING UP FROM CHAIR USING ARMS: 3 - A LITTLE
EATING MEALS: 3 - A LITTLE
HELP NEEDED FOR PERSONAL GROOMING: 3 - A LITTLE
TOILETING: 3 - A LITTLE
HELP NEEDED FOR BATHING: 3 - A LITTLE
WALKING IN HOSPITAL ROOM: 3 - A LITTLE
MOVING TO AND FROM BED TO CHAIR: 3 - A LITTLE
DRESSING REGULAR LOWER BODY CLOTHING: 3 - A LITTLE
CLIMB 3 TO 5 STEPS WITH RAILING: 2 - A LOT

## 2019-12-23 ASSESSMENT — ENCOUNTER SYMPTOMS
WEAKNESS: 0
SHORTNESS OF BREATH: 1
WHEEZING: 1
HEMOPTYSIS: 0
LIGHT-HEADEDNESS: 0
SPUTUM PRODUCTION: 1
DIZZINESS: 0
VOMITING: 0
COUGH: 1
ACTIVITY CHANGE: 0
CHEST TIGHTNESS: 1
NAUSEA: 0
BACK PAIN: 0
CHILLS: 0
DIFFICULTY URINATING: 0
ABDOMINAL PAIN: 0
FEVER: 0

## 2019-12-23 NOTE — ED ATTESTATION NOTE
Patient seen and examined with PA.  Presents to the ED with shortness of breath and cough.  Very short of breath of the past 1 week.  Patient is on Zithromax.  On steroids.      Moderate tachypnea.  Moderate respiratory distress                Exam  Vital signs noted  Mild distress  deCreased air movement with diffuse wheezing  Normal cardiac exam  Abdomen soft  Normal neuro exam                Patient presents the ED with shortness of breath and cough.  Patient found to be tachypneic.  Limited air movement.  Started on BiPAP.  Given Solu-Medrol albuterol.    Critical Care  Performed by: Larry Alvarez MD  Authorized by: Larry Alvarez MD    Critical care provider statement:     Critical care time (minutes):  35    Critical care time was exclusive of:  Separately billable procedures and treating other patients    Critical care was time spent personally by me on the following activities:  Development of treatment plan with patient or surrogate, discussions with consultants, discussions with primary provider, evaluation of patient's response to treatment, examination of patient, review of old charts, re-evaluation of patient's condition, pulse oximetry, ordering and review of radiographic studies, ordering and review of laboratory studies and ordering and performing treatments and interventions         Larry Alvarez MD  12/23/19 5714

## 2019-12-23 NOTE — ASSESSMENT & PLAN NOTE
Follows with Dr. Montano  methylprednisolone transitioned to BID prednisone today  DC 1/5 - he doesn't feel well enough today  He has completed azithromycin and 5 days of levofloxacin  Continue with neb treatment and pulmonary toilet

## 2019-12-23 NOTE — NURSING NOTE
Patient admitted to unit S/P COD Exac. AAOX3 capable of making his needs known. On exam, pt. Presents with audible Inspiratory/expiratory wheezing, Spo2 96% on O2 at 6l/min, no respiratory distress noted. HOB elevated, Neb treatment administered. Assist x 1 with ADLs and ambulation. Patient educated on the importance of call bell usage when needing help and he expressed understanding. Will continue to monitor.

## 2019-12-23 NOTE — H&P
Internal Medicine  History & Physical        CHIEF COMPLAINT   SOB   HISTORY OF PRESENT ILLNESS      This is a 84 y.o. male with a past medical history of COPD on 2L home O2, HTN, HLD, CKD, prostate cancer s/p XRT who presents with SOB.  He notes progressively worsening shortness of breath over the past 1 week.  Last weekend, he was having dyspnea on exertion while walking around with his family.  Describes associated tightness and wheezing.  He attended pulmonary rehab last Monday and felt like his shortness of breath was progressing.  Was seen by his PCP Dr. Walters that day and was prescribed a 5-day course of prednisone and azithromycin with concern for COPD exacerbation.  He started his prednisone last week, however he misplaced his azithromycin prescription which was later called in by Dr. Montano's office on Friday.  Over the course of this past weekend, the shortness of breath intensified with onset of cough productive of light yellow sputum.  His family visited him earlier today and noted extreme difficulty with ambulation and going down the steps in his house, prompting his visit to the ED.  He denies fever, chills, chest pain, palpitations, abdominal pain, nausea, vomiting, and lower extremity swelling.    ED course: 60 mg methylprednisolone IV, duoneb, started on bipap 12/5    PAST MEDICAL AND SURGICAL HISTORY      PMHx:  Past Medical History:   Diagnosis Date   • COPD (chronic obstructive pulmonary disease) (CMS/HCC)    • Diverticulitis of colon     1987   • Emphysema lung (CMS/HCC)    • Hypertension    • Prostate cancer (CMS/HCC)     2007 s/p RT withouut recurrence       PSHx:  Past Surgical History:   Procedure Laterality Date   • COLON SURGERY     • MULTIPLE TOOTH EXTRACTIONS  Sept.-Nov 2018   • NASAL SEPTUM SURGERY     • SINUS SURGERY  11/2012 11/2012 by Dr. Mora       PCP:   Bella Walters MD    MEDICATIONS      Prior to Admission medications    Medication Sig Start Date End Date Taking?  Authorizing Provider   alendronate (FOSAMAX) 70 mg tablet Take 1 tablet by mouth once a week on Friday. 9/20/19  Yes Dipika Cali MD   amLODIPine (NORVASC) 5 mg tablet Take 5 mg by mouth daily.   Yes Dipika Cali MD   carvedilol (COREG) 3.125 mg tablet Take 3.125 mg by mouth 2 (two) times a day with meals.   Yes Dipika Cali MD   escitalopram (LEXAPRO) 10 mg tablet Take 1 tablet by mouth daily. 5/17/19  Yes Dipika Cali MD   LORazepam (ATIVAN) 0.5 mg tablet Take 1 tablet by mouth daily as needed. 6/3/19  Yes Dipika Cali MD   albuterol 2.5 mg /3 mL (0.083 %) nebulizer solution Take 2.5 mg by nebulization 3 (three) times a day as needed for wheezing.    Dipika Cali MD   albuterol HFA (VENTOLIN HFA) 90 mcg/actuation inhaler Inhale 2 puffs every 4 (four) hours as needed for wheezing.      Dipika Cali MD   atorvastatin (LIPITOR) 10 mg tablet Take 10 mg by mouth daily.    Dipika Cali MD   budesonide (PULMICORT) 0.5 mg/2 mL nebulizer solution Take 0.5 mg by nebulization 2 (two) times a day. Rinse mouth with water after use to reduce aftertaste and incidence of candidiasis. Do not swallow.    Dipika Cali MD   dilTIAZem CD (CARDIZEM CD) 120 mg 24 hr capsule Take 120 mg by mouth daily.    Dipika Cali MD   famotidine (PEPCID) 40 mg tablet Take 40 mg by mouth 2 times daily. 3/29/19   Dipika Cali MD   fexofenadine (ALLEGRA) 180 mg tablet Take 180 mg by mouth daily.    Dipika Cali MD   finasteride (PROSCAR) 5 mg tablet Take 5 mg by mouth daily.    Dipika Cali MD   montelukast (SINGULAIR) 10 mg tablet Take 10 mg by mouth nightly.      Dipika Cali MD   omeprazole (PriLOSEC) 20 mg capsule Take 20 mg by mouth daily before breakfast.    Dipika Cali MD   tamsulosin (FLOMAX) 0.4 mg capsule Take 0.4 mg by mouth 2 times daily.    Dipika Cali MD   tiotropium (SPIRIVA WITH HANDIHALER) 18  mcg per inhalation capsule Place 18 mcg into inhaler and inhale daily.    Dipika Cali MD   ALPRAZolam (XANAX) 0.25 mg tablet Take 0.25 mg by mouth as needed for anxiety.  19  Diipka Cali MD   amLODIPine (NORVASC) 10 mg tablet Take 1 tablet (10 mg total) by mouth daily. 19  Ramos Clinton MD   azelastine-fluticasone (DYMISTA) 137-50 mcg/spray nasal spray Administer 1 spray into each nostril 2 (two) times a day.  19  Dipika Cali MD   carvedilol (COREG) 12.5 mg tablet Take 1 tablet (12.5 mg total) by mouth 2 (two) times a day with meals. 4/15/19 12/23/19  Ramos Clinton MD   cefdinir (OMNICEF) 300 mg capsule Take 300 mg by mouth 2 (two) times a day.  19  Dipika Cali MD   PERFOROMIST 20 mcg/2 mL nebulizer solution Inhale 2 mL 2 (two) times a day. 19  Dipika Cali MD   predniSONE (DELTASONE) 10 mg tablet 40 mg (4 tablets) for 5 days, 3 tablets for 5 days, 2 tablets for 5 days, 1 tablet for 5 days, 5 mg (half tablet) for 5 days. 4/15/19 12/23/19  Ramos Clinton MD       Home medications were personally reviewed.    ALLERGIES      Vancomycin    FAMILY HISTORY      Family History   Problem Relation Age of Onset   • Melanoma Biological Mother    • Melanoma Biological Sister        SOCIAL HISTORY      Social History     Socioeconomic History   • Marital status:      Spouse name: None   • Number of children: None   • Years of education: None   • Highest education level: None   Occupational History   • None   Social Needs   • Financial resource strain: None   • Food insecurity:     Worry: None     Inability: None   • Transportation needs:     Medical: None     Non-medical: None   Tobacco Use   • Smoking status: Former Smoker     Packs/day: 2.00     Years: 40.00     Pack years: 80.00     Types: Cigarettes     Start date: 1954     Last attempt to quit: 1993     Years since quittin.4   • Smokeless tobacco: Never Used    Substance and Sexual Activity   • Alcohol use: No     Comment: stopped in 1987   • Drug use: No   • Sexual activity: Defer   Lifestyle   • Physical activity:     Days per week: None     Minutes per session: None   • Stress: None   Relationships   • Social connections:     Talks on phone: None     Gets together: None     Attends Restorationism service: None     Active member of club or organization: None     Attends meetings of clubs or organizations: None     Relationship status: None   • Intimate partner violence:     Fear of current or ex partner: None     Emotionally abused: None     Physically abused: None     Forced sexual activity: None   Other Topics Concern   • None   Social History Narrative   • None       REVIEW OF SYSTEMS      Review of Systems   All other systems reviewed and are negative.      PHYSICAL EXAMINATION      Temp:  [36.8 °C (98.3 °F)] 36.8 °C (98.3 °F)  Heart Rate:  [104-107] 106  Resp:  [22-30] 24  BP: (144-185)/(97-99) 144/97  FiO2 (%) (Set):  [40 %] 40 %  Body mass index is 30.9 kg/m².    Physical Exam   Constitutional: He is oriented to person, place, and time. He appears well-developed and well-nourished. No distress.   Comfortable on bipap   HENT:   Head: Normocephalic and atraumatic.   Eyes: Pupils are equal, round, and reactive to light. EOM are normal. No scleral icterus.   Neck: No JVD present.   Cardiovascular: Normal rate, regular rhythm, normal heart sounds and intact distal pulses.   Pulmonary/Chest:   Diffuse b/l coarse breath sounds with diffuse ronchi   Abdominal: Soft. Bowel sounds are normal. He exhibits no distension.   Musculoskeletal: He exhibits no edema.   Neurological: He is alert and oriented to person, place, and time. No cranial nerve deficit.   Skin: Skin is warm and dry. No rash noted.   Psychiatric: He has a normal mood and affect.       LABS / IMAGING / EKG        Labs  I have reviewed the patient's pertinent labs. Pertinent labs are within normal  limits.    Imaging  I have independently reviewed the pertinent imaging from the last 24 hrs.    ECG/Telemetry  I have independently reviewed the ECG. No significant findings.    ASSESSMENT AND PLAN           * COPD exacerbation (CMS/[facility])  Assessment & Plan  Has recently been around sick children, suspect exacerbation 2/2 viral URI  No evidence of pneumonia on CXR  Dr. Montano is pulmonologist    - c/w methylprednisolone 40 mg IV BID  - scheduled ipratroprium nebs given tachycardia  - c/w budesonide nebs  - wean off bipap to home 2L O2  - c/w montelukast, H1 blocker for allergy triggers  - will likely need to be discharged on prolonged steroid taper    Hypertension  Assessment & Plan  BP elevated in ED - has not taken meds this morning    - restart home meds amlodipine and carvedilol  - apparently also on diltiazem, will hold for now and clarify    GERD (gastroesophageal reflux disease)  Assessment & Plan  - c/w H2 blocker, PPI in the setting of steroid use    History of prostate cancer  Assessment & Plan   - c/w finasteride, tamsulosin      Hyperlipidemia  Assessment & Plan  - c/w atorvastatin    Chronic kidney disease (CKD), stage III (moderate) (CMS/[facility])  Assessment & Plan  Cr appears at baseline ~1.3    - limit nephrotoxic meds  - trend BMP       VTE Assessment: [place]    Code Status: Full Code  Estimated discharge date: 12/25/2019     ATTENDING DOCUMENTATION  ALSO SEE ATTENDING ATTESTATION SECTION OF NOTE

## 2019-12-23 NOTE — PLAN OF CARE
Problem: Adult Inpatient Plan of Care  Goal: Plan of Care Review  Outcome: Progressing  Flowsheets  Taken 12/23/2019 1825  Progress: improving  Outcome Summary: Transitioned from Bipap Cont. to O2 6l/min. Continues on Lakhwinder tx and IV Solumedrol  Taken 12/23/2019 1611  Plan of Care Reviewed With: patient;son

## 2019-12-23 NOTE — PROGRESS NOTES
Spoke with patient and confirmed with medication list from [facility] and/or patient's own pharmacy to complete the medication reconciliation.     Prior to admission medication list:    Current Outpatient Medications:   •  alendronate (FOSAMAX) 70 mg tablet, Take 1 tablet by mouth once a week on Friday., Disp: , Rfl:   •  amLODIPine (NORVASC) 5 mg tablet, Take 5 mg by mouth daily., Disp: , Rfl:   •  carvedilol (COREG) 3.125 mg tablet, Take 3.125 mg by mouth 2 (two) times a day with meals., Disp: , Rfl:   •  escitalopram (LEXAPRO) 10 mg tablet, Take 1 tablet by mouth daily., Disp: , Rfl:   •  LORazepam (ATIVAN) 0.5 mg tablet, Take 1 tablet by mouth daily as needed., Disp: , Rfl:   •  albuterol 2.5 mg /3 mL (0.083 %) nebulizer solution, Take 2.5 mg by nebulization 3 (three) times a day as needed for wheezing., Disp: , Rfl:   •  albuterol HFA (VENTOLIN HFA) 90 mcg/actuation inhaler, Inhale 2 puffs every 4 (four) hours as needed for wheezing.  , Disp: , Rfl:   •  atorvastatin (LIPITOR) 10 mg tablet, Take 10 mg by mouth daily., Disp: , Rfl:   •  budesonide (PULMICORT) 0.5 mg/2 mL nebulizer solution, Take 0.5 mg by nebulization 2 (two) times a day. Rinse mouth with water after use to reduce aftertaste and incidence of candidiasis. Do not swallow., Disp: , Rfl:   •  dilTIAZem CD (CARDIZEM CD) 120 mg 24 hr capsule, Take 120 mg by mouth daily., Disp: , Rfl:   •  famotidine (PEPCID) 40 mg tablet, Take 40 mg by mouth 2 times daily., Disp: , Rfl:   •  fexofenadine (ALLEGRA) 180 mg tablet, Take 180 mg by mouth daily., Disp: , Rfl:   •  finasteride (PROSCAR) 5 mg tablet, Take 5 mg by mouth daily., Disp: , Rfl:   •  montelukast (SINGULAIR) 10 mg tablet, Take 10 mg by mouth nightly.  , Disp: , Rfl:   •  omeprazole (PriLOSEC) 20 mg capsule, Take 20 mg by mouth daily before breakfast., Disp: , Rfl:   •  tamsulosin (FLOMAX) 0.4 mg capsule, Take 0.4 mg by mouth 2 times daily., Disp: , Rfl:   •  tiotropium (SPIRIVA WITH HANDIHALER) 18  mcg per inhalation capsule, Place 18 mcg into inhaler and inhale daily., Disp: , Rfl:     Compliance:   -Medication fills are recent. No compliance concerns based on patient interview.    Additional comments:   -Patient recently completed Prednisone taper 12/22/2019

## 2019-12-24 LAB
ANION GAP SERPL CALC-SCNC: 10 MEQ/L (ref 3–15)
BUN SERPL-MCNC: 37 MG/DL (ref 8–20)
CALCIUM SERPL-MCNC: 8.8 MG/DL (ref 8.9–10.3)
CHLORIDE SERPL-SCNC: 100 MEQ/L (ref 98–109)
CO2 SERPL-SCNC: 27 MEQ/L (ref 22–32)
CREAT SERPL-MCNC: 1.1 MG/DL
ERYTHROCYTE [DISTWIDTH] IN BLOOD BY AUTOMATED COUNT: 12.6 % (ref 11.6–14.4)
GFR SERPL CREATININE-BSD FRML MDRD: >60 ML/MIN/1.73M*2
GLUCOSE SERPL-MCNC: 174 MG/DL (ref 70–99)
HCT VFR BLDCO AUTO: 45.5 % (ref 40.1–51)
HGB BLD-MCNC: 15.3 G/DL
MCH RBC QN AUTO: 31.9 PG (ref 28–33.2)
MCHC RBC AUTO-ENTMCNC: 33.6 G/DL (ref 32.2–36.5)
MCV RBC AUTO: 94.8 FL (ref 83–98)
PDW BLD AUTO: 9.4 FL (ref 9.4–12.4)
PLATELET # BLD AUTO: 248 K/UL
POTASSIUM SERPL-SCNC: 4.7 MEQ/L (ref 3.6–5.1)
RBC # BLD AUTO: 4.8 M/UL (ref 4.5–5.8)
SODIUM SERPL-SCNC: 137 MEQ/L (ref 136–144)
WBC # BLD AUTO: 15.73 K/UL

## 2019-12-24 PROCEDURE — 63600000 HC DRUGS/DETAIL CODE: Performed by: INTERNAL MEDICINE

## 2019-12-24 PROCEDURE — 85027 COMPLETE CBC AUTOMATED: CPT | Performed by: INTERNAL MEDICINE

## 2019-12-24 PROCEDURE — 21400000 HC ROOM AND CARE CCU/INTERMEDIATE

## 2019-12-24 PROCEDURE — 63700000 HC SELF-ADMINISTRABLE DRUG: Performed by: INTERNAL MEDICINE

## 2019-12-24 PROCEDURE — 97166 OT EVAL MOD COMPLEX 45 MIN: CPT

## 2019-12-24 PROCEDURE — 99232 SBSQ HOSP IP/OBS MODERATE 35: CPT | Performed by: INTERNAL MEDICINE

## 2019-12-24 PROCEDURE — 36415 COLL VENOUS BLD VENIPUNCTURE: CPT | Performed by: INTERNAL MEDICINE

## 2019-12-24 PROCEDURE — 80048 BASIC METABOLIC PNL TOTAL CA: CPT | Performed by: INTERNAL MEDICINE

## 2019-12-24 PROCEDURE — 97162 PT EVAL MOD COMPLEX 30 MIN: CPT

## 2019-12-24 PROCEDURE — 25000000 HC PHARMACY GENERAL: Performed by: INTERNAL MEDICINE

## 2019-12-24 RX ORDER — GUAIFENESIN 600 MG/1
1200 TABLET, EXTENDED RELEASE ORAL 2 TIMES DAILY
Status: DISCONTINUED | OUTPATIENT
Start: 2019-12-24 | End: 2020-01-05 | Stop reason: HOSPADM

## 2019-12-24 RX ADMIN — HEPARIN SODIUM 5000 UNITS: 5000 INJECTION, SOLUTION INTRAVENOUS; SUBCUTANEOUS at 20:44

## 2019-12-24 RX ADMIN — CETIRIZINE HYDROCHLORIDE 10 MG: 10 TABLET, FILM COATED ORAL at 20:44

## 2019-12-24 RX ADMIN — METHYLPREDNISOLONE SODIUM SUCCINATE 40 MG: 40 INJECTION, POWDER, FOR SOLUTION INTRAMUSCULAR; INTRAVENOUS at 08:38

## 2019-12-24 RX ADMIN — HEPARIN SODIUM 5000 UNITS: 5000 INJECTION, SOLUTION INTRAVENOUS; SUBCUTANEOUS at 05:45

## 2019-12-24 RX ADMIN — ESCITALOPRAM OXALATE 10 MG: 10 TABLET, FILM COATED ORAL at 08:37

## 2019-12-24 RX ADMIN — HEPARIN SODIUM 5000 UNITS: 5000 INJECTION, SOLUTION INTRAVENOUS; SUBCUTANEOUS at 14:46

## 2019-12-24 RX ADMIN — FAMOTIDINE 20 MG: 20 TABLET ORAL at 08:37

## 2019-12-24 RX ADMIN — METHYLPREDNISOLONE SODIUM SUCCINATE 40 MG: 40 INJECTION, POWDER, FOR SOLUTION INTRAMUSCULAR; INTRAVENOUS at 20:45

## 2019-12-24 RX ADMIN — FORMOTEROL FUMARATE DIHYDRATE 20 MCG: 20 SOLUTION RESPIRATORY (INHALATION) at 17:24

## 2019-12-24 RX ADMIN — ATORVASTATIN CALCIUM 10 MG: 10 TABLET, FILM COATED ORAL at 08:37

## 2019-12-24 RX ADMIN — IPRATROPIUM BROMIDE 0.5 MG: 0.5 SOLUTION RESPIRATORY (INHALATION) at 17:24

## 2019-12-24 RX ADMIN — BUDESONIDE 0.5 MG: 0.5 INHALANT ORAL at 05:44

## 2019-12-24 RX ADMIN — TAMSULOSIN HYDROCHLORIDE 0.4 MG: 0.4 CAPSULE ORAL at 08:38

## 2019-12-24 RX ADMIN — FINASTERIDE 5 MG: 5 TABLET, FILM COATED ORAL at 08:37

## 2019-12-24 RX ADMIN — AMLODIPINE BESYLATE 5 MG: 5 TABLET ORAL at 08:37

## 2019-12-24 RX ADMIN — PANTOPRAZOLE SODIUM 20 MG: 20 TABLET, DELAYED RELEASE ORAL at 08:38

## 2019-12-24 RX ADMIN — IPRATROPIUM BROMIDE 0.5 MG: 0.5 SOLUTION RESPIRATORY (INHALATION) at 08:33

## 2019-12-24 RX ADMIN — FORMOTEROL FUMARATE DIHYDRATE 20 MCG: 20 SOLUTION RESPIRATORY (INHALATION) at 08:36

## 2019-12-24 RX ADMIN — MONTELUKAST 10 MG: 10 TABLET, FILM COATED ORAL at 20:44

## 2019-12-24 RX ADMIN — CARVEDILOL 3.12 MG: 3.12 TABLET, FILM COATED ORAL at 08:37

## 2019-12-24 RX ADMIN — CARVEDILOL 3.12 MG: 3.12 TABLET, FILM COATED ORAL at 17:13

## 2019-12-24 RX ADMIN — GUAIFENESIN 1200 MG: 600 TABLET, EXTENDED RELEASE ORAL at 20:44

## 2019-12-24 RX ADMIN — BUDESONIDE 0.5 MG: 0.5 INHALANT ORAL at 17:24

## 2019-12-24 RX ADMIN — IPRATROPIUM BROMIDE 0.5 MG: 0.5 SOLUTION RESPIRATORY (INHALATION) at 12:36

## 2019-12-24 ASSESSMENT — COGNITIVE AND FUNCTIONAL STATUS - GENERAL
WALKING IN HOSPITAL ROOM: 3 - A LITTLE
MOVING TO AND FROM BED TO CHAIR: 4 - NONE
DRESSING REGULAR LOWER BODY CLOTHING: 3 - A LITTLE
STANDING UP FROM CHAIR USING ARMS: 4 - NONE
HELP NEEDED FOR PERSONAL GROOMING: 4 - NONE
EATING MEALS: 4 - NONE
AFFECT: WFL
DRESSING REGULAR UPPER BODY CLOTHING: 4 - NONE
CLIMB 3 TO 5 STEPS WITH RAILING: 3 - A LITTLE
TOILETING: 4 - NONE
HELP NEEDED FOR BATHING: 3 - A LITTLE

## 2019-12-24 NOTE — PROGRESS NOTES
[facility]: Referral received, chart reviewed. Per MD progress note, RACHEL 12/26/19.  HC to f/u 12/26/19 for dc needs.  Anne Marie Johnson RN CCM [id number]

## 2019-12-24 NOTE — PROGRESS NOTES
Patient: Zuhair Luong Jr  Location: [place] [address]  MRN: [id number]  Today's date: 12/24/2019      Pt left in chair with call bell and personal items within reach. RN informed    Hospital Course  Ed is a 84 y.o. male admitted on 12/23/2019 with COPD exacerbation (CMS/HCC) [J44.1]. Principal problem is COPD exacerbation (CMS/HCC).    Ed has a past medical history of COPD (chronic obstructive pulmonary disease) (CMS/HCC), Diverticulitis of colon, Emphysema lung (CMS/HCC), Hypertension, and Prostate cancer (CMS/HCC).    Therapy Pain/Vitals     Row Name 12/24/19 1136 12/24/19 1145 12/24/19 1201       Pain/Comfort/Sleep    Pain Charting Type  Pain Assessment  Pain Assessment  --    Preferred Pain Scale  number (Numeric Rating Pain Scale)  number (Numeric Rating Pain Scale)  --    Pain Rating (0-10): Rest  0  0  --    Pain Rating (0-10): Activity  0  0  --       Vital Signs    Pulse  (!) 101  (!) 105  (!) 108    SpO2  95 %  94 %  93 %    Patient Activity  At rest  At rest  Other (Comment)    Oxygen Therapy  Supplemental oxygen  None (Room air)  None (Room air)    O2 Delivery Method  Nasal cannula  --  --    O2 Flow Rate (L/min)  5 L/min  --  --    BP  130/80  --  --    Patient Position  --  Sitting  Sitting          Prior Living Environment      Most Recent Value   Living Arrangements  house   Living Environment Comment  lives with wife, 2SH, 1st fl set up, 1STE          Prior Level of Function      Most Recent Value   Ambulation  independent   Transferring  independent   Toileting  independent   Bathing  independent   Dressing  independent   Eating  independent   Communication  understands/communicates without difficulty   Swallowing  swallows foods/liquids without difficulty   Prior Level of Function Comment  ambs without AD, on home 2LNC O2   Equipment Currently Used at Home  oxygen          PT Evaluation and Treatment - 12/24/19 1145        Time Calculation    Start Time  1145     Stop  Time  1202     Time Calculation (min)  17 min        Session Details    Document Type  initial evaluation     Mode of Treatment  physical therapy        General Information    Patient Profile Reviewed?  yes     Existing Precautions/Restrictions  oxygen therapy device and L/min        Cognition/Psychosocial    Orientation Status (Cognition)  oriented x 3        Sensory Assessment (Somatosensory)    Sensory Assessment (Somatosensory)  sensation intact;bilateral LE        Range of Motion Comprehensive    Comprehensive Range of Motion  ROM is WNL        Strength (Manual Muscle Testing)    Strength (Manual Muscle Testing)  strength is WFL;bilateral lower extremities        Sit to Stand Transfer    [place], Sit to Stand Transfer  modified independence        Stand to Sit Transfer    [place], Stand to Sit Transfer  modified independence        Gait Training    [place], Gait  supervision     Assistive Device  none     Distance in Feet  10 feet     Gait Pattern Utilized  swing-through     Deviations/Abnormal Patterns (Gait)  den decreased;gait speed decreased;stride length decreased     Comment  limited by SOB        AM-PAC (TM) - Mobility (Current Function)    Turning from your back to your side while in a flat bed without using bedrails?  4 - None     Moving from lying on your back to sitting on the side of a flat bed without using bedrails?  4 - None     Moving to and from a bed to a chair?  4 - None     Standing up from a chair using your arms?  4 - None     To walk in a hospital room?  3 - A Little     Climbing 3-5 steps with a railing?  3 - A Little     AM-PAC (TM) Mobility Score  22        Therapy Assessment/Plan (PT)    Rehab Potential (PT)  good, to achieve stated therapy goals     Therapy Frequency (PT)  3 times/wk        Progress Summary (PT)    Daily Outcome Statement (PT)  pt presents at S/I level for mobiltiy, limited by SOB, decreased endurance. pt appropriate for home when med stable.         Therapy Plan Review/Discharge Plan (PT)    Anticipated Equipment Needs at Discharge (PT Eval)  none     PT Recommended Discharge Disposition  home with assist        Plan of Care Review    Plan of Care Reviewed With  patient           Pain Assessment/Intervention  Pain Charting Type: Pain Assessment             Education provided this session. See the Patient Education summary report for full details.    PT Goals      Most Recent Value   Gait Training Goal 1   Activity/Assistive Device  gait (walking locomotion) at 12/24/2019 1145   [place]  independent at 12/24/2019 1145   Distance  150 at 12/24/2019 1145   Time Frame  by discharge at 12/24/2019 1145   Progress/Outcome  goal ongoing at 12/24/2019 1145   Stairs Goal 1   Activity/Assistive Device  stairs, all skills at 12/24/2019 1145   [place]  modified independence at 12/24/2019 1145   Number of Stairs  12 at 12/24/2019 1145   Time Frame  by discharge at 12/24/2019 1145   Progress/Outcome  goal ongoing at 12/24/2019 1145

## 2019-12-24 NOTE — PLAN OF CARE
Problem: Adult Inpatient Plan of Care  Goal: Plan of Care Review  Flowsheets (Taken 12/24/2019 9799)  Progress: improving  Plan of Care Reviewed With: patient  Outcome Summary: Patient demonstrates a decline in independence with ADLs and functional transfers 2/2 decreased activity tolerance and weakness. Will continue to follow in order to maximize independence with ADLs and functional transfers.

## 2019-12-24 NOTE — PLAN OF CARE
Problem: Adult Inpatient Plan of Care  Goal: Plan of Care Review  Outcome: Progressing  Flowsheets (Taken 12/24/2019 7546)  Progress: improving  Plan of Care Reviewed With: patient  Outcome Summary: PT eval completed, rec home

## 2019-12-24 NOTE — PLAN OF CARE
Problem: Adult Inpatient Plan of Care  Goal: Plan of Care Review  Outcome: Progressing  Flowsheets (Taken 12/23/2019 1932)  Progress: improving  Plan of Care Reviewed With: patient  Outcome Summary: Pt will remain free of fall during this shift.

## 2019-12-24 NOTE — PLAN OF CARE
Problem: Adult Inpatient Plan of Care  Goal: Plan of Care Review  Outcome: Progressing  Flowsheets (Taken 12/24/2019 1523)  Progress: improving  Plan of Care Reviewed With: patient  Outcome Summary: continue nebs ATC, O2 in place     Problem: Adult Inpatient Plan of Care  Goal: Patient-Specific Goal (Individualization)  Outcome: Progressing  Flowsheets (Taken 12/24/2019 1523)  Patient-Specific Goals (Include Timeframe): to be seen by pulmonary team on 12/24  Individualized Care Needs: nebs ATC, IV solumedrol, O2 in place  Anxieties, Fears or Concerns: requesting pulm consult, physician notified and c/s placed

## 2019-12-24 NOTE — CONSULTS
Pulmonology Consult Note    Subjective     Zuhair Luong Jr is a 84 y.o. 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 He reports cough with yellow sputum which has now turned clear, as well as severe chest tightness and SOB.  No preceding URI that he is aware of, and no apparent trigger.  Has been sleeping with HOB elevated on electric bed.  No abd pain/n/v/d. Appetite has been good. No fevers or chills.  As an outpt he had received course of po prednisone and azithro.  He was started on IV steroids and nebs and now feels much improved.  He also required bipap after presentation, now comfortable on 5L NC O2.          Past Medical History:   Diagnosis Date   • COPD (chronic obstructive pulmonary disease) (CMS/[facility])    • Diverticulitis of colon     1987   • Emphysema lung (CMS/[facility])    • Hypertension    • Prostate cancer (CMS/[facility])     2007 s/p RT withouut recurrence     Past Surgical History:   Procedure Laterality Date   • COLON SURGERY     • MULTIPLE TOOTH EXTRACTIONS  Sept.-Nov 2018   • NASAL SEPTUM SURGERY     • SINUS SURGERY  11/2012 11/2012 by Dr. Mora     Social History     Socioeconomic History   • Marital status:      Spouse name: None   • Number of children: None   • Years of education: None   • Highest education level: None   Occupational History   • None   Social Needs   • Financial resource strain: None   • Food insecurity:     Worry: None     Inability: None   • Transportation needs:     Medical: None     Non-medical: None   Tobacco Use   • Smoking status: Former Smoker     Packs/day: 2.00     Years: 40.00     Pack years: 80.00     Types: Cigarettes     Start date: 8/1/1954     Last attempt to quit: 8/1/1993      Years since quittin.4   • Smokeless tobacco: Never Used   Substance and Sexual Activity   • Alcohol use: No     Frequency: Never     Binge frequency: Never     Comment: stopped in    • Drug use: No   • Sexual activity: Defer   Lifestyle   • Physical activity:     Days per week: None     Minutes per session: None   • Stress: None   Relationships   • Social connections:     Talks on phone: None     Gets together: None     Attends Spiritism service: None     Active member of club or organization: None     Attends meetings of clubs or organizations: None     Relationship status: None   • Intimate partner violence:     Fear of current or ex partner: None     Emotionally abused: None     Physically abused: None     Forced sexual activity: None   Other Topics Concern   • None   Social History Narrative   • None     Family History   Problem Relation Age of Onset   • Melanoma Biological Mother    • Melanoma Biological Sister        Allergies: Vancomycin        Review of Systems:  General - no anorexia, no weight loss, no fevers  ENT - no sore throat, no difficulty swallowing  Endocrine - no cold intolerance, no heat intolerance  Hematology - no easy bruising, no bleeding  Respiratory - no hemoptysis, no chest pain  Cardiovascular - no dizziness, no palpitations  Gastrointestinal - no abd pain, no N/V/D  Muskuloskeletal - no myalgias, no arthralgias  Skin - no rashes, no lesions  Neuro - no numbness, no tingling        Objective     Vital Signs for the last 24 hours:  Temp:  [36.3 °C (97.3 °F)-37.1 °C (98.7 °F)] 37.1 °C (98.7 °F)  Heart Rate:  [] 102  Resp:  [20] 20  BP: (124-171)/(78-88) 124/82  SpO2:  [93 %-99 %] 96 %    Oxygen:  Oxygen Therapy: Supplemental oxygen  O2 Delivery Method: Nasal cannula  FiO2 (%) (Set): 40 %  O2 Flow Rate (L/min): 5 L/min     Physical Exam:    General - well appearing, in no acute distress  HEENT - OP clear without exudates or lesions  Neck - no lymphadenopathy or  masses  Lungs - diffuse bilateral wheezing and rhonchi  Heart - RRR; no murmurs  Abd - soft, NTND  Ext - no cyanosis, clubbing, edema  Skin - no rashes or lesions  Neuro - alert and oriented x 3; normal affect    Labs:  Labs reviewed:  VBG 7.36/53    Wbc 15.7  HCO3 27    Flu swab negative    Imaging:    CXR personally reviewed and shows no new infiltrate    Imp:  Acute COPD exacerbation without clear trigger, ?due to prior tracheobronchitis  H/o DRES syndrome    Rec:  - cont solumedrol 40mg IV q12 until lung exam further improves  - check sputum cx  - cont standing nebs with budesonide +formoterol + atrovent  - add mucinex    Reva Montano MD

## 2019-12-24 NOTE — PROGRESS NOTES
Patient: Zuhair Luong Jr  Location: [place] [address]  MRN: [id number]  Today's date: 12/24/2019    Patient left seated in reclining chair, call bell and personal items within reach.    Hospital Course  Ed is a 84 y.o. male admitted on 12/23/2019 with COPD exacerbation (CMS/HCC) [J44.1]. Principal problem is COPD exacerbation (CMS/HCC).    Ed has a past medical history of COPD (chronic obstructive pulmonary disease) (CMS/HCC), Diverticulitis of colon, Emphysema lung (CMS/HCC), Hypertension, and Prostate cancer (CMS/HCC).    Therapy Pain/Vitals - 12/24/19 1136        Pain/Comfort/Sleep    Pain Charting Type  Pain Assessment     Preferred Pain Scale  number (Numeric Rating Pain Scale)     Pain Rating (0-10): Rest  0     Pain Rating (0-10): Activity  0        Vital Signs    Pulse  (!) 101     SpO2  95 %     Patient Activity  At rest     Oxygen Therapy  Supplemental oxygen     O2 Delivery Method  Nasal cannula     O2 Flow Rate (L/min)  5 L/min     BP  130/80           Prior Living Environment      Most Recent Value   Living Arrangements  house   Living Environment Comment  1STE, 2SH, 1st floor s/u, tub and stall shower          Prior Level of Function      Most Recent Value   Ambulation  independent   Transferring  independent   Toileting  independent   Bathing  independent   Dressing  independent   Eating  independent   Communication  understands/communicates without difficulty   Prior Level of Function Comment  Patient reports driving PTA, independent with ADLs and functional transfers w/o use of DME   Equipment Currently Used at Home  nebulizer, oxygen, inhaler [Oxygen is through [employer]. Wife can bring portable tank]          OT Evaluation and Treatment - 12/24/19 1136        Time Calculation    Start Time  1136     Stop Time  1146     Time Calculation (min)  10 min        Session Details    Document Type  initial evaluation     Mode of Treatment  occupational therapy        General  Information    Patient Profile Reviewed?  yes     General Observations of Patient  Patient received asleep, supine with HOB elevated, 5L O2 via NC     Existing Precautions/Restrictions  oxygen therapy device and L/min        Cognition/Psychosocial    Affect/Mental Status (Cognitive)  WFL     Orientation Status (Cognition)  oriented x 3     Follows Commands (Cognition)  WFL     Cognitive Function (Cognitive)  WFL        Range of Motion Comprehensive    Comprehensive Range of Motion  ROM is WFL        Strength (Manual Muscle Testing)    Strength (Manual Muscle Testing)  strength is WFL        Bed Mobility    [place], Supine to Sit  supervision     Comment (Bed Mobility)  flat bed        Transfers    Transfers  toilet transfer        Bed to Chair Transfer    [place], Bed to Chair  close supervision        Sit to Stand Transfer    [place], Sit to Stand Transfer  close supervision     Assistive Device  none        Stand to Sit Transfer    [place], Stand to Sit Transfer  close supervision     Assistive Device  none        Toilet Transfer    Transfer Technique  sit-stand;stand-sit     [place], Toilet Transfer  close supervision     Assistive Device  none        Lower Body Dressing    Self-Performance  dons/doffs left sock;dons/doffs right sock     [place]  minimum assist (75% or more patient effort)     Position  edge of bed sitting        AM-PAC (TM) - ADL (Current Function)    Putting on and taking off regular lower body clothing?  3 - A Little     Bathing?  3 - A Little     Toileting?  4 - None     Putting on/taking off regular upper body clothing?  4 - None     How much help for taking care of personal grooming?  4 - None     Eating meals?  4 - None     AM-PAC (TM) ADL Score  22        Therapy Assessment/Plan (OT)    Rehab Potential (OT)  good, to achieve stated therapy goals     Therapy Frequency (OT)  2-3 times/wk        Progress Summary (OT)    Progress Toward Functional Goals (OT)   progressing toward functional goals as expected     Daily Outcome Statement (OT)  Patient demonstrates a decline in independence with ADLs and functional transfers 2/2 decreased activity tolerance and weakness. Will continue to follow in order to maximize independence with ADLs and functional transfers.        Therapy Plan Review/Discharge Plan (OT)    Anticipated Equipment Needs At Discharge (OT)  none     OT Recommended Discharge Disposition  home           Pain Assessment/Intervention  Pain Charting Type: Pain Assessment        Education provided this session. See the Patient Education summary report for full details.         OT Goals      Most Recent Value   Grooming Goal 1   Activity/Assistive Device  grooming skills, all at 12/24/2019 1136   [place]  modified independence at 12/24/2019 1136   Time Frame  by discharge at 12/24/2019 1136   Progress/Outcome  goal ongoing at 12/24/2019 1136

## 2019-12-24 NOTE — PROGRESS NOTES
Internal Medicine  Daily Progress Note       SUBJECTIVE   This is a 84 y.o. year-old male admitted on 2019 with COPD exacerbation (CMS/HCC) [J44.1].    Patient seen and examined at bedside this morning. No acute events overnight. SOB improving.      OBJECTIVE      Vital signs in last 24 hours:  Temp:  [36.3 °C (97.3 °F)-36.8 °C (98.3 °F)] 36.3 °C (97.4 °F)  Heart Rate:  [] 86  Resp:  [20-30] 20  BP: (138-185)/(71-99) 138/87  FiO2 (%) (Set):  [40 %] 40 %  Temp (24hrs), Av.6 °C (97.9 °F), Min:36.3 °C (97.3 °F), Max:36.8 °C (98.3 °F)    Intake/Output     None          PHYSICAL EXAMINATION      Physical Exam    Constitutional: He is oriented to person, place, and time. He appears well-developed and well-nourished. No distress.   HENT:   Head: Normocephalic and atraumatic.   Eyes: Pupils are equal, round, and reactive to light. EOM are normal. No scleral icterus.   Neck: No JVD present.   Cardiovascular: Normal rate, regular rhythm, normal heart sounds and intact distal pulses.   Pulmonary/Chest:   Diffuse b/l coarse breath sounds with diffuse ronchi   Abdominal: Soft. Bowel sounds are normal. He exhibits no distension.   Musculoskeletal: He exhibits no edema.   Neurological: He is alert and oriented to person, place, and time. No cranial nerve deficit.   Skin: Skin is warm and dry. No rash noted.   Psychiatric: He has a normal mood and affect.        LINES, CATHETERS, DRAINS, AIRWAYS, AND WOUNDS   Lines, Drains, Airways, Wounds:  Peripheral IV 19 Left Antecubital (Active)   Number of days: 1       Comments:      LABS / IMAGING / TELE      Labs  Labs are pending.    Imaging  I have independently reviewed the pertinent imaging from the last 24 hrs.    ECG/Telemetry  I have independently reviewed the telemetry. No events for the last 24 hours.    ASSESSMENT AND PLAN      * COPD exacerbation (CMS/HCC)  Assessment & Plan  Has recently been around sick children, suspect exacerbation 2/2 viral  URI  No evidence of pneumonia on CXR  Dr. Montano is pulmonologist    - c/w methylprednisolone 40 mg IV BID  - scheduled ipratroprium nebs given tachycardia  - c/w budesonide nebs, LAMA  - wean off bipap to home 2L O2  - c/w montelukast, H1 blocker for allergy triggers  - will likely need to be discharged on prolonged steroid taper    Hypertension  Assessment & Plan  BP elevated in ED - has not taken meds this morning    - restart home meds amlodipine and carvedilol  - apparently also on diltiazem, will hold for now and clarify    GERD (gastroesophageal reflux disease)  Assessment & Plan  - c/w H2 blocker, PPI in the setting of steroid use    History of prostate cancer  Assessment & Plan   - c/w finasteride, tamsulosin      Hyperlipidemia  Assessment & Plan  - c/w atorvastatin    Chronic kidney disease (CKD), stage III (moderate) (CMS/HCC)  Assessment & Plan  Cr appears at baseline ~1.3    - limit nephrotoxic meds  - trend BMP       VTE Assessment: Padua    Code Status: Full Code  Estimated discharge date: 12/25/2019     ATTENDING DOCUMENTATION  ALSO SEE ATTENDING ATTESTATION SECTION OF NOTE

## 2019-12-24 NOTE — PLAN OF CARE
Problem: Adult Inpatient Plan of Care  Goal: Plan of Care Review  Flowsheets (Taken 12/24/2019 1143)  Progress: improving  Plan of Care Reviewed With: other (see comments); patient (medical team)  Outcome Summary: As per medical rounds, patient needs 1-2 more days of IV steroids. PT/OT pending       Problem: Adult Inpatient Plan of Care  Goal: Readiness for Transition of Care  Intervention: Mutually Develop Transition Plan  Flowsheets (Taken 12/24/2019 1144)  Anticipated Discharge Disposition: home with home health services; home with assistance  Equipment Needed After Discharge: none  Equipment Currently Used at Home: nebulizer; oxygen; inhaler (Oxygen is through [employer]. Wife can bring portable tank)  Readmission Within the Last 30 Days: no previous admission in last 30 days  Patient/Family Anticipated Services at Transition: home health care  Patient/Family Anticipates Transition to: home with family  Transportation Anticipated: family or friend will provide  Concerns to be Addressed: care coordination/care conferences; discharge planning  Patient's Choice of Community Agency(s): Patient would like [facility] services.  Offered/Gave Vendor List: yes     Patient lives in a 2Sh with his wife. [facility] has [facility]. He would like [facility]. Referral submitted for [facility]. CM completed COPD consult.  CM confirmed pharmacy and PCP.     AS per medical rounds, patient is on IV steroids. PT rec'd home. RACHEL is W/Th.

## 2019-12-24 NOTE — HOSPITAL COURSE
Ed is a 84 y.o. male admitted on 12/23/2019 with COPD exacerbation (CMS/HCC) [J44.1]. Principal problem is COPD exacerbation (CMS/HCC).    Ed has a past medical history of COPD (chronic obstructive pulmonary disease) (CMS/HCC), Diverticulitis of colon, Emphysema lung (CMS/HCC), Hypertension, and Prostate cancer (CMS/HCC).

## 2019-12-25 LAB
ANION GAP SERPL CALC-SCNC: 9 MEQ/L (ref 3–15)
BUN SERPL-MCNC: 37 MG/DL (ref 8–20)
CALCIUM SERPL-MCNC: 9 MG/DL (ref 8.9–10.3)
CHLORIDE SERPL-SCNC: 102 MEQ/L (ref 98–109)
CO2 SERPL-SCNC: 29 MEQ/L (ref 22–32)
CREAT SERPL-MCNC: 1.2 MG/DL
ERYTHROCYTE [DISTWIDTH] IN BLOOD BY AUTOMATED COUNT: 12.7 % (ref 11.6–14.4)
GFR SERPL CREATININE-BSD FRML MDRD: 58 ML/MIN/1.73M*2
GLUCOSE SERPL-MCNC: 103 MG/DL (ref 70–99)
GRAM STN SPEC: NORMAL
HCT VFR BLDCO AUTO: 45.3 % (ref 40.1–51)
HGB BLD-MCNC: 15.2 G/DL
MCH RBC QN AUTO: 31.7 PG (ref 28–33.2)
MCHC RBC AUTO-ENTMCNC: 33.6 G/DL (ref 32.2–36.5)
MCV RBC AUTO: 94.4 FL (ref 83–98)
PDW BLD AUTO: 9.5 FL (ref 9.4–12.4)
PLATELET # BLD AUTO: 252 K/UL
POTASSIUM SERPL-SCNC: 4.7 MEQ/L (ref 3.6–5.1)
RBC # BLD AUTO: 4.8 M/UL (ref 4.5–5.8)
SODIUM SERPL-SCNC: 140 MEQ/L (ref 136–144)
WBC # BLD AUTO: 17.92 K/UL

## 2019-12-25 PROCEDURE — 25000000 HC PHARMACY GENERAL: Performed by: INTERNAL MEDICINE

## 2019-12-25 PROCEDURE — 87205 SMEAR GRAM STAIN: CPT | Performed by: INTERNAL MEDICINE

## 2019-12-25 PROCEDURE — 87070 CULTURE OTHR SPECIMN AEROBIC: CPT | Performed by: INTERNAL MEDICINE

## 2019-12-25 PROCEDURE — 99232 SBSQ HOSP IP/OBS MODERATE 35: CPT | Performed by: INTERNAL MEDICINE

## 2019-12-25 PROCEDURE — 21400000 HC ROOM AND CARE CCU/INTERMEDIATE

## 2019-12-25 PROCEDURE — 80048 BASIC METABOLIC PNL TOTAL CA: CPT | Performed by: INTERNAL MEDICINE

## 2019-12-25 PROCEDURE — 63600000 HC DRUGS/DETAIL CODE: Performed by: INTERNAL MEDICINE

## 2019-12-25 PROCEDURE — 36415 COLL VENOUS BLD VENIPUNCTURE: CPT | Performed by: INTERNAL MEDICINE

## 2019-12-25 PROCEDURE — 63700000 HC SELF-ADMINISTRABLE DRUG: Performed by: INTERNAL MEDICINE

## 2019-12-25 PROCEDURE — 85027 COMPLETE CBC AUTOMATED: CPT | Performed by: INTERNAL MEDICINE

## 2019-12-25 RX ORDER — AMLODIPINE BESYLATE 5 MG/1
5 TABLET ORAL ONCE
Status: COMPLETED | OUTPATIENT
Start: 2019-12-25 | End: 2019-12-25

## 2019-12-25 RX ORDER — AMLODIPINE BESYLATE 10 MG/1
10 TABLET ORAL DAILY
Status: DISCONTINUED | OUTPATIENT
Start: 2019-12-26 | End: 2020-01-05 | Stop reason: HOSPADM

## 2019-12-25 RX ADMIN — IPRATROPIUM BROMIDE 0.5 MG: 0.5 SOLUTION RESPIRATORY (INHALATION) at 22:41

## 2019-12-25 RX ADMIN — TAMSULOSIN HYDROCHLORIDE 0.4 MG: 0.4 CAPSULE ORAL at 08:59

## 2019-12-25 RX ADMIN — HEPARIN SODIUM 5000 UNITS: 5000 INJECTION, SOLUTION INTRAVENOUS; SUBCUTANEOUS at 06:05

## 2019-12-25 RX ADMIN — FORMOTEROL FUMARATE DIHYDRATE 20 MCG: 20 SOLUTION RESPIRATORY (INHALATION) at 17:23

## 2019-12-25 RX ADMIN — FORMOTEROL FUMARATE DIHYDRATE 20 MCG: 20 SOLUTION RESPIRATORY (INHALATION) at 08:58

## 2019-12-25 RX ADMIN — ATORVASTATIN CALCIUM 10 MG: 10 TABLET, FILM COATED ORAL at 08:59

## 2019-12-25 RX ADMIN — METHYLPREDNISOLONE SODIUM SUCCINATE 40 MG: 40 INJECTION, POWDER, FOR SOLUTION INTRAMUSCULAR; INTRAVENOUS at 21:09

## 2019-12-25 RX ADMIN — IPRATROPIUM BROMIDE 0.5 MG: 0.5 SOLUTION RESPIRATORY (INHALATION) at 17:23

## 2019-12-25 RX ADMIN — CARVEDILOL 3.12 MG: 3.12 TABLET, FILM COATED ORAL at 08:58

## 2019-12-25 RX ADMIN — ESCITALOPRAM OXALATE 10 MG: 10 TABLET, FILM COATED ORAL at 08:58

## 2019-12-25 RX ADMIN — CETIRIZINE HYDROCHLORIDE 10 MG: 10 TABLET, FILM COATED ORAL at 21:09

## 2019-12-25 RX ADMIN — GUAIFENESIN 1200 MG: 600 TABLET, EXTENDED RELEASE ORAL at 08:59

## 2019-12-25 RX ADMIN — FINASTERIDE 5 MG: 5 TABLET, FILM COATED ORAL at 08:59

## 2019-12-25 RX ADMIN — IPRATROPIUM BROMIDE 0.5 MG: 0.5 SOLUTION RESPIRATORY (INHALATION) at 06:05

## 2019-12-25 RX ADMIN — FAMOTIDINE 20 MG: 20 TABLET ORAL at 08:59

## 2019-12-25 RX ADMIN — GUAIFENESIN 1200 MG: 600 TABLET, EXTENDED RELEASE ORAL at 21:09

## 2019-12-25 RX ADMIN — BUDESONIDE 0.5 MG: 0.5 INHALANT ORAL at 17:23

## 2019-12-25 RX ADMIN — HEPARIN SODIUM 5000 UNITS: 5000 INJECTION, SOLUTION INTRAVENOUS; SUBCUTANEOUS at 14:54

## 2019-12-25 RX ADMIN — MONTELUKAST 10 MG: 10 TABLET, FILM COATED ORAL at 21:16

## 2019-12-25 RX ADMIN — AMLODIPINE BESYLATE 5 MG: 5 TABLET ORAL at 08:59

## 2019-12-25 RX ADMIN — IPRATROPIUM BROMIDE 0.5 MG: 0.5 SOLUTION RESPIRATORY (INHALATION) at 00:00

## 2019-12-25 RX ADMIN — CARVEDILOL 3.12 MG: 3.12 TABLET, FILM COATED ORAL at 17:23

## 2019-12-25 RX ADMIN — BUDESONIDE 0.5 MG: 0.5 INHALANT ORAL at 06:05

## 2019-12-25 RX ADMIN — METHYLPREDNISOLONE SODIUM SUCCINATE 40 MG: 40 INJECTION, POWDER, FOR SOLUTION INTRAMUSCULAR; INTRAVENOUS at 08:59

## 2019-12-25 RX ADMIN — AMLODIPINE BESYLATE 5 MG: 5 TABLET ORAL at 10:39

## 2019-12-25 RX ADMIN — IPRATROPIUM BROMIDE 0.5 MG: 0.5 SOLUTION RESPIRATORY (INHALATION) at 11:25

## 2019-12-25 RX ADMIN — HEPARIN SODIUM 5000 UNITS: 5000 INJECTION, SOLUTION INTRAVENOUS; SUBCUTANEOUS at 21:09

## 2019-12-25 RX ADMIN — PANTOPRAZOLE SODIUM 20 MG: 20 TABLET, DELAYED RELEASE ORAL at 08:59

## 2019-12-25 NOTE — PROGRESS NOTES
Internal Medicine  Daily Progress Note       SUBJECTIVE   This is a 84 y.o. year-old male admitted on 2019 with COPD exacerbation (CMS/HCC) [J44.1].    Patient seen and examined at bedside this morning. No acute events overnight. He is wheezing today. No other complaints.     OBJECTIVE      Vital signs in last 24 hours:  Temp:  [36.3 °C (97.4 °F)-37.1 °C (98.7 °F)] 36.3 °C (97.4 °F)  Heart Rate:  [] 100  Resp:  [18-20] 18  BP: (124-186)/(80-95) 164/82  Temp (24hrs), Av.7 °C (98.1 °F), Min:36.3 °C (97.4 °F), Max:37.1 °C (98.7 °F)    Intake/Output     None          PHYSICAL EXAMINATION      Physical Exam    Constitutional: He is oriented to person, place, and time. He appears well-developed and well-nourished. No distress.   HENT:   Head: Normocephalic and atraumatic.   Eyes: Pupils are equal, round, and reactive to light. EOM are normal. No scleral icterus.   Neck: No JVD present.   Cardiovascular: Normal rate, regular rhythm, normal heart sounds and intact distal pulses.   Pulmonary/Chest:   Improving b/l coarse breath sounds with diffuse ronchi, + expiratory wheezing  Abdominal: Soft. Bowel sounds are normal. He exhibits no distension.   Musculoskeletal: He exhibits no edema.   Neurological: He is alert and oriented to person, place, and time. No cranial nerve deficit.   Skin: Skin is warm and dry. No rash noted.   Psychiatric: He has a normal mood and affect.        LINES, CATHETERS, DRAINS, AIRWAYS, AND WOUNDS   Lines, Drains, Airways, Wounds:  Peripheral IV 19 Left Antecubital (Active)   Number of days: 2       Comments:      LABS / IMAGING / TELE      Labs  I have reviewed the patient's pertinent labs. Pertinent labs are within normal limits.    Imaging  Not applicable    ECG/Telemetry  I have independently reviewed the telemetry. No events for the last 24 hours.    ASSESSMENT AND PLAN      * COPD exacerbation (CMS/HCC)  Assessment & Plan  No evidence of pneumonia on CXR  Dr. Montano is  pulmonologist    - c/w methylprednisolone 40 mg IV BID - day 3  - scheduled ipratroprium nebs given tachycardia  - c/w budesonide nebs, LAMA  - c/w montelukast, H1 blocker for allergy triggers  - guaifenesin added  - will likely need to be discharged on prolonged steroid taper  - pulmonology following, recs appreciated    Hypertension  Assessment & Plan      - restart home meds amlodipine and carvedilol - uptitrate as needed given as he is on steroids  - apparently also on diltiazem, will hold for now and clarify    GERD (gastroesophageal reflux disease)  Assessment & Plan  - c/w H2 blocker, PPI in the setting of steroid use    History of prostate cancer  Assessment & Plan   - c/w finasteride, tamsulosin      Hyperlipidemia  Assessment & Plan  - c/w atorvastatin    Chronic kidney disease (CKD), stage III (moderate) (CMS/HCC)  Assessment & Plan  Cr appears at baseline ~1.3    - limit nephrotoxic meds  - trend BMP       VTE Assessment: [place]    Code Status: Full Code  Estimated discharge date: 2019     ATTENDING DOCUMENTATION  ALSO SEE ATTENDING ATTESTATION SECTION OF NOTE     I saw and evaluated the patient.  I discussed the case with the Resident and agree with the findings and plan as documented in the note except for my comments below or within the additional notes today.    Subjective:  No issues overnight. Pulmonology consulted.     Physical Exam:   Vitals:   Temp:  [36.3 °C (97.4 °F)-37.1 °C (98.7 °F)] 36.3 °C (97.4 °F)  Heart Rate:  [] 100  Resp:  [18-20] 18  BP: (124-186)/(80-95) 164/82  Temp (24hrs), Av.7 °C (98.1 °F), Min:36.3 °C (97.4 °F), Max:37.1 °C (98.7 °F)    Intake/Output     None           Labs/imaging:  Reviewed.     Results from last 7 days   Lab Units 19  0678   SODIUM mEQ/L 140   POTASSIUM mEQ/L 4.7   CHLORIDE mEQ/L 102   CO2 mEQ/L 29   BUN mg/dL 37*   CREATININE mg/dL 1.2   GLUCOSE mg/dL 103*   CALCIUM mg/dL 9.0          Results from last 7 days   Lab Units 19  0610    WBC K/uL 17.92*   HEMOGLOBIN g/dL 15.2   HEMATOCRIT % 45.3   PLATELETS K/uL 252          Telemetry/EKG:  Reviewed.    Assessment and plan:    # COPD on home O2: Continue steroids 40q12, nebulized steroids, DuoNebs, perforomist. Continue home montelukast, antihistamines. Mucinex added. No antibiotics given just completed course. BiPAP PRN, O2 for now.   # Tachycardia: Improving albeit slowly. May be albuterol related. Monitor. Defer CTA given improvement with steroids.   # HTN: Likely elevated in ER due to not taking meds and resp distress. Continue home meds.   # CKD: Cr at baseline. Renally dose meds.  # Leukocytosis: Likely 2/2 steroids. Trend fever curve.      Rest per Dr. Coy's documentation.      RACHEL: 12/26 to home     Bruno Eastman MD

## 2019-12-25 NOTE — PROGRESS NOTES
Pulmonary Progress Note    Subjective    Interval History: He feels worse today with increased yellow sputum and ongoing chest tightness and SOB.     Objective    Vital signs in last 24 hours:  Temp:  [36.3 °C (97.4 °F)-37.1 °C (98.7 °F)] 36.4 °C (97.5 °F)  Heart Rate:  [] 98  Resp:  [18] 18  BP: (143-186)/(68-95) 143/87    No intake or output data in the 24 hours ending 12/25/19 1617    Physical Exam:    General - well appearing, in no acute distress  HEENT - OP clear without exudates or lesions  Neck - no lymphadenopathy or masses  Lungs - diffuse bilateral wheezing and rhonchi  Heart - RRR; no murmurs  Abd - soft, NTND  Ext - no cyanosis, clubbing, edema  Skin - no rashes or lesions  Neuro - alert and oriented x 3; normal affect     Labs:  Labs reviewed:  12/25 sputum cx pending     Wbc 17.9  HCO3 29     Flu swab negative     Imaging:     CXR 12/23 personally reviewed and shows no new infiltrate     Imp:  Acute COPD exacerbation without clear trigger, ?due to tracheobronchitis  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

## 2019-12-25 NOTE — DISCHARGE SUMMARY
Internal Medicine  Inpatient Discharge Summary        BRIEF OVERVIEW   Admitting Provider: Micheal Elizalde MD  Attending Provider: Jono Peter DO Attending phys phone: [phone]    PCP: Bella Walters [phone]    Admission Date: 12/23/2019  Discharge Date: 1/5/2020     DISCHARGE DIAGNOSES      Primary Discharge Diagnosis  COPD exacerbation (CMS/[facility])    Secondary Discharge Diagnoses  Active Hospital Problems    Diagnosis Date Noted   • COPD exacerbation (CMS/[facility]) 04/11/2019     Priority: High   • Hyperlipidemia 12/23/2019     Priority: Low   • History of prostate cancer 12/23/2019     Priority: Low   • GERD (gastroesophageal reflux disease) 12/23/2019     Priority: Low   • Hypertension 04/11/2019     Priority: Low   • Chronic kidney disease (CKD), stage III (moderate) (CMS/[facility]) 04/11/2019      Resolved Hospital Problems   No resolved problems to display.       Active Problem List on Day of Discharge  Patient Active Problem List   Diagnosis   • COPD exacerbation (CMS/[facility])   • Chronic kidney disease (CKD), stage III (moderate) (CMS/[facility])   • Abdominal pain   • Hypertension   • Hyperlipidemia   • History of prostate cancer   • GERD (gastroesophageal reflux disease)     SUMMARY OF HOSPITALIZATION      Presenting Problem/History of Present Illness  COPD exacerbation (CMS/[facility])    This is a 84 y.o. year-old male admitted on 12/23/2019 with COPD exacerbation (CMS/[facility]) [J44.1].    Mr. Luong is an 84 M with a PMHx of COPD on 2L, 40+ pack year tobacco use (successfully quit), HTN, HLD, CKD who presented with around 1 week of shortness of breath that then worsened over the last 2 days. He had been taking prednisone and azithromycin for presumed COPD exacerbation and utilizing nebs but in the last 2 days nebs were no longer helpful. He notes new (in the last 1-2 days) development of cough with yellowish sputum production from his baseline of absent daily cough. No URI symptoms, fevers, chills, recent travel,  sick contacts.     Hospital Course    In the ER he was treated with IV steroids, nebulizers, and BiPAP with low settings. Bipap was stopped upon arrival to floors. IV steroids were continued along with scheduled Duonebs, budesonide, Perforomist, and mucinex. He was followed by pulmonology during his hospital course who added back azithromycin. CXR remained stable with no evidence of pneumonia. Sputum culture grew normal dago. Respiratory viral panel was negative.     COPD exacerbation  - s/p  IV steroids  - discharged with prolonged prednisone taper - 50 mg daily starting and continued for q2 and decreased by 10 mg  - c/w home meds  - outpatient pulm follow up    HTN  - c/w home amlodipine, carvedilol, diltiazem    GERD - H2 blocker and PPI in setting of steroid use    Hx prostate cancer  - finasteride and tamsulosin    HLD - atorvastatin    CKD 3 - Cr remained at baseline    Exam on Day of Discharge  Physical Exam   Constitutional: He is oriented to person, place, and time. He appears well-developed and well-nourished. No distress.   HENT:   Head: Normocephalic and atraumatic.   Eyes: EOM are normal. Right eye exhibits no discharge. Left eye exhibits no discharge.   Neck: Normal range of motion. No JVD present.   Cardiovascular: Regular rhythm and normal heart sounds. Exam reveals no gallop and no friction rub.   No murmur heard.  Pulmonary/Chest: Effort normal. No accessory muscle usage. No tachypnea. No respiratory distress. He has wheezes. He has no rhonchi. He has no rales.   Abdominal: Soft. Bowel sounds are normal. He exhibits no distension. There is no tenderness.   Musculoskeletal: Normal range of motion. He exhibits no edema.   Neurological: He is alert and oriented to person, place, and time.   Skin: Skin is warm and dry.   Psychiatric: He has a normal mood and affect. His behavior is normal.   Nursing note and vitals reviewed.    Consults During Admission  IP CONSULT TO CASE MANAGEMENT  IP CONSULT TO  RESPIRATORY CARE  IP CONSULT TO PULMONOLOGY/SLEEP MEDICINE    DISCHARGE MEDICATIONS   New, changed, or stopped medications from this admission:       Medication List      START taking these medications    guaiFENesin 600 mg 12 hr tablet  Commonly known as:  MUCINEX  Take 1 tablet (600 mg total) by mouth 2 (two) times a day for 10 days.  Dose:  600 mg     predniSONE 10 mg tablet  Commonly known as:  DELTASONE  Take 5 tabs (50mg) daily for 2 days, then take 4 tabs (40mg) daily for 2 days. Continue to decrease by 1 tab (10mg) every 2 days until gone.        CONTINUE taking these medications    * albuterol 2.5 mg /3 mL (0.083 %) nebulizer solution  Take 2.5 mg by nebulization every 8 (eight) hours as needed for wheezing or shortness of breath.  Dose:  2.5 mg     * albuterol HFA 90 mcg/actuation inhaler  Commonly known as:  VENTOLIN HFA  Inhale 2 puffs every 4 (four) hours as needed for wheezing.  Dose:  2 puff     alendronate 70 mg tablet  Commonly known as:  FOSAMAX  Take 70 mg by mouth once a week on Friday.  Dose:  70 mg     amLODIPine 5 mg tablet  Commonly known as:  NORVASC  Take 5 mg by mouth daily.  Dose:  5 mg     atorvastatin 10 mg tablet  Commonly known as:  LIPITOR  Take 10 mg by mouth daily.  Dose:  10 mg     budesonide 0.5 mg/2 mL nebulizer solution  Commonly known as:  PULMICORT  Take 0.5 mg by nebulization 2 (two) times a day. Rinse mouth with water after use to reduce aftertaste and incidence of candidiasis. Do not swallow.  Dose:  0.5 mg     carvedilol 3.125 mg tablet  Commonly known as:  COREG  Take 3.125 mg by mouth 2 (two) times a day with meals.  Dose:  3.125 mg     dilTIAZem  mg 24 hr capsule  Commonly known as:  CARDIZEM CD  Take 120 mg by mouth daily.  Dose:  120 mg     escitalopram 10 mg tablet  Commonly known as:  LEXAPRO  Take 10 mg by mouth daily.  Dose:  10 mg     famotidine 40 mg tablet  Commonly known as:  PEPCID  Take 40 mg by mouth 2 times daily.  Dose:  40 mg     fexofenadine 180  mg tablet  Commonly known as:  ALLEGRA  Take 180 mg by mouth daily.  Dose:  180 mg     finasteride 5 mg tablet  Commonly known as:  PROSCAR  Take 5 mg by mouth daily.  Dose:  5 mg     formoterol 20 mcg/2 mL nebulizer solution  Commonly known as:  PERFOROMIST  Take 20 mcg by nebulization 2 (two) times a day.  Dose:  20 mcg     LORazepam 0.5 mg tablet  Commonly known as:  ATIVAN  Take 0.5 mg by mouth daily as needed for anxiety.  Dose:  0.5 mg     montelukast 10 mg tablet  Commonly known as:  SINGULAIR  Take 10 mg by mouth nightly.  Dose:  10 mg     omeprazole 20 mg capsule  Commonly known as:  PriLOSEC  Take 20 mg by mouth daily before breakfast.  Dose:  20 mg     SPIRIVA WITH HANDIHALER 18 mcg per inhalation capsule  Place 18 mcg into inhaler and inhale daily.  Dose:  18 mcg  Generic drug:  tiotropium     tamsulosin 0.4 mg capsule  Commonly known as:  FLOMAX  Take 0.4 mg by mouth 2 times daily.  Dose:  0.4 mg             PROCEDURES / LABS / IMAGING      Pertinent Imaging  X-ray Chest 2 Views    Result Date: 12/30/2019  IMPRESSION: No acute cardiopulmonary findings. COMMENT: The current two view examination of the chest was compared to that dated 12/27/2019. The lungs remain free of active infiltration and congestion.  There is chronic right upper lobe pleural-parenchymal scarring. No new atelectasis or pleural effusions are present. The lungs remain hyperexpanded with changes of COPD. The cardiomediastinal silhouette is within normal limits of size and configuration. The bony thorax remains unchanged.  A mid dorsal vertebral body compression fracture remains unchanged since 4/11/2019.    X-ray Chest 1 View    Result Date: 12/27/2019  IMPRESSION: No definite evidence of consolidative airspace opacity to suggest pneumonia.     X-ray Chest 1 View    Result Date: 12/23/2019  IMPRESSION: No definite lung consolidation.      OUTPATIENT  FOLLOW-UP / REFERRALS / PENDING TESTS      Outpatient Follow-Up Appointments             [facility] [place] [facility]    In 3 days [facility] [place] [facility]    In 5 days [facility] [place] [facility]    In 1 week [facility] [place] [facility]    In 1 week [facility] [place] [facility]    In 1 week [facility] [place] [facility]    In 2 weeks [facility] [place] [facility]    In 2 weeks [facility] [place] [facility] Re[facility]    In 2 weeks [facility] [place] [facility]    In 3 weeks [facility] [place] [facility]    In 3 weeks [facility] [place] [facility]    In 3 weeks [facility] [place] [facility]    In 2 months [facility] [place] [facility] IP4 [facility] Radiology CAT Scan          Referrals  No orders of the defined types were placed in this encounter.      Test Results Pending at Discharge  Unresulted Labs (From admission, onward)     Start     Ordered    12/25/19 0716  Sputum culture / smear Expectorated Sputum  Once      12/25/19 0716    12/25/19 0716  Sputum Gram Stain (Lab Only) Expectorated Sputum  PROCEDURE ONCE      12/25/19 0716    12/24/19 0600  Basic metabolic panel  Daily     Start Status   12/26/19 0600 Scheduled   12/27/19 0600 Scheduled   12/28/19 0600 Scheduled   12/29/19 0600 Scheduled   12/30/19 0600 Scheduled       12/23/19 1356    12/24/19 0600  CBC  Daily     Start Status   12/26/19 0600 Scheduled   12/27/19 0600 Scheduled   12/28/19 0600 Scheduled   12/29/19 0600 Scheduled   12/30/19 0600 Scheduled       12/23/19 1356                  DISCHARGE DISPOSITION      Disposition: Home     Code Status At Discharge: Full Code    Physician Order for Life-Sustaining Treatment Document Status      No documents found                 ATTENDING  DOCUMENTATION  ALSO SEE ATTENDING ATTESTATION SECTION OF NOTE

## 2019-12-25 NOTE — PLAN OF CARE
Problem: Adult Inpatient Plan of Care  Goal: Plan of Care Review  Outcome: Progressing  Flowsheets (Taken 12/24/2019 1933)  Progress: improving  Plan of Care Reviewed With: patient  Outcome Summary: Pt's O2 sat will remain between 92-93%; nebs and IV steroids will be given.

## 2019-12-25 NOTE — PLAN OF CARE
Problem: Adult Inpatient Plan of Care  Goal: Plan of Care Review  Outcome: Progressing  Flowsheets (Taken 12/25/2019 1351)  Progress: improving  Plan of Care Reviewed With: patient  Outcome Summary: nebs ATC, IV steroids     Problem: Adult Inpatient Plan of Care  Goal: Patient-Specific Goal (Individualization)  Outcome: Progressing  Flowsheets (Taken 12/25/2019 1351)  Patient-Specific Goals (Include Timeframe): for beathing to improve  Individualized Care Needs: encourage OOB activity  Anxieties, Fears or Concerns: concerned that breathing does not feel any better

## 2019-12-26 LAB
ANION GAP SERPL CALC-SCNC: 11 MEQ/L (ref 3–15)
BUN SERPL-MCNC: 29 MG/DL (ref 8–20)
CALCIUM SERPL-MCNC: 9 MG/DL (ref 8.9–10.3)
CHLORIDE SERPL-SCNC: 100 MEQ/L (ref 98–109)
CO2 SERPL-SCNC: 27 MEQ/L (ref 22–32)
CREAT SERPL-MCNC: 1.2 MG/DL
ERYTHROCYTE [DISTWIDTH] IN BLOOD BY AUTOMATED COUNT: 12.7 % (ref 11.6–14.4)
GFR SERPL CREATININE-BSD FRML MDRD: 58 ML/MIN/1.73M*2
GLUCOSE SERPL-MCNC: 155 MG/DL (ref 70–99)
HCT VFR BLDCO AUTO: 48.5 % (ref 40.1–51)
HGB BLD-MCNC: 16.2 G/DL
MCH RBC QN AUTO: 31.7 PG (ref 28–33.2)
MCHC RBC AUTO-ENTMCNC: 33.4 G/DL (ref 32.2–36.5)
MCV RBC AUTO: 94.9 FL (ref 83–98)
PDW BLD AUTO: 9.6 FL (ref 9.4–12.4)
PLATELET # BLD AUTO: 306 K/UL
POTASSIUM SERPL-SCNC: 5.3 MEQ/L (ref 3.6–5.1)
RBC # BLD AUTO: 5.11 M/UL (ref 4.5–5.8)
SODIUM SERPL-SCNC: 138 MEQ/L (ref 136–144)
WBC # BLD AUTO: 16.77 K/UL

## 2019-12-26 PROCEDURE — 36415 COLL VENOUS BLD VENIPUNCTURE: CPT | Performed by: INTERNAL MEDICINE

## 2019-12-26 PROCEDURE — 63600000 HC DRUGS/DETAIL CODE: Performed by: INTERNAL MEDICINE

## 2019-12-26 PROCEDURE — 25000000 HC PHARMACY GENERAL: Performed by: INTERNAL MEDICINE

## 2019-12-26 PROCEDURE — 21400000 HC ROOM AND CARE CCU/INTERMEDIATE

## 2019-12-26 PROCEDURE — 94667 MNPJ CHEST WALL 1ST: CPT

## 2019-12-26 PROCEDURE — 63700000 HC SELF-ADMINISTRABLE DRUG: Performed by: INTERNAL MEDICINE

## 2019-12-26 PROCEDURE — 97530 THERAPEUTIC ACTIVITIES: CPT | Mod: GP

## 2019-12-26 PROCEDURE — 99232 SBSQ HOSP IP/OBS MODERATE 35: CPT | Performed by: INTERNAL MEDICINE

## 2019-12-26 PROCEDURE — 80048 BASIC METABOLIC PNL TOTAL CA: CPT | Performed by: INTERNAL MEDICINE

## 2019-12-26 PROCEDURE — 85027 COMPLETE CBC AUTOMATED: CPT | Performed by: INTERNAL MEDICINE

## 2019-12-26 RX ORDER — IPRATROPIUM BROMIDE AND ALBUTEROL SULFATE 2.5; .5 MG/3ML; MG/3ML
3 SOLUTION RESPIRATORY (INHALATION) EVERY 6 HOURS
Status: DISCONTINUED | OUTPATIENT
Start: 2019-12-26 | End: 2020-01-05 | Stop reason: HOSPADM

## 2019-12-26 RX ORDER — AZITHROMYCIN 250 MG/1
250 TABLET, FILM COATED ORAL DAILY
Status: DISCONTINUED | OUTPATIENT
Start: 2019-12-26 | End: 2019-12-30

## 2019-12-26 RX ADMIN — FORMOTEROL FUMARATE DIHYDRATE 20 MCG: 20 SOLUTION RESPIRATORY (INHALATION) at 17:38

## 2019-12-26 RX ADMIN — HEPARIN SODIUM 5000 UNITS: 5000 INJECTION, SOLUTION INTRAVENOUS; SUBCUTANEOUS at 13:33

## 2019-12-26 RX ADMIN — METHYLPREDNISOLONE SODIUM SUCCINATE 40 MG: 40 INJECTION, POWDER, FOR SOLUTION INTRAMUSCULAR; INTRAVENOUS at 08:01

## 2019-12-26 RX ADMIN — AZITHROMYCIN 250 MG: 250 TABLET, FILM COATED ORAL at 18:16

## 2019-12-26 RX ADMIN — CETIRIZINE HYDROCHLORIDE 10 MG: 10 TABLET, FILM COATED ORAL at 21:23

## 2019-12-26 RX ADMIN — METHYLPREDNISOLONE SODIUM SUCCINATE 40 MG: 40 INJECTION, POWDER, FOR SOLUTION INTRAMUSCULAR; INTRAVENOUS at 18:17

## 2019-12-26 RX ADMIN — FINASTERIDE 5 MG: 5 TABLET, FILM COATED ORAL at 08:09

## 2019-12-26 RX ADMIN — HEPARIN SODIUM 5000 UNITS: 5000 INJECTION, SOLUTION INTRAVENOUS; SUBCUTANEOUS at 06:33

## 2019-12-26 RX ADMIN — FAMOTIDINE 20 MG: 20 TABLET ORAL at 08:09

## 2019-12-26 RX ADMIN — GUAIFENESIN 1200 MG: 600 TABLET, EXTENDED RELEASE ORAL at 19:51

## 2019-12-26 RX ADMIN — ESCITALOPRAM OXALATE 10 MG: 10 TABLET, FILM COATED ORAL at 08:09

## 2019-12-26 RX ADMIN — PANTOPRAZOLE SODIUM 20 MG: 20 TABLET, DELAYED RELEASE ORAL at 08:10

## 2019-12-26 RX ADMIN — CARVEDILOL 3.12 MG: 3.12 TABLET, FILM COATED ORAL at 08:08

## 2019-12-26 RX ADMIN — CARVEDILOL 3.12 MG: 3.12 TABLET, FILM COATED ORAL at 17:39

## 2019-12-26 RX ADMIN — BUDESONIDE 0.5 MG: 0.5 INHALANT ORAL at 06:34

## 2019-12-26 RX ADMIN — FORMOTEROL FUMARATE DIHYDRATE 20 MCG: 20 SOLUTION RESPIRATORY (INHALATION) at 07:57

## 2019-12-26 RX ADMIN — BUDESONIDE 0.5 MG: 0.5 INHALANT ORAL at 17:39

## 2019-12-26 RX ADMIN — MONTELUKAST 10 MG: 10 TABLET, FILM COATED ORAL at 21:23

## 2019-12-26 RX ADMIN — IPRATROPIUM BROMIDE 0.5 MG: 0.5 SOLUTION RESPIRATORY (INHALATION) at 06:33

## 2019-12-26 RX ADMIN — ATORVASTATIN CALCIUM 10 MG: 10 TABLET, FILM COATED ORAL at 08:09

## 2019-12-26 RX ADMIN — HEPARIN SODIUM 5000 UNITS: 5000 INJECTION, SOLUTION INTRAVENOUS; SUBCUTANEOUS at 21:23

## 2019-12-26 RX ADMIN — IPRATROPIUM BROMIDE AND ALBUTEROL SULFATE 3 ML: .5; 3 SOLUTION RESPIRATORY (INHALATION) at 17:38

## 2019-12-26 RX ADMIN — IPRATROPIUM BROMIDE AND ALBUTEROL SULFATE 3 ML: .5; 3 SOLUTION RESPIRATORY (INHALATION) at 09:55

## 2019-12-26 RX ADMIN — AMLODIPINE BESYLATE 10 MG: 10 TABLET ORAL at 08:08

## 2019-12-26 RX ADMIN — TAMSULOSIN HYDROCHLORIDE 0.4 MG: 0.4 CAPSULE ORAL at 08:09

## 2019-12-26 RX ADMIN — GUAIFENESIN 1200 MG: 600 TABLET, EXTENDED RELEASE ORAL at 08:08

## 2019-12-26 ASSESSMENT — COGNITIVE AND FUNCTIONAL STATUS - GENERAL
WALKING IN HOSPITAL ROOM: 3 - A LITTLE
STANDING UP FROM CHAIR USING ARMS: 4 - NONE
CLIMB 3 TO 5 STEPS WITH RAILING: 3 - A LITTLE
MOVING TO AND FROM BED TO CHAIR: 4 - NONE

## 2019-12-26 NOTE — PLAN OF CARE
Problem: Adult Inpatient Plan of Care  Goal: Plan of Care Review  Outcome: Progressing  Flowsheets (Taken 12/26/2019 1154)  Progress: improving  Plan of Care Reviewed With: patient  Outcome Summary: Q6 nebs     Problem: Adult Inpatient Plan of Care  Goal: Patient-Specific Goal (Individualization)  Outcome: Progressing

## 2019-12-26 NOTE — PLAN OF CARE
Problem: Adult Inpatient Plan of Care  Goal: Plan of Care Review  Outcome: Progressing     Problem: Adult Inpatient Plan of Care  Goal: Readiness for Transition of Care  Outcome: Progressing     Problem: Adult Inpatient Plan of Care  Goal: Readiness for Transition of Care  Intervention: Mutually Develop Transition Plan  Flowsheets  Taken 12/24/2019 1144 by Leona Rincon LSW  Anticipated Discharge Disposition: home with home health services;home with assistance  Equipment Needed After Discharge: none  Readmission Within the Last 30 Days: no previous admission in last 30 days  Patient/Family Anticipated Services at Transition: home health care  Patient/Family Anticipates Transition to: home with family  Transportation Anticipated: family or friend will provide  Concerns to be Addressed: care coordination/care conferences;discharge planning  Patient's Choice of Community Agency(s): Patient would like [facility] services.  Offered/Gave Vendor List: yes  Taken 12/24/2019 1145 by Lola Madrigal PT  Equipment Currently Used at Home: oxygen     Plan dc home tomorrow. Update given to [facility]

## 2019-12-26 NOTE — PATIENT CARE CONFERENCE
Care Progression Rounds Note  Date: 12/26/2019  Time: 12:57 PM     Patient Name: Zuhair Luong Jr     Medical Record Number: [id number]   YOB: 1935  Sex: Male      Room/Bed: 0152    Admitting Diagnosis: COPD exacerbation (CMS/HCC) [J44.1]   Admit Date/Time: 12/23/2019  9:35 AM    Primary Diagnosis: COPD exacerbation (CMS/HCC)  Principal Problem: COPD exacerbation (CMS/HCC)    GMLOS: pending  Anticipated Discharge Date: 12/27/2019    AM-PAC  Mobility Score: 22    Discharge Planning:  Living Arrangements: house  Anticipated Discharge Disposition: home with home health services, home with assistance    Barriers to Discharge:  Barriers to Discharge: Medical issues not resolved    Participants:  physical therapy, nursing, , physician, social work/services

## 2019-12-26 NOTE — PLAN OF CARE
Problem: Adult Inpatient Plan of Care  Goal: Plan of Care Review  Outcome: Progressing  Flowsheets (Taken 12/26/2019 1110)  Progress: improving  Plan of Care Reviewed With: patient  Outcome Summary: PT treatment complete

## 2019-12-26 NOTE — PLAN OF CARE
Problem: Adult Inpatient Plan of Care  Goal: Plan of Care Review  Outcome: Progressing  Flowsheets (Taken 12/25/2019 2003)  Progress: improving  Plan of Care Reviewed With: patient  Outcome Summary: Pt will remain free of fall during this shift.

## 2019-12-26 NOTE — PROGRESS NOTES
[facility]: Chart reviewed. HC referral completed for SN/PT/OT services with [facility]. Anne Marie Johnson RN [facility] [id number]

## 2019-12-26 NOTE — PROGRESS NOTES
Internal Medicine  Daily Progress Note       SUBJECTIVE   This is a 84 y.o. year-old male admitted on 2019 with COPD exacerbation (CMS/[facility]) [J44.1].    Patient seen and examined at bedside this morning. No acute events overnight. He notes improvement in SOB today however he feels like he still needs one more day in the hospital. No fever, chills, CP, abd pain, n/v, lightheadedness, and swelling.     OBJECTIVE      Vital signs in last 24 hours:  Temp:  [36.4 °C (97.5 °F)-37.1 °C (98.7 °F)] 37.1 °C (98.7 °F)  Heart Rate:  [] 107  Resp:  [18] 18  BP: (143-150)/(68-90) 150/90  Temp (24hrs), Av.8 °C (98.3 °F), Min:36.4 °C (97.5 °F), Max:37.1 °C (98.7 °F)    Intake/Output     None          PHYSICAL EXAMINATION      Physical Exam    Constitutional: He is oriented to person, place, and time. He appears well-developed and well-nourished. No distress.   Head: Normocephalic and atraumatic.   Eyes: Pupils are equal, round, and reactive to light. EOM are normal. No scleral icterus.   Neck: No JVD present.   Cardiovascular: Normal rate, regular rhythm, normal heart sounds and intact distal pulses.   Pulmonary/Chest: significantly improved b/l rales and wheezing  Abdominal: Soft. Bowel sounds are normal. He exhibits no distension.   Musculoskeletal: He exhibits no edema.   Neurological: He is alert and oriented to person, place, and time. No cranial nerve deficit.   Skin: Skin is warm and dry. No rash noted.   Psychiatric: He has a normal mood and affect.         LINES, CATHETERS, DRAINS, AIRWAYS, AND WOUNDS   Lines, Drains, Airways, Wounds:  Peripheral IV 19 Left Antecubital (Active)   Number of days: 3       Comments:      LABS / IMAGING / TELE      Labs  I have reviewed the patient's pertinent labs. Pertinent labs are within normal limits.    Imaging  Not applicable    ECG/Telemetry  I have independently reviewed the telemetry. No events for the last 24 hours.    ASSESSMENT AND PLAN      * COPD  exacerbation (CMS/[facility])  Assessment & Plan  No evidence of pneumonia on CXR  Dr. Montano is pulmonologist    - c/w methylprednisolone 40 mg IV BID - day 3  - scheduled ipratroprium nebs given tachycardia  - c/w budesonide nebs, LAMA  - c/w montelukast, H1 blocker for allergy triggers  - guaifenesin added  - will likely need to be discharged on prolonged steroid taper  - following sputum culture  - pulmonology following, recs appreciated    Hypertension  Assessment & Plan      - restart home meds amlodipine and carvedilol - uptitrate as needed given as he is on steroids  - apparently also on diltiazem, will hold for now and clarify    GERD (gastroesophageal reflux disease)  Assessment & Plan  - c/w H2 blocker, PPI in the setting of steroid use    History of prostate cancer  Assessment & Plan   - c/w finasteride, tamsulosin      Hyperlipidemia  Assessment & Plan  - c/w atorvastatin    Chronic kidney disease (CKD), stage III (moderate) (CMS/HCC)  Assessment & Plan  Cr appears at baseline ~1.3    - limit nephrotoxic meds  - trend BMP       VTE Assessment: Pad[place]    Code Status: Full Code  Estimated discharge date: 12/25/2019     ATTENDING DOCUMENTATION  ALSO SEE ATTENDING ATTESTATION SECTION OF NOTE

## 2019-12-26 NOTE — PROGRESS NOTES
Patient: Zuhair Luong Jr  Location: [place] [address]  MRN: [id number]  Today's date: 12/26/2019    Session ended c patient sitting in recliner, call bell in reach, tray table aside, immediate needs met, questions answered, and NSG aware of pt position and performance.    Hospital Course  Ed is a 84 y.o. male admitted on 12/23/2019 with COPD exacerbation (CMS/HCC) [J44.1]. Principal problem is COPD exacerbation (CMS/HCC).    Ed has a past medical history of COPD (chronic obstructive pulmonary disease) (CMS/HCC), Diverticulitis of colon, Emphysema lung (CMS/HCC), Hypertension, and Prostate cancer (CMS/HCC).    Therapy Pain/Vitals - 12/26/19 1018        Pain/Comfort/Sleep    Pain Charting Type  Pain Assessment     Preferred Pain Scale  number (Numeric Rating Pain Scale)     Pain Rating (0-10): Rest  0     Pain Rating (0-10): Activity  0        Vital Signs    Pulse  (!) 102     Heart Rate Source  Monitor     SpO2  94 %     Oxygen Therapy  Supplemental oxygen     O2 Delivery Method  Nasal cannula     O2 Flow Rate (L/min)  4 L/min           Prior Living Environment      Most Recent Value   Living Arrangements  house   Living Environment Comment  lives with wife, 2SH, 1st fl set up, 1STE          Prior Level of Function      Most Recent Value   Ambulation  independent   Transferring  independent   Toileting  independent   Bathing  independent   Dressing  independent   Eating  independent   Communication  understands/communicates without difficulty   Swallowing  swallows foods/liquids without difficulty   Prior Level of Function Comment  ambs without AD, on home 2LNC O2   Equipment Currently Used at Home  oxygen          PT Evaluation and Treatment - 12/26/19 1018        Time Calculation    Start Time  1018     Stop Time  1032     Time Calculation (min)  14 min        Session Details    Document Type  daily treatment/progress note     Mode of Treatment  physical therapy        General  Information    Patient Profile Reviewed?  yes     General Observations of Patient  Pt received sitting in chair and agreeable to therapy     Existing Precautions/Restrictions  oxygen therapy device and L/min        Bed Mobility    Comment (Bed Mobility)  OOB at beginning and end of session        Sit to Stand Transfer    [place], Sit to Stand Transfer  modified independence     Assistive Device  none     Comment  UE support        Stand to Sit Transfer    [place], Stand to Sit Transfer  modified independence     Assistive Device  none     Comment  UE support        Gait Training    [place], Gait  supervision     Assistive Device  none     Distance in Feet  28 feet     Gait Pattern Utilized  step-through     Deviations/Abnormal Patterns (Gait)  gait speed decreased;stride length decreased     Comment  Pt becomes SOB very quickly and requested to sit at 14' of ambulation. Pt with 3 minute rest break. SPO2 at 90% and improved to 93% while sitting c PLB.         Stairs Training    [place], Stairs  supervision     Assistive Device  railing     Handrail Location  right side (ascending)     Number of Stairs  2     Ascending Stairs Technique  step-to-step     Descending Stairs Technique  step-to-step     Comment  Step stool in room. S for safety. Pt went up and over step stool 2x throughout session with good control and no LOB.         AM-PAC (TM) - Mobility (Current Function)    Turning from your back to your side while in a flat bed without using bedrails?  4 - None     Moving from lying on your back to sitting on the side of a flat bed without using bedrails?  4 - None     Moving to and from a bed to a chair?  4 - None     Standing up from a chair using your arms?  4 - None     To walk in a hospital room?  3 - A Little     Climbing 3-5 steps with a railing?  3 - A Little     AM-PAC (TM) Mobility Score  22        Therapy Assessment/Plan (PT)    Rehab Potential (PT)  good, to achieve stated therapy  goals     Therapy Frequency (PT)  3 times/wk        Progress Summary (PT)    Symptoms Noted During/After Treatment  fatigue;shortness of breath     Daily Outcome Statement (PT)  Pt continues to be limited by SOB and fatigue. Pt able to tolerate stairs and was able to increase ambulation a short distance. Pt with dec'd endruance. PT will continue to follow acutely to promote safe functional mobility and address deficits.         Therapy Plan Review/Discharge Plan (PT)    Anticipated Equipment Needs at Discharge (PT Eval)  none     PT Recommended Discharge Disposition  home with assist        Plan of Care Review    Plan of Care Reviewed With  patient           Pain Assessment/Intervention  Pain Charting Type: Pain Assessment             Education provided this session. See the Patient Education summary report for full details.    PT Goals      Most Recent Value   Gait Training Goal 1   Activity/Assistive Device  gait (walking locomotion) at 12/24/2019 1145   [place]  independent at 12/24/2019 1145   Distance  150 at 12/24/2019 1145   Time Frame  by discharge at 12/24/2019 1145   Progress/Outcome  goal ongoing at 12/26/2019 1018   Stairs Goal 1   Activity/Assistive Device  stairs, all skills at 12/24/2019 1145   [place]  modified independence at 12/24/2019 1145   Number of Stairs  12 at 12/24/2019 1145   Time Frame  by discharge at 12/24/2019 1145   Progress/Outcome  goal ongoing at 12/26/2019 1018

## 2019-12-26 NOTE — PROGRESS NOTES
"   Pulmonary Progress Note       SUBJECTIVE   Patient seen and examined at bedside. No acute events overnight. Starting to feel better, but still some shortness of breath.      OBJECTIVE        Vital Signs:   Visit Vitals  BP (!) 154/77 (BP Location: Left upper arm, Patient Position: Lying)   Pulse (!) 107   Temp 36.4 °C (97.6 °F) (Oral)   Resp 18   Ht 1.727 m (5' 8\")   Wt 78.9 kg (174 lb)   SpO2 93%   BMI 26.46 kg/m²       I/O's:    Intake/Output Summary (Last 24 hours) at 12/26/2019 1301  Last data filed at 12/26/2019 1000  Gross per 24 hour   Intake 360 ml   Output --   Net 360 ml       Medications:    Reviewed. Pertinent medications as below.    • amLODIPine  10 mg oral Daily   • atorvastatin  10 mg oral Daily   • budesonide  0.5 mg nebulization BID (6a, 6p)   • carvedilol  3.125 mg oral BID with meals   • cetirizine  10 mg oral Nightly   • escitalopram  10 mg oral Daily   • famotidine  20 mg oral Daily   • finasteride  5 mg oral Daily   • formoterol  20 mcg nebulization BID (6a, 6p)   • guaiFENesin  1,200 mg oral BID   • heparin (porcine)  5,000 Units subcutaneous q8h ASAEL   • ipratropium-albuterol  3 mL nebulization q6h ASAEL   • methylPREDNISolone sodium succinate  40 mg intravenous q12h ASAEL   • montelukast  10 mg oral Nightly   • pantoprazole  20 mg oral Daily   • tamsulosin  0.4 mg oral Daily           PHYSICAL EXAMINATION        General: The patient is in no acute distress.  Resting comfortably in bed.  HEENT: Mucous membranes are moist.  Sclera are anicteric.  Neck: Supple.  No cervical lymphadenopathy  Cardiovascular: S1 and S2 are present.  There are no murmurs.  Lungs: bilateral wheezing and rhonchi  Abdomen: Soft, nontender and nondistended  Extremities: Cool and dry without edema  Neuro: Awake and alert.  Spontaneously moving all extremities       Diagnostic Data      Labs:    I have personally reviewed all pertinent patient laboratory results. Labs of note discussed below:    ABG Results       12/23/19 "                          1154           Source Of Oxygen bipap                         CMP Results       12/26/19 12/25/19 12/24/19                    0703 0627 0651          140 137         K 5.3 4.7 4.7         Cl 100 102 100         CO2 27 29 27         Glucose 155 103 174         BUN 29 37 37         Creatinine 1.2 1.2 1.1         Calcium 9.0 9.0 8.8         Anion Gap 11 9 10         EGFR 58.0 58.0 >60.0                     CBC Results       12/26/19 12/25/19 12/24/19                    0703 0627 0651         WBC 16.77 17.92 15.73         RBC 5.11 4.80 4.80         HGB 16.2 15.2 15.3         HCT 48.5 45.3 45.5         MCV 94.9 94.4 94.8         MCH 31.7 31.7 31.9         MCHC 33.4 33.6 33.6          252 248                     Microbiology Results     Procedure Component Value Units Date/Time    Sputum Gram Stain (Lab Only) Expectorated Sputum [[id number]] Collected:  12/25/19 1049    Specimen:  Expectorated Sputum Updated:  12/25/19 1137     Gram Stain Result 2+ WBC      1+ Epithelial cells      4+ Gram positive cocci in pairs      2+ Gram positive bacilli      2+ Gram negative bacilli    Sputum Culture (Lab Only) Expectorated Sputum [[id number]]  (Normal) Collected:  12/25/19 1049    Specimen:  Expectorated Sputum Updated:  12/26/19 0713     Culture Normal Ruchi    RSV and Influenza Nucleic Acids, PCR Nasopharyngeal Swab [[id number]]  (Normal) Collected:  12/23/19 1159    Specimen:  Nasopharyngeal Swab Updated:  12/23/19 1253     Influenza A Negative     Influenza B Negative     Respiratory Syncytial Virus Negative        Imaging:    I have reviewed all pertinent imaging which is discussed below.    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

## 2019-12-27 ENCOUNTER — APPOINTMENT (INPATIENT)
Dept: RADIOLOGY | Facility: HOSPITAL | Age: 84
DRG: 190 | End: 2019-12-27
Attending: STUDENT IN AN ORGANIZED HEALTH CARE EDUCATION/TRAINING PROGRAM
Payer: COMMERCIAL

## 2019-12-27 LAB
ANION GAP SERPL CALC-SCNC: 9 MEQ/L (ref 3–15)
BUN SERPL-MCNC: 34 MG/DL (ref 8–20)
CALCIUM SERPL-MCNC: 8.9 MG/DL (ref 8.9–10.3)
CHLORIDE SERPL-SCNC: 100 MEQ/L (ref 98–109)
CO2 SERPL-SCNC: 28 MEQ/L (ref 22–32)
CREAT SERPL-MCNC: 1.2 MG/DL
ERYTHROCYTE [DISTWIDTH] IN BLOOD BY AUTOMATED COUNT: 12.6 % (ref 11.6–14.4)
FLUAV RNA SPEC QL NAA+PROBE: NEGATIVE
FLUBV RNA SPEC QL NAA+PROBE: NEGATIVE
GFR SERPL CREATININE-BSD FRML MDRD: 58 ML/MIN/1.73M*2
GLUCOSE SERPL-MCNC: 190 MG/DL (ref 70–99)
HADV DNA SPEC QL NAA+PROBE: NEGATIVE
HCOV 229E RNA SPEC QL NAA+PROBE: NEGATIVE
HCOV HKU1 RNA SPEC QL NAA+PROBE: NEGATIVE
HCOV NL63 RNA SPEC QL NAA+PROBE: NEGATIVE
HCOV OC43 RNA SPEC QL NAA+PROBE: NEGATIVE
HCT VFR BLDCO AUTO: 45.2 % (ref 40.1–51)
HGB BLD-MCNC: 15.2 G/DL
HMPV RNA SPEC QL NAA+PROBE: NEGATIVE
HPIV1 RNA SPEC QL NAA+PROBE: NEGATIVE
HPIV2 RNA SPEC QL NAA+PROBE: NEGATIVE
HPIV3 RNA SPEC QL NAA+PROBE: NEGATIVE
HPIV4 RNA SPEC QL NAA+PROBE: NEGATIVE
MCH RBC QN AUTO: 31.3 PG (ref 28–33.2)
MCHC RBC AUTO-ENTMCNC: 33.6 G/DL (ref 32.2–36.5)
MCV RBC AUTO: 93 FL (ref 83–98)
MICROORGANISM SPEC CULT: NORMAL
PDW BLD AUTO: 9.5 FL (ref 9.4–12.4)
PLATELET # BLD AUTO: 278 K/UL
POTASSIUM SERPL-SCNC: 4.8 MEQ/L (ref 3.6–5.1)
RBC # BLD AUTO: 4.86 M/UL (ref 4.5–5.8)
RSV RNA SPEC QL NAA+PROBE: NEGATIVE
RV+EV RNA SPEC QL NAA+PROBE: NEGATIVE
SODIUM SERPL-SCNC: 137 MEQ/L (ref 136–144)
TEST PERFORMANCE INFO SPEC: NORMAL
WBC # BLD AUTO: 17.47 K/UL

## 2019-12-27 PROCEDURE — 21400000 HC ROOM AND CARE CCU/INTERMEDIATE

## 2019-12-27 PROCEDURE — 63700000 HC SELF-ADMINISTRABLE DRUG: Performed by: INTERNAL MEDICINE

## 2019-12-27 PROCEDURE — 25000000 HC PHARMACY GENERAL: Performed by: INTERNAL MEDICINE

## 2019-12-27 PROCEDURE — 85027 COMPLETE CBC AUTOMATED: CPT | Performed by: INTERNAL MEDICINE

## 2019-12-27 PROCEDURE — 63600000 HC DRUGS/DETAIL CODE: Performed by: INTERNAL MEDICINE

## 2019-12-27 PROCEDURE — 63700000 HC SELF-ADMINISTRABLE DRUG: Performed by: STUDENT IN AN ORGANIZED HEALTH CARE EDUCATION/TRAINING PROGRAM

## 2019-12-27 PROCEDURE — 71045 X-RAY EXAM CHEST 1 VIEW: CPT

## 2019-12-27 PROCEDURE — 80048 BASIC METABOLIC PNL TOTAL CA: CPT | Performed by: INTERNAL MEDICINE

## 2019-12-27 PROCEDURE — 87633 RESP VIRUS 12-25 TARGETS: CPT | Performed by: INTERNAL MEDICINE

## 2019-12-27 PROCEDURE — 94667 MNPJ CHEST WALL 1ST: CPT

## 2019-12-27 PROCEDURE — 36415 COLL VENOUS BLD VENIPUNCTURE: CPT | Performed by: INTERNAL MEDICINE

## 2019-12-27 PROCEDURE — 99232 SBSQ HOSP IP/OBS MODERATE 35: CPT | Performed by: INTERNAL MEDICINE

## 2019-12-27 RX ORDER — DILTIAZEM HYDROCHLORIDE 120 MG/1
120 CAPSULE, COATED, EXTENDED RELEASE ORAL DAILY
Status: DISCONTINUED | OUTPATIENT
Start: 2019-12-27 | End: 2020-01-05 | Stop reason: HOSPADM

## 2019-12-27 RX ORDER — ALENDRONATE SODIUM 70 MG/1
70 TABLET ORAL WEEKLY
Status: DISCONTINUED | OUTPATIENT
Start: 2019-12-28 | End: 2020-01-05 | Stop reason: HOSPADM

## 2019-12-27 RX ORDER — FLUTICASONE PROPIONATE 50 MCG
2 SPRAY, SUSPENSION (ML) NASAL DAILY
Status: DISCONTINUED | OUTPATIENT
Start: 2019-12-27 | End: 2020-01-05 | Stop reason: HOSPADM

## 2019-12-27 RX ADMIN — MONTELUKAST 10 MG: 10 TABLET, FILM COATED ORAL at 21:42

## 2019-12-27 RX ADMIN — CARVEDILOL 3.12 MG: 3.12 TABLET, FILM COATED ORAL at 17:29

## 2019-12-27 RX ADMIN — DILTIAZEM HYDROCHLORIDE 120 MG: 120 CAPSULE, COATED, EXTENDED RELEASE ORAL at 13:25

## 2019-12-27 RX ADMIN — METHYLPREDNISOLONE SODIUM SUCCINATE 40 MG: 40 INJECTION, POWDER, FOR SOLUTION INTRAMUSCULAR; INTRAVENOUS at 18:09

## 2019-12-27 RX ADMIN — AZITHROMYCIN 250 MG: 250 TABLET, FILM COATED ORAL at 08:35

## 2019-12-27 RX ADMIN — IPRATROPIUM BROMIDE AND ALBUTEROL SULFATE 3 ML: .5; 3 SOLUTION RESPIRATORY (INHALATION) at 17:30

## 2019-12-27 RX ADMIN — CETIRIZINE HYDROCHLORIDE 10 MG: 10 TABLET, FILM COATED ORAL at 21:42

## 2019-12-27 RX ADMIN — METHYLPREDNISOLONE SODIUM SUCCINATE 40 MG: 40 INJECTION, POWDER, FOR SOLUTION INTRAMUSCULAR; INTRAVENOUS at 00:06

## 2019-12-27 RX ADMIN — IPRATROPIUM BROMIDE AND ALBUTEROL SULFATE 3 ML: .5; 3 SOLUTION RESPIRATORY (INHALATION) at 12:13

## 2019-12-27 RX ADMIN — GUAIFENESIN 1200 MG: 600 TABLET, EXTENDED RELEASE ORAL at 20:06

## 2019-12-27 RX ADMIN — HEPARIN SODIUM 5000 UNITS: 5000 INJECTION, SOLUTION INTRAVENOUS; SUBCUTANEOUS at 21:42

## 2019-12-27 RX ADMIN — FLUTICASONE PROPIONATE 2 SPRAY: 50 SPRAY, METERED NASAL at 12:13

## 2019-12-27 RX ADMIN — AMLODIPINE BESYLATE 10 MG: 10 TABLET ORAL at 08:36

## 2019-12-27 RX ADMIN — ESCITALOPRAM OXALATE 10 MG: 10 TABLET, FILM COATED ORAL at 08:36

## 2019-12-27 RX ADMIN — BUDESONIDE 0.5 MG: 0.5 INHALANT ORAL at 17:29

## 2019-12-27 RX ADMIN — FORMOTEROL FUMARATE DIHYDRATE 20 MCG: 20 SOLUTION RESPIRATORY (INHALATION) at 05:55

## 2019-12-27 RX ADMIN — IPRATROPIUM BROMIDE AND ALBUTEROL SULFATE 3 ML: .5; 3 SOLUTION RESPIRATORY (INHALATION) at 00:05

## 2019-12-27 RX ADMIN — GUAIFENESIN 1200 MG: 600 TABLET, EXTENDED RELEASE ORAL at 08:36

## 2019-12-27 RX ADMIN — ATORVASTATIN CALCIUM 10 MG: 10 TABLET, FILM COATED ORAL at 08:36

## 2019-12-27 RX ADMIN — TAMSULOSIN HYDROCHLORIDE 0.4 MG: 0.4 CAPSULE ORAL at 08:36

## 2019-12-27 RX ADMIN — HEPARIN SODIUM 5000 UNITS: 5000 INJECTION, SOLUTION INTRAVENOUS; SUBCUTANEOUS at 13:25

## 2019-12-27 RX ADMIN — IPRATROPIUM BROMIDE AND ALBUTEROL SULFATE 3 ML: .5; 3 SOLUTION RESPIRATORY (INHALATION) at 05:55

## 2019-12-27 RX ADMIN — BUDESONIDE 0.5 MG: 0.5 INHALANT ORAL at 05:55

## 2019-12-27 RX ADMIN — METHYLPREDNISOLONE SODIUM SUCCINATE 40 MG: 40 INJECTION, POWDER, FOR SOLUTION INTRAMUSCULAR; INTRAVENOUS at 12:13

## 2019-12-27 RX ADMIN — CARVEDILOL 3.12 MG: 3.12 TABLET, FILM COATED ORAL at 08:36

## 2019-12-27 RX ADMIN — HEPARIN SODIUM 5000 UNITS: 5000 INJECTION, SOLUTION INTRAVENOUS; SUBCUTANEOUS at 05:55

## 2019-12-27 RX ADMIN — FAMOTIDINE 20 MG: 20 TABLET ORAL at 08:36

## 2019-12-27 RX ADMIN — FINASTERIDE 5 MG: 5 TABLET, FILM COATED ORAL at 08:36

## 2019-12-27 RX ADMIN — PANTOPRAZOLE SODIUM 20 MG: 20 TABLET, DELAYED RELEASE ORAL at 08:36

## 2019-12-27 RX ADMIN — METHYLPREDNISOLONE SODIUM SUCCINATE 40 MG: 40 INJECTION, POWDER, FOR SOLUTION INTRAMUSCULAR; INTRAVENOUS at 05:55

## 2019-12-27 RX ADMIN — FORMOTEROL FUMARATE DIHYDRATE 20 MCG: 20 SOLUTION RESPIRATORY (INHALATION) at 17:30

## 2019-12-27 NOTE — PROGRESS NOTES
Pulmonary Progress Note    Subjective    Interval History: No acute events noted overnight.  Patient currently short of breath but just got back from the bathroom.    Objective    Vital signs over last 24 hours:  Temp:  [36.4 °C (97.5 °F)-36.9 °C (98.5 °F)] 36.5 °C (97.7 °F)  Heart Rate:  [] 92  Resp:  [18-20] 20  BP: (120-184)/(70-95) 184/95      Intake/Output Summary (Last 24 hours) at 12/27/2019 1251  Last data filed at 12/26/2019 1400  Gross per 24 hour   Intake 120 ml   Output --   Net 120 ml       Physical Exam:  General: Awake and alert, mild to moderate respiratory distress after returning from the bathroom  Neck: no JVD, no LAD  Lungs: Bilateral expiratory wheezes  Heart: RRR, S1S2 normal, no murmurs  Abdomen: soft, NTND, BS normal  Extremities: no clubbing or cyanosis, no LE edema  Neuro: grossly normal, moving all extremities    Labs  Lab Results   Component Value Date    WBC 17.47 (H) 12/27/2019    HGB 15.2 12/27/2019    HCT 45.2 12/27/2019     12/27/2019    ALT 14 (L) 11/13/2018    AST 15 11/13/2018     12/27/2019    K 4.8 12/27/2019     12/27/2019    CREATININE 1.2 12/27/2019    BUN 34 (H) 12/27/2019    CO2 28 12/27/2019    INR 1.0 08/21/2017    HGBA1C 6.3 (H) 11/03/2016    MICROALBUR 12.3 10/25/2019       Assessment     Mr. 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    1.  Acute COPD exacerbation- no clear trigger at this point but possibly due to prior tracheobronchitis    2.  History of dres syndrome    Plan    -Continue Solu-Medrol 40 mg every 6 hours for today  - Would continue azithromycin and complete a 5-day course, avoid vancomycin if needed for antibiotic coverage as he may have had a reaction to this in the past  - Continue nebulizers with  DuoNeb's every 6 hours  -Continue budesonide every 12 and Perforomist twice daily  -Continue Mucinex    David Santamaria MD  Pulmonary/Critical Care Fellow  PGY-6

## 2019-12-27 NOTE — PLAN OF CARE
Problem: Adult Inpatient Plan of Care  Goal: Plan of Care Review  Outcome: Progressing     Problem: Adult Inpatient Plan of Care  Goal: Readiness for Transition of Care  Outcome: Progressing     Problem: Adult Inpatient Plan of Care  Goal: Readiness for Transition of Care  Intervention: Mutually Develop Transition Plan  Flowsheets  Taken 12/24/2019 1144 by Leona Rincon LSW  Anticipated Discharge Disposition: home with home health services;home with assistance  Equipment Needed After Discharge: none  Readmission Within the Last 30 Days: no previous admission in last 30 days  Patient/Family Anticipated Services at Transition: home health care  Patient/Family Anticipates Transition to: home with family  Transportation Anticipated: family or friend will provide  Concerns to be Addressed: care coordination/care conferences;discharge planning  Patient's Choice of Community Agency(s): Patient would like [facility] services.  Offered/Gave Vendor List: yes  Taken 12/24/2019 1145 by Lola Madrigal PT  Equipment Currently Used at Home: oxygen     Continue iv steroids. Plan dc home on 12/30. Update given to [facility]

## 2019-12-27 NOTE — PROGRESS NOTES
Patient: Zuhair Luong Jr  Location: [place] [address]  MRN: [id number]  Today's date: 12/27/2019  THERAPIST: Asiya Walters OTR/L/  Lola Walters OTR/L    Attempted to see patient for therapy. Unable due to patient refused(lunch in progress/politely declines so he can eat).

## 2019-12-27 NOTE — PROGRESS NOTES
Internal Medicine  Daily Progress Note       SUBJECTIVE   This is a 84 y.o. year-old male admitted on 2019 with COPD exacerbation (CMS/HCC) [J44.1].    Patient seen and examined at bedside this morning. No acute events overnight. He feels like SOB is improving today compared to yesterday.     OBJECTIVE      Vital signs in last 24 hours:  Temp:  [36.4 °C (97.5 °F)-36.9 °C (98.5 °F)] 36.6 °C (97.8 °F)  Heart Rate:  [] 110  Resp:  [18-20] 20  BP: (120-154)/(70-93) 139/88  Temp (24hrs), Av.6 °C (97.9 °F), Min:36.4 °C (97.5 °F), Max:36.9 °C (98.5 °F)    Intake/Output     None          PHYSICAL EXAMINATION      Physical Exam    Constitutional: He is oriented to person, place, and time. He appears well-developed and well-nourished. No distress. Pleasant.  Head: Normocephalic and atraumatic.   Eyes: Pupils are equal, round, and reactive to light. EOM are normal. No scleral icterus.   Neck: No JVD present.   Cardiovascular: Normal rate, regular rhythm, normal heart sounds and intact distal pulses.   Pulmonary/Chest: persistent b/l rales   Abdominal: Soft. Bowel sounds are normal. He exhibits no distension.   Musculoskeletal: He exhibits no edema.   Neurological: He is alert and oriented to person, place, and time. No cranial nerve deficit.   Skin: Skin is warm and dry. No rash noted.   Psychiatric: He has a normal mood and affect.      LINES, CATHETERS, DRAINS, AIRWAYS, AND WOUNDS   Lines, Drains, Airways, Wounds:  Peripheral IV 19 Left Antecubital (Active)   Number of days: 4       Comments:      LABS / IMAGING / TELE      Labs  I have reviewed the patient's pertinent labs. Pertinent labs are within normal limits.    Imaging  I have independently reviewed the pertinent imaging from the last 24 hrs.    ECG/Telemetry  I have independently reviewed the telemetry. No events for the last 24 hours.    ASSESSMENT AND PLAN      * COPD exacerbation (CMS/HCC)  Assessment & Plan  No evidence of pneumonia on  CXR  Dr. Montano is pulmonologist    - c/w methylprednisolone 40 mg IV, changed to q6h - day 4  - azithromycin added - day 1  - scheduled Duonebs  - c/w budesonide nebs, LAMA  - c/w montelukast, H1 blocker for allergy triggers  - guaifenesin added  - will likely need to be discharged on prolonged steroid taper  - following sputum culture  - pulmonology following, recs appreciated    Hypertension  Assessment & Plan  - restart home meds amlodipine and carvedilol - uptitrate as needed given as he is on steroids      GERD (gastroesophageal reflux disease)  Assessment & Plan  - c/w H2 blocker, PPI in the setting of steroid use    History of prostate cancer  Assessment & Plan   - c/w finasteride, tamsulosin      Hyperlipidemia  Assessment & Plan  - c/w atorvastatin    Chronic kidney disease (CKD), stage III (moderate) (CMS/HCC)  Assessment & Plan  Cr appears at baseline ~1.3    - limit nephrotoxic meds  - trend BMP     VTE Assessment: Padua    Code Status: Full Code  Estimated discharge date: 2019     ATTENDING DOCUMENTATION  ALSO SEE ATTENDING ATTESTATION SECTION OF NOTE     I saw and evaluated the patient.  I discussed the case with the Resident and agree with the findings and plan as documented in the note except for my comments below or within the additional notes today.    Subjective:  He notes overnight into this morning having multiple episodes of being very slow to recover after minimal exertion. This morning coughing with a constant tickle in his throat/chest. No sputum production, fevers, chills, sweats, shakes, rashes, cp, palpitations, leg swelling, constipation, n/v/d.     Physical Exam:   Vitals:   Temp:  [36.4 °C (97.5 °F)-36.9 °C (98.5 °F)] 36.6 °C (97.8 °F)  Heart Rate:  [] 110  Resp:  [18-20] 20  BP: (120-154)/(70-93) 139/88  Temp (24hrs), Av.6 °C (97.9 °F), Min:36.4 °C (97.5 °F), Max:36.9 °C (98.5 °F)    Intake/Output     None        Mild resp distress this morning with minimal  conversation (worse than  yest am)  Speaking in 3-4 word sentences with conversational dyspnea.   Lungs coarse diffusely with end-exp wheezing. Dry, bronchospastic cough.   Heart tachy, regular   Abd obese, mildly distended  Legs without edema, warm   Skin no rashes.  Palpable distal pulses  Neuro intact, mood/psych appropriate      Labs/imaging:  Reviewed.     Results from last 7 days   Lab Units 12/27/19  0547   SODIUM mEQ/L 137   POTASSIUM mEQ/L 4.8   CHLORIDE mEQ/L 100   CO2 mEQ/L 28   BUN mg/dL 34*   CREATININE mg/dL 1.2   GLUCOSE mg/dL 190*   CALCIUM mg/dL 8.9          Results from last 7 days   Lab Units 12/27/19  0547   WBC K/uL 17.47*   HEMOGLOBIN g/dL 15.2   HEMATOCRIT % 45.2   PLATELETS K/uL 278          Telemetry/EKG:  Reviewed.    Assessment and plan:    # COPD on home O2: He continues to exacerbate. ?Reinfection vs. Recurrence. Continue steroids increase to 40q6, nebulized steroids, DuoNebs, perforomist, Mucinex.. Continue home montelukast, antihistamines. 5 days azithromycin. Check CXR this am. Check viral panel.   # Tachycardia: Improving albeit slowly. May be albuterol related. Monitor. Defer CTA given improvement with steroids.   # HTN: Continue home meds.   # CKD: Cr at baseline. Renally dose meds.  # Leukocytosis: Likely 2/2 steroids, stable. Monitor fever curve. Xray as above.     Rest per Dr. Coy's documentation.     RACHEL: 12/30     Bruno Eastman MD

## 2019-12-27 NOTE — PLAN OF CARE
Problem: Adult Inpatient Plan of Care  Goal: Plan of Care Review  Outcome: Progressing  Flowsheets (Taken 12/27/2019 0154)  Progress: improving  Plan of Care Reviewed With: patient  Outcome Summary: Patient verbalizes understanding of plan of care

## 2019-12-27 NOTE — PLAN OF CARE
Problem: Adult Inpatient Plan of Care  Goal: Plan of Care Review  Outcome: Progressing  Flowsheets (Taken 12/27/2019 1248)  Progress: improving  Plan of Care Reviewed With: patient  Outcome Summary: OOB to chair at times, chest xray and viral panel completed     Problem: Adult Inpatient Plan of Care  Goal: Patient-Specific Goal (Individualization)  Outcome: Progressing  Flowsheets (Taken 12/27/2019 1248)  Patient-Specific Goals (Include Timeframe): for breathing to improve  Individualized Care Needs: deep breathing encouraged, OOB to chair encouraged, frequent position changes encouraged  Anxieties, Fears or Concerns: concerned re: still having SOB and [place]

## 2019-12-27 NOTE — PATIENT CARE CONFERENCE
Care Progression Rounds Note  Date: 12/27/2019  Time: 11:35 AM     Patient Name: Zuhair Luong Jr     Medical Record Number: [id number]   YOB: 1935  Sex: Male      Room/Bed: 0152    Admitting Diagnosis: COPD exacerbation (CMS/HCC) [J44.1]   Admit Date/Time: 12/23/2019  9:35 AM    Primary Diagnosis: COPD exacerbation (CMS/HCC)  Principal Problem: COPD exacerbation (CMS/HCC)    GMLOS: 3.6  Anticipated Discharge Date: 12/30/2019    AM-PAC  Mobility Score: 22    Discharge Planning:  Living Arrangements: house  Anticipated Discharge Disposition: home with home health services, home with assistance    Barriers to Discharge:  Barriers to Discharge: Medical issues not resolved    Participants:  physician, , social work/services, nursing, physical therapy

## 2019-12-28 LAB
ANION GAP SERPL CALC-SCNC: 11 MEQ/L (ref 3–15)
BASOPHILS # BLD: 0.04 K/UL (ref 0.01–0.1)
BASOPHILS NFR BLD: 0.2 %
BUN SERPL-MCNC: 38 MG/DL (ref 8–20)
CALCIUM SERPL-MCNC: 8.8 MG/DL (ref 8.9–10.3)
CHLORIDE SERPL-SCNC: 98 MEQ/L (ref 98–109)
CO2 SERPL-SCNC: 29 MEQ/L (ref 22–32)
CREAT SERPL-MCNC: 1.2 MG/DL
DIFFERENTIAL METHOD BLD: ABNORMAL
EOSINOPHIL # BLD: 0 K/UL (ref 0.04–0.54)
EOSINOPHIL NFR BLD: 0 %
ERYTHROCYTE [DISTWIDTH] IN BLOOD BY AUTOMATED COUNT: 12.6 % (ref 11.6–14.4)
GFR SERPL CREATININE-BSD FRML MDRD: 58 ML/MIN/1.73M*2
GLUCOSE SERPL-MCNC: 168 MG/DL (ref 70–99)
HCT VFR BLDCO AUTO: 46.5 % (ref 40.1–51)
HGB BLD-MCNC: 15.4 G/DL
IMM GRANULOCYTES # BLD AUTO: 0.28 K/UL (ref 0–0.08)
IMM GRANULOCYTES NFR BLD AUTO: 1.5 %
LYMPHOCYTES # BLD: 0.38 K/UL (ref 1.2–3.5)
LYMPHOCYTES NFR BLD: 2 %
MCH RBC QN AUTO: 31.6 PG (ref 28–33.2)
MCHC RBC AUTO-ENTMCNC: 33.1 G/DL (ref 32.2–36.5)
MCV RBC AUTO: 95.3 FL (ref 83–98)
MONOCYTES # BLD: 0.63 K/UL (ref 0.3–1)
MONOCYTES NFR BLD: 3.3 %
NEUTROPHILS # BLD: 17.77 K/UL (ref 1.7–7)
NEUTS SEG NFR BLD: 93 %
NRBC BLD-RTO: 0 %
PDW BLD AUTO: 10.1 FL (ref 9.4–12.4)
PLATELET # BLD AUTO: 292 K/UL
POTASSIUM SERPL-SCNC: 5.1 MEQ/L (ref 3.6–5.1)
RBC # BLD AUTO: 4.88 M/UL (ref 4.5–5.8)
SODIUM SERPL-SCNC: 138 MEQ/L (ref 136–144)
WBC # BLD AUTO: 19.1 K/UL

## 2019-12-28 PROCEDURE — 63700000 HC SELF-ADMINISTRABLE DRUG: Performed by: INTERNAL MEDICINE

## 2019-12-28 PROCEDURE — 63600000 HC DRUGS/DETAIL CODE: Performed by: INTERNAL MEDICINE

## 2019-12-28 PROCEDURE — 80048 BASIC METABOLIC PNL TOTAL CA: CPT | Performed by: INTERNAL MEDICINE

## 2019-12-28 PROCEDURE — 85025 COMPLETE CBC W/AUTO DIFF WBC: CPT | Performed by: INTERNAL MEDICINE

## 2019-12-28 PROCEDURE — 36415 COLL VENOUS BLD VENIPUNCTURE: CPT | Performed by: INTERNAL MEDICINE

## 2019-12-28 PROCEDURE — 21400000 HC ROOM AND CARE CCU/INTERMEDIATE

## 2019-12-28 PROCEDURE — 99233 SBSQ HOSP IP/OBS HIGH 50: CPT | Performed by: HOSPITALIST

## 2019-12-28 PROCEDURE — 25000000 HC PHARMACY GENERAL: Performed by: INTERNAL MEDICINE

## 2019-12-28 RX ADMIN — IPRATROPIUM BROMIDE AND ALBUTEROL SULFATE 3 ML: .5; 3 SOLUTION RESPIRATORY (INHALATION) at 18:11

## 2019-12-28 RX ADMIN — CARVEDILOL 3.12 MG: 3.12 TABLET, FILM COATED ORAL at 08:49

## 2019-12-28 RX ADMIN — AZITHROMYCIN 250 MG: 250 TABLET, FILM COATED ORAL at 08:48

## 2019-12-28 RX ADMIN — TAMSULOSIN HYDROCHLORIDE 0.4 MG: 0.4 CAPSULE ORAL at 08:48

## 2019-12-28 RX ADMIN — PANTOPRAZOLE SODIUM 20 MG: 20 TABLET, DELAYED RELEASE ORAL at 08:48

## 2019-12-28 RX ADMIN — FORMOTEROL FUMARATE DIHYDRATE 20 MCG: 20 SOLUTION RESPIRATORY (INHALATION) at 18:27

## 2019-12-28 RX ADMIN — METHYLPREDNISOLONE SODIUM SUCCINATE 40 MG: 40 INJECTION, POWDER, FOR SOLUTION INTRAMUSCULAR; INTRAVENOUS at 18:13

## 2019-12-28 RX ADMIN — ALENDRONATE SODIUM 70 MG: 70 TABLET ORAL at 06:21

## 2019-12-28 RX ADMIN — GUAIFENESIN 1200 MG: 600 TABLET, EXTENDED RELEASE ORAL at 20:11

## 2019-12-28 RX ADMIN — HEPARIN SODIUM 5000 UNITS: 5000 INJECTION, SOLUTION INTRAVENOUS; SUBCUTANEOUS at 21:45

## 2019-12-28 RX ADMIN — HEPARIN SODIUM 5000 UNITS: 5000 INJECTION, SOLUTION INTRAVENOUS; SUBCUTANEOUS at 06:20

## 2019-12-28 RX ADMIN — MONTELUKAST 10 MG: 10 TABLET, FILM COATED ORAL at 21:45

## 2019-12-28 RX ADMIN — IPRATROPIUM BROMIDE AND ALBUTEROL SULFATE 3 ML: .5; 3 SOLUTION RESPIRATORY (INHALATION) at 23:34

## 2019-12-28 RX ADMIN — METHYLPREDNISOLONE SODIUM SUCCINATE 40 MG: 40 INJECTION, POWDER, FOR SOLUTION INTRAMUSCULAR; INTRAVENOUS at 23:34

## 2019-12-28 RX ADMIN — HEPARIN SODIUM 5000 UNITS: 5000 INJECTION, SOLUTION INTRAVENOUS; SUBCUTANEOUS at 14:18

## 2019-12-28 RX ADMIN — IPRATROPIUM BROMIDE AND ALBUTEROL SULFATE 3 ML: .5; 3 SOLUTION RESPIRATORY (INHALATION) at 00:06

## 2019-12-28 RX ADMIN — METHYLPREDNISOLONE SODIUM SUCCINATE 40 MG: 40 INJECTION, POWDER, FOR SOLUTION INTRAMUSCULAR; INTRAVENOUS at 12:50

## 2019-12-28 RX ADMIN — ESCITALOPRAM OXALATE 10 MG: 10 TABLET, FILM COATED ORAL at 08:48

## 2019-12-28 RX ADMIN — AMLODIPINE BESYLATE 10 MG: 10 TABLET ORAL at 08:49

## 2019-12-28 RX ADMIN — FORMOTEROL FUMARATE DIHYDRATE 20 MCG: 20 SOLUTION RESPIRATORY (INHALATION) at 06:20

## 2019-12-28 RX ADMIN — FLUTICASONE PROPIONATE 2 SPRAY: 50 SPRAY, METERED NASAL at 08:50

## 2019-12-28 RX ADMIN — FAMOTIDINE 20 MG: 20 TABLET ORAL at 08:48

## 2019-12-28 RX ADMIN — CETIRIZINE HYDROCHLORIDE 10 MG: 10 TABLET, FILM COATED ORAL at 21:45

## 2019-12-28 RX ADMIN — CARVEDILOL 3.12 MG: 3.12 TABLET, FILM COATED ORAL at 18:11

## 2019-12-28 RX ADMIN — METHYLPREDNISOLONE SODIUM SUCCINATE 40 MG: 40 INJECTION, POWDER, FOR SOLUTION INTRAMUSCULAR; INTRAVENOUS at 00:05

## 2019-12-28 RX ADMIN — BUDESONIDE 0.5 MG: 0.5 INHALANT ORAL at 18:12

## 2019-12-28 RX ADMIN — DILTIAZEM HYDROCHLORIDE 120 MG: 120 CAPSULE, COATED, EXTENDED RELEASE ORAL at 08:48

## 2019-12-28 RX ADMIN — GUAIFENESIN 1200 MG: 600 TABLET, EXTENDED RELEASE ORAL at 08:48

## 2019-12-28 RX ADMIN — IPRATROPIUM BROMIDE AND ALBUTEROL SULFATE 3 ML: .5; 3 SOLUTION RESPIRATORY (INHALATION) at 06:20

## 2019-12-28 RX ADMIN — METHYLPREDNISOLONE SODIUM SUCCINATE 40 MG: 40 INJECTION, POWDER, FOR SOLUTION INTRAMUSCULAR; INTRAVENOUS at 06:20

## 2019-12-28 RX ADMIN — ATORVASTATIN CALCIUM 10 MG: 10 TABLET, FILM COATED ORAL at 08:48

## 2019-12-28 RX ADMIN — BUDESONIDE 0.5 MG: 0.5 INHALANT ORAL at 06:20

## 2019-12-28 RX ADMIN — IPRATROPIUM BROMIDE AND ALBUTEROL SULFATE 3 ML: .5; 3 SOLUTION RESPIRATORY (INHALATION) at 12:50

## 2019-12-28 RX ADMIN — FINASTERIDE 5 MG: 5 TABLET, FILM COATED ORAL at 08:48

## 2019-12-28 NOTE — PROGRESS NOTES
"     Internal Medicine  Daily Progress Note       SUBJECTIVE   This is a 84 y.o. year-old male admitted on 2019 with COPD exacerbation (CMS/HCC) [J44.1].    Interval History: No events overnight. He says he got up to go to the bathroom and was short of breath. He continues to cough a lot, but denies any shortness of breath at rest while lying in bed.     OBJECTIVE      Vital signs in last 24 hours:  Temp:  [36.4 °C (97.5 °F)-37.2 °C (98.9 °F)] 36.4 °C (97.5 °F)  Heart Rate:  [] 86  Resp:  [20] 20  BP: (137-187)/(76-95) 144/86  Temp (24hrs), Av.7 °C (98 °F), Min:36.4 °C (97.5 °F), Max:37.2 °C (98.9 °F)    Intake/Output     None          PHYSICAL EXAMINATION      PHYSICAL EXAM  Vitals: Visit Vitals  BP (!) 144/86 (BP Location: Right upper arm, Patient Position: Lying)   Pulse 86   Temp 36.4 °C (97.5 °F) (Oral)   Resp 20   Ht 1.727 m (5' 8\")   Wt 75.3 kg (166 lb)   SpO2 94%   BMI 25.24 kg/m²      General: NAD, well-appearing   HEENT: Oropharyx clear and without exudates  No JVD  No cervical lymphadenopathy   Eyes: Conjunctiva clear  EOMI   Cardiac: RRR  No murmurs, rubs, or gallops   Pulmonary: Mild Wheezes heard b/l, rhonchi bilaterally, coughing during exam   Abdomen: Soft, non-tender, +BS  No rebound, no guarding   MSK: No joint deformity   Extremities: No peripheral edema   Neuro: AAO x 3, non-focal  CN II-XII intact  5/5 in all extremities   Psych: Appropriate, cooperative   Skin: No rashes            LINES, CATHETERS, DRAINS, AIRWAYS, AND WOUNDS   Lines, Drains, Airways, Wounds:  Peripheral IV 19 Left Antecubital (Active)   Number of days: 5       Comments:      LABS / IMAGING / TELE      Labs  BMP:  Results from last 7 days   Lab Units 19  0356 19  0547 19  0703   SODIUM mEQ/L 138 137 138   POTASSIUM mEQ/L 5.1 4.8 5.3*   CHLORIDE mEQ/L 98 100 100   CO2 mEQ/L 29 28 27   BUN mg/dL 38* 34* 29*   CREATININE mg/dL 1.2 1.2 1.2   GLUCOSE mg/dL 168* 190* 155*   CALCIUM mg/dL 8.8* " 8.9 9.0             LFT:  Lab Results   Component Value Date    ALT 14 (L) 11/13/2018    AST 15 11/13/2018     (H) 11/04/2016    ALKPHOS 66 11/13/2018    BILITOT 0.7 11/13/2018       CBC:  Results from last 7 days   Lab Units 12/28/19  0356 12/27/19  0547 12/26/19  0703  12/23/19  0947   WBC K/uL 19.10* 17.47* 16.77*   < > 12.75*   HEMOGLOBIN g/dL 15.4 15.2 16.2   < > 16.6   HEMATOCRIT % 46.5 45.2 48.5   < > 50.7   PLATELETS K/uL 292 278 306   < > 275   DIFF TYPE  Auto  --   --   --  Auto   NRBC % 0.0  --   --   --  0.0   IMM GRANULOCYTES % 1.5  --   --   --  0.6   NEUTROPHILS % 93.0  --   --   --  87.6   LYMPHOCYTES % 2.0  --   --   --  5.8   MONOCYTES % 3.3  --   --   --  5.5   EOSINOPHILS % 0.0  --   --   --  0.2   BASOPHILS % 0.2  --   --   --  0.3   IMM GRANUCOCYTES ABS K/uL 0.28*  --   --   --  0.08   MONO ABS AUTO K/uL 0.63  --   --   --  0.70   EOS ABS AUTO K/uL 0.00*  --   --   --  0.03*   BASO ABS AUTO K/uL 0.04  --   --   --  0.04    < > = values in this interval not displayed.           Imaging  Imaging reviewed.     ECG/Telemetry  Telemetry reviewed.     ASSESSMENT AND PLAN      GERD (gastroesophageal reflux disease)  Assessment & Plan  - c/w H2 blocker, PPI in the setting of steroid use    History of prostate cancer  Assessment & Plan   - c/w finasteride, tamsulosin      Hyperlipidemia  Assessment & Plan  - c/w atorvastatin    Hypertension  Assessment & Plan  - c/w amlodipine and carvedilol      Chronic kidney disease (CKD), stage III (moderate) (CMS/[facility])  Assessment & Plan  Cr appears at baseline ~1.3    - limit nephrotoxic meds  - trend BMP    * COPD exacerbation (CMS/[facility])  Assessment & Plan  No evidence of pneumonia on CXR  Dr. Montano is pulmonologist    - c/w methylprednisolone 40 mg IV q6h - day 6 of steroids  - azithromycin added - day 3/5  - scheduled Duonebs  - c/w budesonide nebs, LAMA  - c/w montelukast, H1 blocker for allergy triggers  - c/w guaifenesin   - sputum culture: normal  dago  - respiratory viral panel negative  - pulmonology following, recs appreciated       VTE Assessment: Padua    Code Status: Full Code  Estimated discharge date: 12/30/2019     ATTENDING DOCUMENTATION  ALSO SEE ATTENDING ATTESTATION SECTION OF NOTE

## 2019-12-28 NOTE — PLAN OF CARE
Problem: Adult Inpatient Plan of Care  Goal: Plan of Care Review  Outcome: Progressing  Goal: Patient-Specific Goal (Individualization)  Outcome: Progressing  Goal: Absence of Hospital-Acquired Illness or Injury  Outcome: Progressing  Goal: Optimal Comfort and Wellbeing  Outcome: Progressing  Goal: Readiness for Transition of Care  Outcome: Progressing  Goal: Rounds/Family Conference  Outcome: Progressing     Problem: Depression (Hospitalized Older Adult)  Goal: Depressive Symptoms Identified and Managed  Outcome: Progressing     Problem: Fluid Imbalance (Hospitalized Older Adult)  Goal: Fluid Balance  Outcome: Progressing     Problem: Functional Ability Impaired (Hospitalized Older Adult)  Goal: Optimal Functional Ability  Outcome: Progressing     Problem: Sleep Disturbance (Hospitalized Older Adult)  Goal: Adequate Sleep/Rest  Outcome: Progressing     Problem: Adjustment to Illness COPD (Chronic Obstructive Pulmonary Disease)  Goal: Optimal Chronic Illness Coping  Outcome: Progressing     Problem: Functional Ability Impaired COPD (Chronic Obstructive Pulmonary Disease)  Goal: Optimal Level of Functional [place]  Outcome: Progressing     Problem: Oral Intake Inadequate COPD (Chronic Obstructive Pulmonary Disease)  Goal: Improved Nutrition Intake  Outcome: Progressing     Problem: Infection COPD (Chronic Obstructive Pulmonary Disease)  Goal: Absence of Infection Signs/Symptoms  Outcome: Progressing     Problem: Respiratory Compromise COPD (Chronic Obstructive Pulmonary Disease)  Goal: Effective Oxygenation and Ventilation  Outcome: Progressing   Patient will continue on medications for respiratory symptoms

## 2019-12-28 NOTE — PLAN OF CARE
Problem: Adult Inpatient Plan of Care  Goal: Plan of Care Review  Outcome: Progressing  Flowsheets (Taken 12/27/2019 2239)  Progress: improving  Plan of Care Reviewed With: patient  Outcome Summary: Patient verbalizes understanding of plan of care

## 2019-12-29 LAB
ANION GAP SERPL CALC-SCNC: 11 MEQ/L (ref 3–15)
BASOPHILS # BLD: 0.04 K/UL (ref 0.01–0.1)
BASOPHILS NFR BLD: 0.2 %
BUN SERPL-MCNC: 44 MG/DL (ref 8–20)
CALCIUM SERPL-MCNC: 8.4 MG/DL (ref 8.9–10.3)
CHLORIDE SERPL-SCNC: 100 MEQ/L (ref 98–109)
CO2 SERPL-SCNC: 25 MEQ/L (ref 22–32)
CREAT SERPL-MCNC: 1.2 MG/DL
DIFFERENTIAL METHOD BLD: ABNORMAL
EOSINOPHIL # BLD: 0 K/UL (ref 0.04–0.54)
EOSINOPHIL NFR BLD: 0 %
ERYTHROCYTE [DISTWIDTH] IN BLOOD BY AUTOMATED COUNT: 12.7 % (ref 11.6–14.4)
GFR SERPL CREATININE-BSD FRML MDRD: 58 ML/MIN/1.73M*2
GLUCOSE SERPL-MCNC: 225 MG/DL (ref 70–99)
GRAM STN SPEC: NORMAL
HCT VFR BLDCO AUTO: 42.4 % (ref 40.1–51)
HGB BLD-MCNC: 14.4 G/DL
IMM GRANULOCYTES # BLD AUTO: 0.29 K/UL (ref 0–0.08)
IMM GRANULOCYTES NFR BLD AUTO: 1.6 %
LYMPHOCYTES # BLD: 0.29 K/UL (ref 1.2–3.5)
LYMPHOCYTES NFR BLD: 1.6 %
MCH RBC QN AUTO: 31.9 PG (ref 28–33.2)
MCHC RBC AUTO-ENTMCNC: 34 G/DL (ref 32.2–36.5)
MCV RBC AUTO: 94 FL (ref 83–98)
MONOCYTES # BLD: 0.6 K/UL (ref 0.3–1)
MONOCYTES NFR BLD: 3.4 %
NEUTROPHILS # BLD: 16.5 K/UL (ref 1.7–7)
NEUTS SEG NFR BLD: 93.2 %
NRBC BLD-RTO: 0 %
PDW BLD AUTO: 10 FL (ref 9.4–12.4)
PLATELET # BLD AUTO: 276 K/UL
POTASSIUM SERPL-SCNC: 4.7 MEQ/L (ref 3.6–5.1)
RBC # BLD AUTO: 4.51 M/UL (ref 4.5–5.8)
SODIUM SERPL-SCNC: 136 MEQ/L (ref 136–144)
WBC # BLD AUTO: 17.72 K/UL

## 2019-12-29 PROCEDURE — 63600000 HC DRUGS/DETAIL CODE: Performed by: INTERNAL MEDICINE

## 2019-12-29 PROCEDURE — 25000000 HC PHARMACY GENERAL: Performed by: INTERNAL MEDICINE

## 2019-12-29 PROCEDURE — 85025 COMPLETE CBC W/AUTO DIFF WBC: CPT | Performed by: INTERNAL MEDICINE

## 2019-12-29 PROCEDURE — 87205 SMEAR GRAM STAIN: CPT | Performed by: INTERNAL MEDICINE

## 2019-12-29 PROCEDURE — 36415 COLL VENOUS BLD VENIPUNCTURE: CPT | Performed by: INTERNAL MEDICINE

## 2019-12-29 PROCEDURE — 21400000 HC ROOM AND CARE CCU/INTERMEDIATE

## 2019-12-29 PROCEDURE — 63700000 HC SELF-ADMINISTRABLE DRUG: Performed by: INTERNAL MEDICINE

## 2019-12-29 PROCEDURE — 80048 BASIC METABOLIC PNL TOTAL CA: CPT | Performed by: INTERNAL MEDICINE

## 2019-12-29 PROCEDURE — 99233 SBSQ HOSP IP/OBS HIGH 50: CPT | Performed by: HOSPITALIST

## 2019-12-29 RX ADMIN — HEPARIN SODIUM 5000 UNITS: 5000 INJECTION, SOLUTION INTRAVENOUS; SUBCUTANEOUS at 22:01

## 2019-12-29 RX ADMIN — ESCITALOPRAM OXALATE 10 MG: 10 TABLET, FILM COATED ORAL at 08:03

## 2019-12-29 RX ADMIN — DILTIAZEM HYDROCHLORIDE 120 MG: 120 CAPSULE, COATED, EXTENDED RELEASE ORAL at 08:03

## 2019-12-29 RX ADMIN — ATORVASTATIN CALCIUM 10 MG: 10 TABLET, FILM COATED ORAL at 08:03

## 2019-12-29 RX ADMIN — AZITHROMYCIN 250 MG: 250 TABLET, FILM COATED ORAL at 08:03

## 2019-12-29 RX ADMIN — FLUTICASONE PROPIONATE 2 SPRAY: 50 SPRAY, METERED NASAL at 08:04

## 2019-12-29 RX ADMIN — FORMOTEROL FUMARATE DIHYDRATE 20 MCG: 20 SOLUTION RESPIRATORY (INHALATION) at 17:30

## 2019-12-29 RX ADMIN — PANTOPRAZOLE SODIUM 20 MG: 20 TABLET, DELAYED RELEASE ORAL at 08:04

## 2019-12-29 RX ADMIN — FORMOTEROL FUMARATE DIHYDRATE 20 MCG: 20 SOLUTION RESPIRATORY (INHALATION) at 06:16

## 2019-12-29 RX ADMIN — METHYLPREDNISOLONE SODIUM SUCCINATE 40 MG: 40 INJECTION, POWDER, FOR SOLUTION INTRAMUSCULAR; INTRAVENOUS at 12:47

## 2019-12-29 RX ADMIN — METHYLPREDNISOLONE SODIUM SUCCINATE 40 MG: 40 INJECTION, POWDER, FOR SOLUTION INTRAMUSCULAR; INTRAVENOUS at 17:30

## 2019-12-29 RX ADMIN — GUAIFENESIN 1200 MG: 600 TABLET, EXTENDED RELEASE ORAL at 20:02

## 2019-12-29 RX ADMIN — IPRATROPIUM BROMIDE AND ALBUTEROL SULFATE 3 ML: .5; 3 SOLUTION RESPIRATORY (INHALATION) at 12:47

## 2019-12-29 RX ADMIN — FAMOTIDINE 20 MG: 20 TABLET ORAL at 08:03

## 2019-12-29 RX ADMIN — IPRATROPIUM BROMIDE AND ALBUTEROL SULFATE 3 ML: .5; 3 SOLUTION RESPIRATORY (INHALATION) at 17:27

## 2019-12-29 RX ADMIN — HEPARIN SODIUM 5000 UNITS: 5000 INJECTION, SOLUTION INTRAVENOUS; SUBCUTANEOUS at 13:46

## 2019-12-29 RX ADMIN — GUAIFENESIN 1200 MG: 600 TABLET, EXTENDED RELEASE ORAL at 08:04

## 2019-12-29 RX ADMIN — FINASTERIDE 5 MG: 5 TABLET, FILM COATED ORAL at 08:04

## 2019-12-29 RX ADMIN — IPRATROPIUM BROMIDE AND ALBUTEROL SULFATE 3 ML: .5; 3 SOLUTION RESPIRATORY (INHALATION) at 06:13

## 2019-12-29 RX ADMIN — BUDESONIDE 0.5 MG: 0.5 INHALANT ORAL at 06:13

## 2019-12-29 RX ADMIN — MONTELUKAST 10 MG: 10 TABLET, FILM COATED ORAL at 22:01

## 2019-12-29 RX ADMIN — METHYLPREDNISOLONE SODIUM SUCCINATE 40 MG: 40 INJECTION, POWDER, FOR SOLUTION INTRAMUSCULAR; INTRAVENOUS at 23:41

## 2019-12-29 RX ADMIN — BUDESONIDE 0.5 MG: 0.5 INHALANT ORAL at 17:26

## 2019-12-29 RX ADMIN — CETIRIZINE HYDROCHLORIDE 10 MG: 10 TABLET, FILM COATED ORAL at 22:01

## 2019-12-29 RX ADMIN — IPRATROPIUM BROMIDE AND ALBUTEROL SULFATE 3 ML: .5; 3 SOLUTION RESPIRATORY (INHALATION) at 23:41

## 2019-12-29 RX ADMIN — CARVEDILOL 3.12 MG: 3.12 TABLET, FILM COATED ORAL at 17:26

## 2019-12-29 RX ADMIN — AMLODIPINE BESYLATE 10 MG: 10 TABLET ORAL at 08:02

## 2019-12-29 RX ADMIN — METHYLPREDNISOLONE SODIUM SUCCINATE 40 MG: 40 INJECTION, POWDER, FOR SOLUTION INTRAMUSCULAR; INTRAVENOUS at 06:12

## 2019-12-29 RX ADMIN — TAMSULOSIN HYDROCHLORIDE 0.4 MG: 0.4 CAPSULE ORAL at 08:04

## 2019-12-29 RX ADMIN — CARVEDILOL 3.12 MG: 3.12 TABLET, FILM COATED ORAL at 08:03

## 2019-12-29 RX ADMIN — HEPARIN SODIUM 5000 UNITS: 5000 INJECTION, SOLUTION INTRAVENOUS; SUBCUTANEOUS at 06:14

## 2019-12-29 NOTE — PLAN OF CARE
Problem: Adult Inpatient Plan of Care  Goal: Plan of Care Review  Outcome: Progressing  Flowsheets  Taken 12/28/2019 2024 by Roslyn Quinn RN  Progress: improving  Taken 12/29/2019 1111 by Serenity Scott RN  Plan of Care Reviewed With: patient  Outcome Summary: pt verbalizes understanding of plan of care     Problem: Adult Inpatient Plan of Care  Goal: Patient-Specific Goal (Individualization)  Outcome: Progressing  Flowsheets (Taken 12/29/2019 1111)  Patient-Specific Goals (Include Timeframe): for breathing to improve  Individualized Care Needs: fall safety, OOB to chair encouraged  Anxieties, Fears or Concerns: none

## 2019-12-29 NOTE — NURSING NOTE
Assumed care of pt. Pt seen and assessed, vs done and stable. Pt resting in bed and offers no c/o. Denies pain. Remains in SR HR 90. FSP maintained and call bell in reach. Will cont to monitor.

## 2019-12-29 NOTE — PROGRESS NOTES
Internal Medicine  Daily Progress Note       SUBJECTIVE   This is a 84 y.o. year-old male admitted on 2019 with COPD exacerbation (CMS/HCC) [J44.1].    Interval History: patient says that he had some shortness of breath after getting up and going to the bathroom. He still has some mild dyspnea when moving around in bed. He denies fever, chills.      OBJECTIVE      Vital signs in last 24 hours:  Temp:  [36.3 °C (97.3 °F)-36.4 °C (97.6 °F)] 36.4 °C (97.6 °F)  Heart Rate:  [80-91] 91  Resp:  [20] 20  BP: (139-157)/(72-78) 147/76  Temp (24hrs), Av.3 °C (97.4 °F), Min:36.3 °C (97.3 °F), Max:36.4 °C (97.6 °F)    Intake/Output     None          PHYSICAL EXAMINATION      Physical Exam   Constitutional: He is oriented to person, place, and time.   Elderly male in NAD   HENT:   Head: Normocephalic and atraumatic.   Eyes: Conjunctivae and EOM are normal.   Neck: Normal range of motion. Neck supple.   Cardiovascular: Normal rate, regular rhythm and normal heart sounds.   Pulmonary/Chest: Effort normal.   Course breath sounds diffusely throughout lung fields   Abdominal: Soft. Bowel sounds are normal.   Musculoskeletal: Normal range of motion.   Neurological: He is alert and oriented to person, place, and time.   Skin: Skin is warm and dry.   Psychiatric: He has a normal mood and affect.        LINES, CATHETERS, DRAINS, AIRWAYS, AND WOUNDS   Lines, Drains, Airways, Wounds:  Peripheral IV 19 Left Antecubital (Active)   Number of days: 6       Comments:      LABS / IMAGING / TELE      Labs  Results from last 7 days   Lab Units 19  0613 19  0356 19  0547   WBC K/uL 17.72* 19.10* 17.47*   HEMOGLOBIN g/dL 14.4 15.4 15.2   HEMATOCRIT % 42.4 46.5 45.2   PLATELETS K/uL 276 292 278     Results from last 7 days   Lab Units 19  0613 19  0356 19  0547   SODIUM mEQ/L 136 138 137   POTASSIUM mEQ/L 4.7 5.1 4.8   CHLORIDE mEQ/L 100 98 100   CO2 mEQ/L 25 29 28   BUN mg/dL 44* 38* 34*    CREATININE mg/dL 1.2 1.2 1.2   CALCIUM mg/dL 8.4* 8.8* 8.9   GLUCOSE mg/dL 225* 168* 190*       Imaging  CXR pending    ECG/Telemetry  I have independently reviewed the telemetry. No events for the last 24 hours.    ASSESSMENT AND PLAN      * COPD exacerbation (CMS/[facility])  Assessment & Plan  Dr. Montano is pulmonologist    Stable, no improvement from yesterday     - Repeat CXR and sputum culture to r/o hospital acquired pneumonia  - c/w methylprednisolone 40 mg IV q6h - day 7 of steroids  - azithromycin added - day 4/5  - scheduled Duonebs  - c/w budesonide nebs, LAMA  - c/w montelukast, H1 blocker for allergy triggers  - c/w guaifenesin   - respiratory viral panel negative  - pulmonology following, recs appreciated    GERD (gastroesophageal reflux disease)  Assessment & Plan  - c/w PPI in the setting of steroid use    History of prostate cancer  Assessment & Plan   - c/w finasteride, tamsulosin      Hyperlipidemia  Assessment & Plan  - c/w atorvastatin    Hypertension  Assessment & Plan  - c/w amlodipine and carvedilol      Chronic kidney disease (CKD), stage III (moderate) (CMS/[facility])  Assessment & Plan  Cr appears at baseline ~1.3    - limit nephrotoxic meds  - trend BMP       VTE Assessment: Padua    Code Status: Full Code  Estimated discharge date: 12/30/2019     ATTENDING DOCUMENTATION  ALSO SEE ATTENDING ATTESTATION SECTION OF NOTE

## 2019-12-29 NOTE — PLAN OF CARE
Problem: Adult Inpatient Plan of Care  Goal: Plan of Care Review  Outcome: Progressing  Flowsheets (Taken 12/28/2019 2024)  Progress: improving  Plan of Care Reviewed With: patient  Outcome Summary: pt verbalizes understanding of plan of care.

## 2019-12-29 NOTE — PROGRESS NOTES
Internal Medicine  Daily Progress Note       SUBJECTIVE   This is a 84 y.o. year-old male admitted on 2019 with COPD exacerbation (CMS/[facility]) [J44.1].    Interval History: ***     OBJECTIVE      Vital signs in last 24 hours:  Temp:  [36.3 °C (97.3 °F)-36.4 °C (97.5 °F)] 36.3 °C (97.3 °F)  Heart Rate:  [80-86] 83  Resp:  [20] 20  BP: (139-157)/(72-86) 139/75  Temp (24hrs), Av.3 °C (97.4 °F), Min:36.3 °C (97.3 °F), Max:36.4 °C (97.5 °F)    Intake/Output     None          PHYSICAL EXAMINATION      Physical Exam     LINES, CATHETERS, DRAINS, AIRWAYS, AND WOUNDS   Lines, Drains, Airways, Wounds:  Peripheral IV 19 Left Antecubital (Active)   Number of days: 6       Comments:      LABS / IMAGING / TELE      Labs  Results from last 7 days   Lab Units 19  0613 19  0356 19  0547   WBC K/uL 17.72* 19.10* 17.47*   HEMOGLOBIN g/dL 14.4 15.4 15.2   HEMATOCRIT % 42.4 46.5 45.2   PLATELETS K/uL 276 292 278     Results from last 7 days   Lab Units 19  0613 19  0356 19  0547   SODIUM mEQ/L 136 138 137   POTASSIUM mEQ/L 4.7 5.1 4.8   CHLORIDE mEQ/L 100 98 100   CO2 mEQ/L 25 29 28   BUN mg/dL 44* 38* 34*   CREATININE mg/dL 1.2 1.2 1.2   CALCIUM mg/dL 8.4* 8.8* 8.9   GLUCOSE mg/dL 225* 168* 190*       Imaging  {Imaging reviewed last 24H:44852}    ECG/Telemetry  {Findings; ecg normal:92269}    ASSESSMENT AND PLAN      * COPD exacerbation (CMS/HCC)  Assessment & Plan  No evidence of pneumonia on CXR  Dr. Montano is pulmonologist    - c/w methylprednisolone 40 mg IV q6h - day 7 of steroids  - azithromycin added - day 4/5  - scheduled Duonebs  - c/w budesonide nebs, LAMA  - c/w montelukast, H1 blocker for allergy triggers  - c/w guaifenesin   - sputum culture: normal dago  - respiratory viral panel negative  - pulmonology following, recs appreciated    GERD (gastroesophageal reflux disease)  Assessment & Plan  - c/w H2 blocker, PPI in the setting of steroid use    History of prostate  cancer  Assessment & Plan   - c/w finasteride, tamsulosin      Hyperlipidemia  Assessment & Plan  - c/w atorvastatin    Hypertension  Assessment & Plan  - c/w amlodipine and carvedilol      Chronic kidney disease (CKD), stage III (moderate) (CMS/HCC)  Assessment & Plan  Cr appears at baseline ~1.3    - limit nephrotoxic meds  - trend BMP       VTE Assessment: Padua    Code Status: Full Code  Estimated discharge date: 12/30/2019     ATTENDING DOCUMENTATION  ALSO SEE ATTENDING ATTESTATION SECTION OF NOTE

## 2019-12-29 NOTE — PLAN OF CARE
Problem: Adult Inpatient Plan of Care  Goal: Plan of Care Review  12/29/2019 1546 by Serenity Scott, RN  Outcome: Progressing  Flowsheets (Taken 12/29/2019 1546)  Progress: improving  Plan of Care Reviewed With: patient  Outcome Summary: pt veblaizes understanding of plan of care     Problem: Adult Inpatient Plan of Care  Goal: Patient-Specific Goal (Individualization)  12/29/2019 1546 by Serenity Scott, RN  Outcome: Progressing  Flowsheets (Taken 12/29/2019 1546)  Patient-Specific Goals (Include Timeframe): for breathing to imporvve  Individualized Care Needs: fall safety , OOB to chair encouraged  Anxieties, Fears or Concerns: none

## 2019-12-29 NOTE — NURSING NOTE
Pt re-assessed and o changes noted. Remains in SR HR 90s. Pt offers no new c/o. Denies pain. Will cont to monitor. Call bell in reach.

## 2019-12-30 ENCOUNTER — APPOINTMENT (INPATIENT)
Dept: RADIOLOGY | Facility: HOSPITAL | Age: 84
DRG: 190 | End: 2019-12-30
Attending: INTERNAL MEDICINE
Payer: COMMERCIAL

## 2019-12-30 LAB
ANION GAP SERPL CALC-SCNC: 7 MEQ/L (ref 3–15)
BASOPHILS # BLD: 0.05 K/UL (ref 0.01–0.1)
BASOPHILS NFR BLD: 0.3 %
BUN SERPL-MCNC: 42 MG/DL (ref 8–20)
CALCIUM SERPL-MCNC: 8.3 MG/DL (ref 8.9–10.3)
CHLORIDE SERPL-SCNC: 103 MEQ/L (ref 98–109)
CO2 SERPL-SCNC: 29 MEQ/L (ref 22–32)
CREAT SERPL-MCNC: 1.2 MG/DL
DIFFERENTIAL METHOD BLD: ABNORMAL
EOSINOPHIL # BLD: 0 K/UL (ref 0.04–0.54)
EOSINOPHIL NFR BLD: 0 %
ERYTHROCYTE [DISTWIDTH] IN BLOOD BY AUTOMATED COUNT: 12.7 % (ref 11.6–14.4)
GFR SERPL CREATININE-BSD FRML MDRD: 58 ML/MIN/1.73M*2
GLUCOSE SERPL-MCNC: 206 MG/DL (ref 70–99)
HCT VFR BLDCO AUTO: 43.6 % (ref 40.1–51)
HGB BLD-MCNC: 14.5 G/DL
IMM GRANULOCYTES # BLD AUTO: 0.47 K/UL (ref 0–0.08)
IMM GRANULOCYTES NFR BLD AUTO: 2.6 %
LYMPHOCYTES # BLD: 0.35 K/UL (ref 1.2–3.5)
LYMPHOCYTES NFR BLD: 2 %
MCH RBC QN AUTO: 31.5 PG (ref 28–33.2)
MCHC RBC AUTO-ENTMCNC: 33.3 G/DL (ref 32.2–36.5)
MCV RBC AUTO: 94.8 FL (ref 83–98)
MONOCYTES # BLD: 0.51 K/UL (ref 0.3–1)
MONOCYTES NFR BLD: 2.9 %
NEUTROPHILS # BLD: 16.45 K/UL (ref 1.7–7)
NEUTS SEG NFR BLD: 92.2 %
NRBC BLD-RTO: 0 %
PDW BLD AUTO: 10.1 FL (ref 9.4–12.4)
PLATELET # BLD AUTO: 265 K/UL
POTASSIUM SERPL-SCNC: 4.8 MEQ/L (ref 3.6–5.1)
RBC # BLD AUTO: 4.6 M/UL (ref 4.5–5.8)
SODIUM SERPL-SCNC: 139 MEQ/L (ref 136–144)
WBC # BLD AUTO: 17.83 K/UL

## 2019-12-30 PROCEDURE — 80048 BASIC METABOLIC PNL TOTAL CA: CPT | Performed by: INTERNAL MEDICINE

## 2019-12-30 PROCEDURE — 97161 PT EVAL LOW COMPLEX 20 MIN: CPT

## 2019-12-30 PROCEDURE — 85025 COMPLETE CBC W/AUTO DIFF WBC: CPT | Performed by: INTERNAL MEDICINE

## 2019-12-30 PROCEDURE — 63700000 HC SELF-ADMINISTRABLE DRUG: Performed by: INTERNAL MEDICINE

## 2019-12-30 PROCEDURE — 99233 SBSQ HOSP IP/OBS HIGH 50: CPT | Performed by: HOSPITALIST

## 2019-12-30 PROCEDURE — 63600000 HC DRUGS/DETAIL CODE: Performed by: INTERNAL MEDICINE

## 2019-12-30 PROCEDURE — 63700000 HC SELF-ADMINISTRABLE DRUG: Performed by: HOSPITALIST

## 2019-12-30 PROCEDURE — 21400000 HC ROOM AND CARE CCU/INTERMEDIATE

## 2019-12-30 PROCEDURE — 36415 COLL VENOUS BLD VENIPUNCTURE: CPT | Performed by: INTERNAL MEDICINE

## 2019-12-30 PROCEDURE — 25000000 HC PHARMACY GENERAL: Performed by: INTERNAL MEDICINE

## 2019-12-30 PROCEDURE — 71046 X-RAY EXAM CHEST 2 VIEWS: CPT

## 2019-12-30 PROCEDURE — 93005 ELECTROCARDIOGRAM TRACING: CPT | Performed by: HOSPITALIST

## 2019-12-30 RX ORDER — LEVOFLOXACIN 500 MG/1
500 TABLET, FILM COATED ORAL DAILY
Status: COMPLETED | OUTPATIENT
Start: 2019-12-30 | End: 2020-01-03

## 2019-12-30 RX ORDER — LEVOFLOXACIN 500 MG/1
750 TABLET, FILM COATED ORAL DAILY
Status: DISCONTINUED | OUTPATIENT
Start: 2019-12-30 | End: 2019-12-30

## 2019-12-30 RX ADMIN — CETIRIZINE HYDROCHLORIDE 10 MG: 10 TABLET, FILM COATED ORAL at 22:22

## 2019-12-30 RX ADMIN — FORMOTEROL FUMARATE DIHYDRATE 20 MCG: 20 SOLUTION RESPIRATORY (INHALATION) at 19:00

## 2019-12-30 RX ADMIN — METHYLPREDNISOLONE SODIUM SUCCINATE 40 MG: 40 INJECTION, POWDER, FOR SOLUTION INTRAMUSCULAR; INTRAVENOUS at 23:45

## 2019-12-30 RX ADMIN — METHYLPREDNISOLONE SODIUM SUCCINATE 40 MG: 40 INJECTION, POWDER, FOR SOLUTION INTRAMUSCULAR; INTRAVENOUS at 17:50

## 2019-12-30 RX ADMIN — CARVEDILOL 3.12 MG: 3.12 TABLET, FILM COATED ORAL at 08:23

## 2019-12-30 RX ADMIN — IPRATROPIUM BROMIDE AND ALBUTEROL SULFATE 3 ML: .5; 3 SOLUTION RESPIRATORY (INHALATION) at 23:45

## 2019-12-30 RX ADMIN — ATORVASTATIN CALCIUM 10 MG: 10 TABLET, FILM COATED ORAL at 08:23

## 2019-12-30 RX ADMIN — METHYLPREDNISOLONE SODIUM SUCCINATE 40 MG: 40 INJECTION, POWDER, FOR SOLUTION INTRAMUSCULAR; INTRAVENOUS at 13:06

## 2019-12-30 RX ADMIN — HEPARIN SODIUM 5000 UNITS: 5000 INJECTION, SOLUTION INTRAVENOUS; SUBCUTANEOUS at 13:38

## 2019-12-30 RX ADMIN — CARVEDILOL 3.12 MG: 3.12 TABLET, FILM COATED ORAL at 17:50

## 2019-12-30 RX ADMIN — FAMOTIDINE 20 MG: 20 TABLET ORAL at 08:23

## 2019-12-30 RX ADMIN — IPRATROPIUM BROMIDE AND ALBUTEROL SULFATE 3 ML: .5; 3 SOLUTION RESPIRATORY (INHALATION) at 05:47

## 2019-12-30 RX ADMIN — AZITHROMYCIN 250 MG: 250 TABLET, FILM COATED ORAL at 08:23

## 2019-12-30 RX ADMIN — DILTIAZEM HYDROCHLORIDE 120 MG: 120 CAPSULE, COATED, EXTENDED RELEASE ORAL at 08:23

## 2019-12-30 RX ADMIN — GUAIFENESIN 1200 MG: 600 TABLET, EXTENDED RELEASE ORAL at 20:40

## 2019-12-30 RX ADMIN — HEPARIN SODIUM 5000 UNITS: 5000 INJECTION, SOLUTION INTRAVENOUS; SUBCUTANEOUS at 22:23

## 2019-12-30 RX ADMIN — IPRATROPIUM BROMIDE AND ALBUTEROL SULFATE 3 ML: .5; 3 SOLUTION RESPIRATORY (INHALATION) at 13:06

## 2019-12-30 RX ADMIN — METHYLPREDNISOLONE SODIUM SUCCINATE 40 MG: 40 INJECTION, POWDER, FOR SOLUTION INTRAMUSCULAR; INTRAVENOUS at 05:47

## 2019-12-30 RX ADMIN — HEPARIN SODIUM 5000 UNITS: 5000 INJECTION, SOLUTION INTRAVENOUS; SUBCUTANEOUS at 05:47

## 2019-12-30 RX ADMIN — FLUTICASONE PROPIONATE 2 SPRAY: 50 SPRAY, METERED NASAL at 08:28

## 2019-12-30 RX ADMIN — AMLODIPINE BESYLATE 10 MG: 10 TABLET ORAL at 08:22

## 2019-12-30 RX ADMIN — BUDESONIDE 0.5 MG: 0.5 INHALANT ORAL at 05:48

## 2019-12-30 RX ADMIN — ESCITALOPRAM OXALATE 10 MG: 10 TABLET, FILM COATED ORAL at 08:24

## 2019-12-30 RX ADMIN — FINASTERIDE 5 MG: 5 TABLET, FILM COATED ORAL at 08:24

## 2019-12-30 RX ADMIN — BUDESONIDE 0.5 MG: 0.5 INHALANT ORAL at 17:50

## 2019-12-30 RX ADMIN — GUAIFENESIN 1200 MG: 600 TABLET, EXTENDED RELEASE ORAL at 08:23

## 2019-12-30 RX ADMIN — IPRATROPIUM BROMIDE AND ALBUTEROL SULFATE 3 ML: .5; 3 SOLUTION RESPIRATORY (INHALATION) at 17:49

## 2019-12-30 RX ADMIN — LEVOFLOXACIN 500 MG: 500 TABLET, FILM COATED ORAL at 13:38

## 2019-12-30 RX ADMIN — MONTELUKAST 10 MG: 10 TABLET, FILM COATED ORAL at 22:23

## 2019-12-30 RX ADMIN — TAMSULOSIN HYDROCHLORIDE 0.4 MG: 0.4 CAPSULE ORAL at 08:23

## 2019-12-30 RX ADMIN — PANTOPRAZOLE SODIUM 20 MG: 20 TABLET, DELAYED RELEASE ORAL at 08:23

## 2019-12-30 RX ADMIN — FORMOTEROL FUMARATE DIHYDRATE 20 MCG: 20 SOLUTION RESPIRATORY (INHALATION) at 05:48

## 2019-12-30 ASSESSMENT — COGNITIVE AND FUNCTIONAL STATUS - GENERAL
MOVING TO AND FROM BED TO CHAIR: 4 - NONE
CLIMB 3 TO 5 STEPS WITH RAILING: 4 - NONE
WALKING IN HOSPITAL ROOM: 4 - NONE
STANDING UP FROM CHAIR USING ARMS: 4 - NONE

## 2019-12-30 NOTE — PROGRESS NOTES
Hospital Medicine Service -  Daily Progress Note       SUBJECTIVE   Interval History: Feeling no improvement. Still significantly SOB with minimal exertion.     OBJECTIVE      Vital signs in last 24 hours:  Temp:  [36.4 °C (97.5 °F)-36.7 °C (98.1 °F)] 36.4 °C (97.5 °F)  Heart Rate:  [86-97] 93  Resp:  [18-20] 18  BP: (146-159)/(69-85) 146/75  No intake or output data in the 24 hours ending 12/30/19 1257    PHYSICAL EXAMINATION      Physical Exam   Constitutional: He is oriented to person, place, and time. He appears well-developed and well-nourished.   HENT:   Head: Normocephalic and atraumatic.   Eyes: Pupils are equal, round, and reactive to light. EOM are normal.   Neck: Normal range of motion. Neck supple.   Cardiovascular: Normal rate and regular rhythm.   Pulmonary/Chest:   Patient with conversational dyspnea. Diffuse wheezing and poor air movement.    Abdominal: Soft. Bowel sounds are normal.   Musculoskeletal: Normal range of motion.   Neurological: He is alert and oriented to person, place, and time.   Skin: Skin is warm and dry.   Psychiatric: He has a normal mood and affect. His behavior is normal.   Vitals reviewed.     LINES, CATHETERS, DRAINS, AIRWAYS, AND WOUNDS   Lines, Drains, Airways, Wounds:  Peripheral IV 12/23/19 Left Antecubital (Active)   Number of days: 7       Comments:      LABS / IMAGING / TELE      Labs  I have reviewed the patient's labs to the time of note. No new clinical concern.    Imaging  I have independently reviewed the pertinent imaging from the last 24 hrs.    ECG/Telemetry  I have independently reviewed the telemetry. No events for the last 24 hours.    ASSESSMENT AND PLAN      GERD (gastroesophageal reflux disease)  Assessment & Plan  - c/w PPI in the setting of steroid use    History of prostate cancer  Assessment & Plan   - c/w finasteride, tamsulosin      Hyperlipidemia  Assessment & Plan  - c/w atorvastatin    Hypertension  Assessment & Plan  - c/w amlodipine and  carvedilol      Chronic kidney disease (CKD), stage III (moderate) (CMS/[facility])  Assessment & Plan  Cr appears at baseline ~1.3    - limit nephrotoxic meds  - trend BMP    * COPD exacerbation (CMS/[facility])  Assessment & Plan  Dr. Montano is pulmonologist    Stable, no improvement from yesterday   Given length of time in hospital, concerning that he is showing no real improvement. Discussed with pulmonary and will start on levofloxacin.     - Repeat CXR with no evidence of pneumonia, sputum culture only growing mouth dago  - c/w methylprednisolone 40 mg IV q6h   - completed azithro, switching to levofloxacin as noted above  - scheduled Duonebs  - c/w budesonide nebs, LAMA  - c/w montelukast, H1 blocker for allergy triggers  - c/w guaifenesin   - respiratory viral panel negative  - pulmonology following, recs appreciated         VTE Assessment: Padua    VTE Prophylaxis Plan: heparin  Code Status: Full Code  Estimated Discharge Date: 12/30/2019  Disposition Planning: home when improved     Mariaa Iyer MD  12/30/2019

## 2019-12-30 NOTE — PLAN OF CARE
Problem: Adult Inpatient Plan of Care  Goal: Plan of Care Review  Flowsheets  Taken 12/30/2019 1519  Progress: improving  Outcome Summary: Pt resting in room, pleasant, understands plan of care.  Taken 12/30/2019 0800  Plan of Care Reviewed With: patient     Problem: Adult Inpatient Plan of Care  Goal: Patient-Specific Goal (Individualization)  Outcome: Progressing  Flowsheets (Taken 12/30/2019 1519)  Patient-Specific Goals (Include Timeframe): for breathing to improve, to have less [place] and SOB  Individualized Care Needs: Fall prevention, OOB to chair encouraged, coughing encouraged  Anxieties, Fears or Concerns: none at this time

## 2019-12-30 NOTE — PROGRESS NOTES
Patient: Zuhair Luong Jr  Location: [place] [address]  MRN: [id number]  Today's date: 12/30/2019    Pt left in bedside chair with call bell and personal items within reach. RN informed of session completed.     Nearing baseline, no further PT needs, DC PT, rec home    Hospital Course  Ed is a 84 y.o. male admitted on 12/23/2019 with COPD exacerbation (CMS/HCC) [J44.1]. Principal problem is COPD exacerbation (CMS/HCC).    Ed has a past medical history of COPD (chronic obstructive pulmonary disease) (CMS/HCC), Diverticulitis of colon, Emphysema lung (CMS/HCC), Hypertension, and Prostate cancer (CMS/HCC).    Therapy Pain/Vitals - 12/30/19 1502        Pain/Comfort/Sleep    Pain Charting Type  Pain Assessment     Preferred Pain Scale  number (Numeric Rating Pain Scale)     Pain Rating (0-10): Rest  0     Pain Rating (0-10): Activity  0        Vital Signs    Pulse  (!) 113     SpO2  (!) 88 %     Patient Activity  Walking     Oxygen Therapy  Supplemental oxygen     O2 Delivery Method  Nasal cannula     O2 Flow Rate (L/min)  4 L/min           Prior Living Environment      Most Recent Value   Living Arrangements  house   Living Environment Comment  lives with wife, 2SH, 1st fl set up, 1STE          Prior Level of Function      Most Recent Value   Ambulation  independent   Transferring  independent   Toileting  independent   Bathing  independent   Dressing  independent   Eating  independent   Communication  understands/communicates without difficulty   Swallowing  swallows foods/liquids without difficulty   Prior Level of Function Comment  ambs without AD, on home 2LNC O2   Equipment Currently Used at Home  oxygen          PT Evaluation and Treatment - 12/30/19 1502        Time Calculation    Start Time  1502     Stop Time  1519     Time Calculation (min)  17 min        Session Details    Document Type  daily treatment/progress note     Mode of Treatment  physical therapy        General Information     Patient Profile Reviewed?  yes     Existing Precautions/Restrictions  oxygen therapy device and L/min        Bed Mobility    [place], Sit to Supine  independent        Sit to Stand Transfer    [place], Sit to Stand Transfer  independent        Stand to Sit Transfer    [place], Stand to Sit Transfer  independent        Gait Training    [place], Gait  independent     Distance in Feet  30 feet     Gait Pattern Utilized  step-through     Deviations/Abnormal Patterns (Gait)  den decreased     Comment  pt able to manage her oxygen tubing independently        Stairs Training    [place], Stairs  not tested     Comment  tested in previous session, pt without concerns        AM-PAC (TM) - Mobility (Current Function)    Turning from your back to your side while in a flat bed without using bedrails?  4 - None     Moving from lying on your back to sitting on the side of a flat bed without using bedrails?  4 - None     Moving to and from a bed to a chair?  4 - None     Standing up from a chair using your arms?  4 - None     To walk in a hospital room?  4 - None     Climbing 3-5 steps with a railing?  4 - None     AM-PAC (TM) Mobility Score  24        Therapy Assessment/Plan (PT)    Rehab Potential (PT)  --    no further PT needs, DC PT    Therapy Frequency (PT)  --    no further PT needs, DC PT       Progress Summary (PT)    Daily Outcome Statement (PT)  pt nearing baseline, ind for mobility, limited by breathing, no further PT needs, DC PT, rec home at DC        Therapy Plan Review/Discharge Plan (PT)    Anticipated Equipment Needs at Discharge (PT Eval)  none     PT Recommended Discharge Disposition  home           Pain Assessment/Intervention  Pain Charting Type: Pain Assessment             Education provided this session. See the Patient Education summary report for full details.    PT Goals      Most Recent Value   Gait Training Goal 1   Activity/Assistive Device  gait (walking locomotion) at  12/24/2019 1145   [place]  independent at 12/24/2019 1145   Distance  150 at 12/24/2019 1145   Time Frame  by discharge at 12/24/2019 1145   Progress/Outcome  goal met [adequate for DC] at 12/30/2019 1502   Stairs Goal 1   Activity/Assistive Device  stairs, all skills at 12/24/2019 1145   [place]  modified independence at 12/24/2019 1145   Number of Stairs  12 at 12/24/2019 1145   Time Frame  by discharge at 12/24/2019 1145   Progress/Outcome  goal met [adequate for DC] at 12/30/2019 1502

## 2019-12-30 NOTE — TREATMENT PLAN
COPD education has been completed with patient on previous hospital admission. He is already enrolled in pulmonary rehab

## 2019-12-30 NOTE — PLAN OF CARE
Problem: Adult Inpatient Plan of Care  Goal: Plan of Care Review  12/30/2019 1619 by Dhruv Manley RN  Flowsheets (Taken 12/30/2019 1619)  Progress: improving  Plan of Care Reviewed With: patient  Outcome Summary: [facility]AC[facility] Thursday vs Friday[facility] Still requiring IV abx and steroids.     Problem: Adult Inpatient Plan of Care  Goal: Readiness for Transition of Care  Intervention: Mutually Develop Transition Plan  Flowsheets  Taken 12/30/2019 1617 by Dhruv Manley RN  Readmission Within the Last 30 Days: no previous admission in last 30 days  Taken 12/24/2019 1144 by Leona Rincon LSW  Concerns to be Addressed: care coordination/care conferences;discharge planning

## 2019-12-30 NOTE — PLAN OF CARE
Problem: Adult Inpatient Plan of Care  Goal: Plan of Care Review  Outcome: Progressing  Flowsheets (Taken 12/29/2019 2012)  Progress: improving  Plan of Care Reviewed With: patient  Outcome Summary: pt verbalizes understanding of plan of care.

## 2019-12-30 NOTE — CONSULTS
"Brief Nutrition Note    Recommendations  1. Cardiac diet, add 2000ADA, NCS diet  2. BS check q 6 hours with insulin gtt BS goal 140-180mg/dL     Nutrition Charting Type: Nutrition Brief Assessment    Clinical Course: Patient is a 84 y.o. male who was admitted on 12/23/2019 with a diagnosis of COPD exacerbation (CMS/HCC) [J44.1].     Past Medical History:   Diagnosis Date   • COPD (chronic obstructive pulmonary disease) (CMS/HCC)    • Diverticulitis of colon     1987   • Emphysema lung (CMS/HCC)    • Hypertension    • Prostate cancer (CMS/HCC)     2007 s/p RT withouut recurrence     Past Surgical History:   Procedure Laterality Date   • COLON SURGERY     • MULTIPLE TOOTH EXTRACTIONS  Sept.-Nov 2018   • NASAL SEPTUM SURGERY     • SINUS SURGERY  11/2012 11/2012 by Dr. Mora       Reason for Assessment  Reason For Assessment: identified at risk by screening criteria(LOS)     [facility] Nutrition Screen Tool  Has patient lost weight without trying?: 0-->No  If yes,how much weight has been lost?: 0-->Patient has not lost weight  Has patient been eating poorly due to decreased appetite?: 0-->No  [facility] Nutrition Screen Score: 0     Nutrition/Diet History  Intake (%): 100%    Physical Findings  Last Bowel Movement: 12/29/19     [id number] Physical Appearance  Last Bowel Movement: 12/29/19     Nutrition Order  Nutrition Order Review: meets nutritional requirements  Nutrition Order Comments: Cardiac     Anthropometrics  Height: 172.7 cm (5' 8\")       Current Weight  Weight Method: Standing scale  Weight: 75.3 kg (166 lb)     Ideal Body Weight (IBW)  Ideal Body Weight (IBW) (kg): 70.89  % Ideal Body Weight: 111.33       Body Mass Index (BMI)  BMI (Calculated): 25.2  BMI (kg/m2): 26.51     Labs/Procedures/Meds  Lab Results Reviewed: reviewed, pertinent   Lab Results   Component Value Date    GLUCOSE 206 (H) 12/30/2019    CALCIUM 8.3 (L) 12/30/2019     12/30/2019    K 4.8 12/30/2019    CO2 29 12/30/2019     12/30/2019    BUN " 42 (H) 12/30/2019    CREATININE 1.2 12/30/2019     Lab Results   Component Value Date    HGBA1C 6.3 (H) 11/03/2016 12/29 glucose 225  12/28 glucose 168  12/27 glucose 190     Medications  Pertinent Medications Reviewed: reviewed, pertinent   Pepcid, Fosamax, Solumedrol, Protonix    Skin: Intact no edema    Clinical comments: Patient generally without complaints at time of visit. Aware of dietary restrictions, no questions or concerns. +appetite denies n/v/d or difficulties chewing or swallowing. Very much a fan of [employer] food!  Elevated BS while on steroid. BS goal 140-180mg/dL, needs BS checks and coverage.    NKFA.    Goals: PO greater then 50-75% meals, -180mg/dL  Monitor: BS, po intake medical status and plan of care    Recommendations: See above       Date: 12/30/19  Signature: XIN Alfonso

## 2019-12-30 NOTE — PROGRESS NOTES
Pulmonary Progress Note    Subjective    Interval History: Feels as though he really has not had significant improvement in his breathing.  Continues to have cough and significant mucus production which appears yellow.  Went downstairs for an x-ray today and produce a sputum culture last night.    Objective    Vital signs over last 24 hours:  Temp:  [36.4 °C (97.5 °F)-36.7 °C (98.1 °F)] 36.4 °C (97.5 °F)  Heart Rate:  [86-97] 93  Resp:  [18-20] 18  BP: (146-159)/(69-85) 146/75    No intake or output data in the 24 hours ending 12/30/19 1253    Physical Exam:  General: Awake, alert, frequent coughing spells, mild respiratory distress  Neck: no JVD, no LAD  Lungs: Coarse rhonchi bilaterally with few expiratory wheezes  Heart: RRR, S1S2 normal, no murmurs  Abdomen: soft, NTND, BS normal  Extremities: no clubbing or cyanosis, no LE edema  Neuro: grossly normal, moving all extremities    Labs  Lab Results   Component Value Date    WBC 17.83 (H) 12/30/2019    HGB 14.5 12/30/2019    HCT 43.6 12/30/2019     12/30/2019    ALT 14 (L) 11/13/2018    AST 15 11/13/2018     12/30/2019    K 4.8 12/30/2019     12/30/2019    CREATININE 1.2 12/30/2019    BUN 42 (H) 12/30/2019    CO2 29 12/30/2019    INR 1.0 08/21/2017    HGBA1C 6.3 (H) 11/03/2016    MICROALBUR 12.3 10/25/2019       Imaging  Chest x-ray from today reviewed did not show any acute finding or new infiltrate     Assessment     Mr. 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     1.  Acute COPD exacerbation- no clear trigger at this point but possibly due to prior tracheobronchitis     2.  History of dres syndrome     Plan     -Continue Solu-Medrol 40 mg every 6 hours   - Continue nebulizers with DuoNeb's every 6 hours  -Continue  budesonide every 12 and Perforomist twice daily  -Continue Mucinex  - Would change azithromycin to Levaquin and monitor for improvement. Possibly prolonged exacerbation due to bacterial tracheobronchitis.   -Follow up sputum culture results    David Santamaria MD  Pulmonary/Critical Care Fellow  PGY-6

## 2019-12-30 NOTE — NURSING NOTE
Pt resting quietly in chair. Wife at bedside. No complaints at this time. Will continue to monitor.

## 2019-12-30 NOTE — PLAN OF CARE
Problem: Adult Inpatient Plan of Care  Goal: Plan of Care Review  Outcome: Progressing  Flowsheets (Taken 12/30/2019 1521)  Plan of Care Reviewed With: patient  Outcome Summary: nearing baseline, no further PT needs, DC PT, rec home

## 2019-12-31 LAB
ANION GAP SERPL CALC-SCNC: 10 MEQ/L (ref 3–15)
ATRIAL RATE: 89
BASOPHILS # BLD: 0.05 K/UL (ref 0.01–0.1)
BASOPHILS NFR BLD: 0.3 %
BUN SERPL-MCNC: 42 MG/DL (ref 8–20)
CALCIUM SERPL-MCNC: 8.3 MG/DL (ref 8.9–10.3)
CHLORIDE SERPL-SCNC: 100 MEQ/L (ref 98–109)
CO2 SERPL-SCNC: 27 MEQ/L (ref 22–32)
CREAT SERPL-MCNC: 1.2 MG/DL
DIFFERENTIAL METHOD BLD: ABNORMAL
EOSINOPHIL # BLD: 0 K/UL (ref 0.04–0.54)
EOSINOPHIL NFR BLD: 0 %
ERYTHROCYTE [DISTWIDTH] IN BLOOD BY AUTOMATED COUNT: 12.7 % (ref 11.6–14.4)
GFR SERPL CREATININE-BSD FRML MDRD: 58 ML/MIN/1.73M*2
GLUCOSE SERPL-MCNC: 212 MG/DL (ref 70–99)
HCT VFR BLDCO AUTO: 42.8 % (ref 40.1–51)
HGB BLD-MCNC: 14.5 G/DL
IMM GRANULOCYTES # BLD AUTO: 0.69 K/UL (ref 0–0.08)
IMM GRANULOCYTES NFR BLD AUTO: 3.8 %
LYMPHOCYTES # BLD: 0.42 K/UL (ref 1.2–3.5)
LYMPHOCYTES NFR BLD: 2.3 %
MCH RBC QN AUTO: 31.8 PG (ref 28–33.2)
MCHC RBC AUTO-ENTMCNC: 33.9 G/DL (ref 32.2–36.5)
MCV RBC AUTO: 93.9 FL (ref 83–98)
MONOCYTES # BLD: 0.62 K/UL (ref 0.3–1)
MONOCYTES NFR BLD: 3.4 %
NEUTROPHILS # BLD: 16.4 K/UL (ref 1.7–7)
NEUTS SEG NFR BLD: 90.2 %
NRBC BLD-RTO: 0 %
P AXIS: 49
PDW BLD AUTO: 9.9 FL (ref 9.4–12.4)
PLATELET # BLD AUTO: 264 K/UL
POTASSIUM SERPL-SCNC: 5.1 MEQ/L (ref 3.6–5.1)
PR INTERVAL: 138
QRS DURATION: 72
QT INTERVAL: 354
QTC CALCULATION(BAZETT): 430
R AXIS: -26
RBC # BLD AUTO: 4.56 M/UL (ref 4.5–5.8)
SODIUM SERPL-SCNC: 137 MEQ/L (ref 136–144)
T WAVE AXIS: 65
VENTRICULAR RATE: 89
WBC # BLD AUTO: 18.18 K/UL

## 2019-12-31 PROCEDURE — 85025 COMPLETE CBC W/AUTO DIFF WBC: CPT | Performed by: INTERNAL MEDICINE

## 2019-12-31 PROCEDURE — 63700000 HC SELF-ADMINISTRABLE DRUG: Performed by: HOSPITALIST

## 2019-12-31 PROCEDURE — 21400000 HC ROOM AND CARE CCU/INTERMEDIATE

## 2019-12-31 PROCEDURE — 63700000 HC SELF-ADMINISTRABLE DRUG: Performed by: INTERNAL MEDICINE

## 2019-12-31 PROCEDURE — 25000000 HC PHARMACY GENERAL: Performed by: INTERNAL MEDICINE

## 2019-12-31 PROCEDURE — 36415 COLL VENOUS BLD VENIPUNCTURE: CPT | Performed by: INTERNAL MEDICINE

## 2019-12-31 PROCEDURE — 82310 ASSAY OF CALCIUM: CPT | Performed by: INTERNAL MEDICINE

## 2019-12-31 PROCEDURE — 99233 SBSQ HOSP IP/OBS HIGH 50: CPT | Performed by: HOSPITALIST

## 2019-12-31 PROCEDURE — 63600000 HC DRUGS/DETAIL CODE: Performed by: INTERNAL MEDICINE

## 2019-12-31 PROCEDURE — 93010 ELECTROCARDIOGRAM REPORT: CPT | Performed by: INTERNAL MEDICINE

## 2019-12-31 RX ADMIN — HEPARIN SODIUM 5000 UNITS: 5000 INJECTION, SOLUTION INTRAVENOUS; SUBCUTANEOUS at 06:19

## 2019-12-31 RX ADMIN — MONTELUKAST 10 MG: 10 TABLET, FILM COATED ORAL at 21:48

## 2019-12-31 RX ADMIN — FAMOTIDINE 20 MG: 20 TABLET ORAL at 08:19

## 2019-12-31 RX ADMIN — HEPARIN SODIUM 5000 UNITS: 5000 INJECTION, SOLUTION INTRAVENOUS; SUBCUTANEOUS at 21:48

## 2019-12-31 RX ADMIN — GUAIFENESIN 1200 MG: 600 TABLET, EXTENDED RELEASE ORAL at 08:18

## 2019-12-31 RX ADMIN — METHYLPREDNISOLONE SODIUM SUCCINATE 40 MG: 40 INJECTION, POWDER, FOR SOLUTION INTRAMUSCULAR; INTRAVENOUS at 11:46

## 2019-12-31 RX ADMIN — FLUTICASONE PROPIONATE 2 SPRAY: 50 SPRAY, METERED NASAL at 08:18

## 2019-12-31 RX ADMIN — GUAIFENESIN 1200 MG: 600 TABLET, EXTENDED RELEASE ORAL at 19:17

## 2019-12-31 RX ADMIN — METHYLPREDNISOLONE SODIUM SUCCINATE 40 MG: 40 INJECTION, POWDER, FOR SOLUTION INTRAMUSCULAR; INTRAVENOUS at 06:20

## 2019-12-31 RX ADMIN — TAMSULOSIN HYDROCHLORIDE 0.4 MG: 0.4 CAPSULE ORAL at 08:18

## 2019-12-31 RX ADMIN — AMLODIPINE BESYLATE 10 MG: 10 TABLET ORAL at 08:19

## 2019-12-31 RX ADMIN — IPRATROPIUM BROMIDE AND ALBUTEROL SULFATE 3 ML: .5; 3 SOLUTION RESPIRATORY (INHALATION) at 23:16

## 2019-12-31 RX ADMIN — BUDESONIDE 0.5 MG: 0.5 INHALANT ORAL at 06:18

## 2019-12-31 RX ADMIN — BUDESONIDE 0.5 MG: 0.5 INHALANT ORAL at 17:34

## 2019-12-31 RX ADMIN — HEPARIN SODIUM 5000 UNITS: 5000 INJECTION, SOLUTION INTRAVENOUS; SUBCUTANEOUS at 13:38

## 2019-12-31 RX ADMIN — PANTOPRAZOLE SODIUM 20 MG: 20 TABLET, DELAYED RELEASE ORAL at 08:19

## 2019-12-31 RX ADMIN — CETIRIZINE HYDROCHLORIDE 10 MG: 10 TABLET, FILM COATED ORAL at 21:48

## 2019-12-31 RX ADMIN — CARVEDILOL 3.12 MG: 3.12 TABLET, FILM COATED ORAL at 08:18

## 2019-12-31 RX ADMIN — FINASTERIDE 5 MG: 5 TABLET, FILM COATED ORAL at 08:18

## 2019-12-31 RX ADMIN — DILTIAZEM HYDROCHLORIDE 120 MG: 120 CAPSULE, COATED, EXTENDED RELEASE ORAL at 08:19

## 2019-12-31 RX ADMIN — METHYLPREDNISOLONE SODIUM SUCCINATE 40 MG: 40 INJECTION, POWDER, FOR SOLUTION INTRAMUSCULAR; INTRAVENOUS at 17:34

## 2019-12-31 RX ADMIN — LEVOFLOXACIN 500 MG: 500 TABLET, FILM COATED ORAL at 08:18

## 2019-12-31 RX ADMIN — IPRATROPIUM BROMIDE AND ALBUTEROL SULFATE 3 ML: .5; 3 SOLUTION RESPIRATORY (INHALATION) at 11:46

## 2019-12-31 RX ADMIN — FORMOTEROL FUMARATE DIHYDRATE 20 MCG: 20 SOLUTION RESPIRATORY (INHALATION) at 06:24

## 2019-12-31 RX ADMIN — FORMOTEROL FUMARATE DIHYDRATE 20 MCG: 20 SOLUTION RESPIRATORY (INHALATION) at 17:33

## 2019-12-31 RX ADMIN — ATORVASTATIN CALCIUM 10 MG: 10 TABLET, FILM COATED ORAL at 08:18

## 2019-12-31 RX ADMIN — IPRATROPIUM BROMIDE AND ALBUTEROL SULFATE 3 ML: .5; 3 SOLUTION RESPIRATORY (INHALATION) at 06:18

## 2019-12-31 RX ADMIN — CARVEDILOL 3.12 MG: 3.12 TABLET, FILM COATED ORAL at 17:35

## 2019-12-31 RX ADMIN — IPRATROPIUM BROMIDE AND ALBUTEROL SULFATE 3 ML: .5; 3 SOLUTION RESPIRATORY (INHALATION) at 17:34

## 2019-12-31 RX ADMIN — ESCITALOPRAM OXALATE 10 MG: 10 TABLET, FILM COATED ORAL at 08:19

## 2019-12-31 NOTE — PROGRESS NOTES
Pulmonary Progress Note    Subjective    Interval History: Feels as if his breathing and cough has slightly improved.  Continues to have cough and significant mucus production which appears yellow.      Objective    Vital signs over last 24 hours:  Temp:  [36.4 °C (97.5 °F)-36.8 °C (98.2 °F)] 36.6 °C (97.8 °F)  Heart Rate:  [] 92  Resp:  [18-20] 20  BP: (134-166)/(69-88) 166/88    No intake or output data in the 24 hours ending 12/31/19 1104    Physical Exam:  General: Awake, alert, frequent coughing spells, mild respiratory distress  Neck: no JVD, no LAD  Lungs: Coarse rhonchi bilaterally with few expiratory wheezes  Heart: RRR, S1S2 normal, no murmurs  Abdomen: soft, NTND, BS normal  Extremities: no clubbing or cyanosis, no LE edema  Neuro: grossly normal, moving all extremities    Labs  Lab Results   Component Value Date    WBC 18.18 (H) 12/31/2019    HGB 14.5 12/31/2019    HCT 42.8 12/31/2019     12/31/2019    ALT 14 (L) 11/13/2018    AST 15 11/13/2018     12/31/2019    K 5.1 12/31/2019     12/31/2019    CREATININE 1.2 12/31/2019    BUN 42 (H) 12/31/2019    CO2 27 12/31/2019    INR 1.0 08/21/2017    HGBA1C 6.3 (H) 11/03/2016    MICROALBUR 12.3 10/25/2019     CBC Results       12/31/19 12/30/19 12/29/19                    0647 0621 0613         WBC 18.18 17.83 17.72         RBC 4.56 4.60 4.51         HGB 14.5 14.5 14.4         HCT 42.8 43.6 42.4         MCV 93.9 94.8 94.0         MCH 31.8 31.5 31.9         MCHC 33.9 33.3 34.0          265 276                       Imaging  Chest x-ray from (12/30) reviewed did not show any acute finding or new infiltrate     Assessment     Mr. 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     1.  Acute COPD exacerbation- no  clear trigger at this point but possibly due to prior tracheobronchitis     2.  History of dres syndrome     Plan     -Continue Solu-Medrol 40 mg every 6 hours   - Continue nebulizers with DuoNeb's every 6 hours  - Would add albuterol nebulized q4 and also add vest therapy TID.  -Continue budesonide every 12 and Perforomist twice daily  -Continue Mucinex  - Continue Levaquin for now  -Follow up sputum culture results    Monroe Galvez MD  Pulmonary/Critical Care Fellow  PGY-5

## 2019-12-31 NOTE — PLAN OF CARE
Problem: Adult Inpatient Plan of Care  Goal: Plan of Care Review  Outcome: Progressing  Flowsheets (Taken 12/31/2019 0441)  Progress: improving  Plan of Care Reviewed With: patient  Outcome Summary: pt given IV steroids q 6hrs, pulse ox maintained _> 95%     Problem: Adult Inpatient Plan of Care  Goal: Patient-Specific Goal (Individualization)  Outcome: Progressing  Flowsheets (Taken 12/31/2019 0441)  Patient-Specific Goals (Include Timeframe): pt will  be able to completed ADL's with less GREEN[place]OD and SOB  Individualized Care Needs: increase activity as tolerated  Anxieties, Fears or Concerns: concerned that this admission is the worst exacerbation

## 2019-12-31 NOTE — PROGRESS NOTES
Hospital Medicine Service -  Daily Progress Note       SUBJECTIVE   Interval History: Feeling a bit better today. Seen with children at bedside.      OBJECTIVE      Vital signs in last 24 hours:  Temp:  [36.4 °C (97.5 °F)-36.8 °C (98.2 °F)] 36.6 °C (97.8 °F)  Heart Rate:  [] 99  Resp:  [18-20] 18  BP: (134-166)/(69-90) 144/90  No intake or output data in the 24 hours ending 12/31/19 1418    PHYSICAL EXAMINATION      Physical Exam   Constitutional: He is oriented to person, place, and time. He appears well-developed and well-nourished.   HENT:   Head: Normocephalic and atraumatic.   Eyes: Pupils are equal, round, and reactive to light. EOM are normal.   Neck: Normal range of motion. Neck supple.   Cardiovascular: Normal rate and regular rhythm.   Pulmonary/Chest:   Scattered wheezing and rhonchi throughout   Abdominal: Soft. Bowel sounds are normal.   Musculoskeletal: Normal range of motion.   Neurological: He is alert and oriented to person, place, and time.   Skin: Skin is warm and dry.   Psychiatric: He has a normal mood and affect. His behavior is normal.   Vitals reviewed.   LINES, CATHETERS, DRAINS, AIRWAYS, AND WOUNDS   Lines, Drains, Airways, Wounds:  Peripheral IV 12/23/19 Left Antecubital (Active)   Number of days: 8       Comments:      LABS / IMAGING / TELE      Labs  I have reviewed the patient's pertinent labs. Pertinent labs are within normal limits.    Imaging  I have independently reviewed the pertinent imaging from the last 24 hrs.    ECG/Telemetry  I have independently reviewed the telemetry. No events for the last 24 hours.    ASSESSMENT AND PLAN      GERD (gastroesophageal reflux disease)  Assessment & Plan  - c/w PPI in the setting of steroid use    History of prostate cancer  Assessment & Plan   - c/w finasteride, tamsulosin      Hyperlipidemia  Assessment & Plan  - c/w atorvastatin    Hypertension  Assessment & Plan  - c/w amlodipine and carvedilol      Chronic kidney disease (CKD),  stage III (moderate) (CMS/HCC)  Assessment & Plan  Cr appears at baseline ~1.3    - limit nephrotoxic meds  - trend BMP    * COPD exacerbation (CMS/[facility])  Assessment & Plan  Dr. Montano is pulmonologist    Some mild improvement from yesterday. Plan to continue levofloxacin. Keep steroids today, will add chest pt vest. Will wean steroids tomorrow if he continues to improve.    - Repeat CXR with no evidence of pneumonia, sputum culture only growing mouth dago  - c/w methylprednisolone 40 mg IV q6h   - completed azithro, switching to levofloxacin as noted above  - scheduled Duonebs  - c/w budesonide nebs, LAMA  - c/w montelukast, H1 blocker for allergy triggers  - c/w guaifenesin   - respiratory viral panel negative  - pulmonology following, recs appreciated         VTE Assessment: Padua    VTE Prophylaxis Plan: heparin  Code Status: Full Code  Estimated Discharge Date: 1/2/2020  Disposition Planning: home     Mariaa Iyer MD  12/31/2019

## 2019-12-31 NOTE — TREATMENT PLAN
Attempted to see patient for CPT via vest. He asked that I come back at a later time. He is not up for it right now

## 2019-12-31 NOTE — PLAN OF CARE
Problem: Adult Inpatient Plan of Care  Goal: Patient-Specific Goal (Individualization)  Outcome: Progressing  Flowsheets (Taken 12/31/2019 1229)  Patient-Specific Goals (Include Timeframe): to be able to complete ADLs at previous baseline by discharge  Individualized Care Needs: encourage pt to be OOB frequenently, encourage ambulation  Anxieties, Fears or Concerns: concerned that he still feels SOB at rest at times     Problem: Adult Inpatient Plan of Care  Goal: Plan of Care Review  Outcome: Progressing  Flowsheets (Taken 12/31/2019 1229)  Progress: improving  Plan of Care Reviewed With: patient  Outcome Summary: IV steroids, ATC nebbs, O2 at 4 liters

## 2019-12-31 NOTE — NURSING NOTE
Pt requests not to be transferred from 1S. Discussed with Dr Iyer. OK to leave pt on 1S for now and will reassess tomorrow during rounds.

## 2019-12-31 NOTE — TREATMENT PLAN
Attempted to see patient again for CPT via vest. He was just finishing his dinner. Did not want to put him right on the vest because I did not want to make him nauseous. Plan to return later or do vest first thing in the morning

## 2020-01-01 LAB
ANION GAP SERPL CALC-SCNC: 8 MEQ/L (ref 3–15)
BASOPHILS # BLD: 0.05 K/UL (ref 0.01–0.1)
BASOPHILS NFR BLD: 0.3 %
BUN SERPL-MCNC: 39 MG/DL (ref 8–20)
CALCIUM SERPL-MCNC: 8.1 MG/DL (ref 8.9–10.3)
CHLORIDE SERPL-SCNC: 102 MEQ/L (ref 98–109)
CO2 SERPL-SCNC: 27 MEQ/L (ref 22–32)
CREAT SERPL-MCNC: 1.2 MG/DL
DIFFERENTIAL METHOD BLD: ABNORMAL
EOSINOPHIL # BLD: 0 K/UL (ref 0.04–0.54)
EOSINOPHIL NFR BLD: 0 %
ERYTHROCYTE [DISTWIDTH] IN BLOOD BY AUTOMATED COUNT: 12.7 % (ref 11.6–14.4)
GFR SERPL CREATININE-BSD FRML MDRD: 58 ML/MIN/1.73M*2
GLUCOSE SERPL-MCNC: 212 MG/DL (ref 70–99)
HCT VFR BLDCO AUTO: 42.9 % (ref 40.1–51)
HGB BLD-MCNC: 14.7 G/DL
IMM GRANULOCYTES # BLD AUTO: 0.82 K/UL (ref 0–0.08)
IMM GRANULOCYTES NFR BLD AUTO: 4.7 %
LYMPHOCYTES # BLD: 0.41 K/UL (ref 1.2–3.5)
LYMPHOCYTES NFR BLD: 2.4 %
MCH RBC QN AUTO: 31.9 PG (ref 28–33.2)
MCHC RBC AUTO-ENTMCNC: 34.3 G/DL (ref 32.2–36.5)
MCV RBC AUTO: 93.1 FL (ref 83–98)
MONOCYTES # BLD: 0.69 K/UL (ref 0.3–1)
MONOCYTES NFR BLD: 4 %
NEUTROPHILS # BLD: 15.37 K/UL (ref 1.7–7)
NEUTS SEG NFR BLD: 88.6 %
NRBC BLD-RTO: 0 %
PDW BLD AUTO: 9.7 FL (ref 9.4–12.4)
PLATELET # BLD AUTO: 252 K/UL
POTASSIUM SERPL-SCNC: 4.8 MEQ/L (ref 3.6–5.1)
RBC # BLD AUTO: 4.61 M/UL (ref 4.5–5.8)
SODIUM SERPL-SCNC: 137 MEQ/L (ref 136–144)
WBC # BLD AUTO: 17.34 K/UL

## 2020-01-01 PROCEDURE — 94669 MECHANICAL CHEST WALL OSCILL: CPT

## 2020-01-01 PROCEDURE — 36415 COLL VENOUS BLD VENIPUNCTURE: CPT | Performed by: INTERNAL MEDICINE

## 2020-01-01 PROCEDURE — 63600000 HC DRUGS/DETAIL CODE: Performed by: INTERNAL MEDICINE

## 2020-01-01 PROCEDURE — 21400000 HC ROOM AND CARE CCU/INTERMEDIATE

## 2020-01-01 PROCEDURE — 63700000 HC SELF-ADMINISTRABLE DRUG: Performed by: INTERNAL MEDICINE

## 2020-01-01 PROCEDURE — 63700000 HC SELF-ADMINISTRABLE DRUG: Performed by: HOSPITALIST

## 2020-01-01 PROCEDURE — 85025 COMPLETE CBC W/AUTO DIFF WBC: CPT | Performed by: INTERNAL MEDICINE

## 2020-01-01 PROCEDURE — 80048 BASIC METABOLIC PNL TOTAL CA: CPT | Performed by: INTERNAL MEDICINE

## 2020-01-01 PROCEDURE — 25000000 HC PHARMACY GENERAL: Performed by: INTERNAL MEDICINE

## 2020-01-01 PROCEDURE — 99233 SBSQ HOSP IP/OBS HIGH 50: CPT | Performed by: HOSPITALIST

## 2020-01-01 RX ADMIN — BUDESONIDE 0.5 MG: 0.5 INHALANT ORAL at 06:42

## 2020-01-01 RX ADMIN — DILTIAZEM HYDROCHLORIDE 120 MG: 120 CAPSULE, COATED, EXTENDED RELEASE ORAL at 08:59

## 2020-01-01 RX ADMIN — FINASTERIDE 5 MG: 5 TABLET, FILM COATED ORAL at 08:59

## 2020-01-01 RX ADMIN — GUAIFENESIN 1200 MG: 600 TABLET, EXTENDED RELEASE ORAL at 08:59

## 2020-01-01 RX ADMIN — CARVEDILOL 3.12 MG: 3.12 TABLET, FILM COATED ORAL at 17:02

## 2020-01-01 RX ADMIN — ATORVASTATIN CALCIUM 10 MG: 10 TABLET, FILM COATED ORAL at 08:59

## 2020-01-01 RX ADMIN — HEPARIN SODIUM 5000 UNITS: 5000 INJECTION, SOLUTION INTRAVENOUS; SUBCUTANEOUS at 06:42

## 2020-01-01 RX ADMIN — FORMOTEROL FUMARATE DIHYDRATE 20 MCG: 20 SOLUTION RESPIRATORY (INHALATION) at 20:43

## 2020-01-01 RX ADMIN — CARVEDILOL 3.12 MG: 3.12 TABLET, FILM COATED ORAL at 09:00

## 2020-01-01 RX ADMIN — AMLODIPINE BESYLATE 10 MG: 10 TABLET ORAL at 08:58

## 2020-01-01 RX ADMIN — IPRATROPIUM BROMIDE AND ALBUTEROL SULFATE 3 ML: .5; 3 SOLUTION RESPIRATORY (INHALATION) at 11:41

## 2020-01-01 RX ADMIN — IPRATROPIUM BROMIDE AND ALBUTEROL SULFATE 3 ML: .5; 3 SOLUTION RESPIRATORY (INHALATION) at 06:42

## 2020-01-01 RX ADMIN — HEPARIN SODIUM 5000 UNITS: 5000 INJECTION, SOLUTION INTRAVENOUS; SUBCUTANEOUS at 22:20

## 2020-01-01 RX ADMIN — PANTOPRAZOLE SODIUM 20 MG: 20 TABLET, DELAYED RELEASE ORAL at 08:59

## 2020-01-01 RX ADMIN — BUDESONIDE 0.5 MG: 0.5 INHALANT ORAL at 17:02

## 2020-01-01 RX ADMIN — LEVOFLOXACIN 500 MG: 500 TABLET, FILM COATED ORAL at 08:59

## 2020-01-01 RX ADMIN — MONTELUKAST 10 MG: 10 TABLET, FILM COATED ORAL at 22:20

## 2020-01-01 RX ADMIN — CETIRIZINE HYDROCHLORIDE 10 MG: 10 TABLET, FILM COATED ORAL at 22:20

## 2020-01-01 RX ADMIN — FLUTICASONE PROPIONATE 2 SPRAY: 50 SPRAY, METERED NASAL at 09:02

## 2020-01-01 RX ADMIN — ESCITALOPRAM OXALATE 10 MG: 10 TABLET, FILM COATED ORAL at 08:59

## 2020-01-01 RX ADMIN — GUAIFENESIN 1200 MG: 600 TABLET, EXTENDED RELEASE ORAL at 20:43

## 2020-01-01 RX ADMIN — HEPARIN SODIUM 5000 UNITS: 5000 INJECTION, SOLUTION INTRAVENOUS; SUBCUTANEOUS at 13:33

## 2020-01-01 RX ADMIN — FORMOTEROL FUMARATE DIHYDRATE 20 MCG: 20 SOLUTION RESPIRATORY (INHALATION) at 06:42

## 2020-01-01 RX ADMIN — FAMOTIDINE 20 MG: 20 TABLET ORAL at 08:59

## 2020-01-01 RX ADMIN — TAMSULOSIN HYDROCHLORIDE 0.4 MG: 0.4 CAPSULE ORAL at 08:59

## 2020-01-01 RX ADMIN — METHYLPREDNISOLONE SODIUM SUCCINATE 40 MG: 40 INJECTION, POWDER, FOR SOLUTION INTRAMUSCULAR; INTRAVENOUS at 00:16

## 2020-01-01 RX ADMIN — METHYLPREDNISOLONE SODIUM SUCCINATE 40 MG: 40 INJECTION, POWDER, FOR SOLUTION INTRAMUSCULAR; INTRAVENOUS at 06:42

## 2020-01-01 RX ADMIN — IPRATROPIUM BROMIDE AND ALBUTEROL SULFATE 3 ML: .5; 3 SOLUTION RESPIRATORY (INHALATION) at 17:02

## 2020-01-01 RX ADMIN — METHYLPREDNISOLONE SODIUM SUCCINATE 40 MG: 40 INJECTION, POWDER, FOR SOLUTION INTRAMUSCULAR; INTRAVENOUS at 17:02

## 2020-01-01 RX ADMIN — METHYLPREDNISOLONE SODIUM SUCCINATE 40 MG: 40 INJECTION, POWDER, FOR SOLUTION INTRAMUSCULAR; INTRAVENOUS at 11:42

## 2020-01-01 NOTE — PROGRESS NOTES
Pulmonary Progress Note    Subjective    Interval History: No acute events overnight.  Patient does feel his breathing is improving slowly.  He continues to have productive cough.    Objective    Vital signs over last 24 hours:  Temp:  [36.3 °C (97.3 °F)-36.8 °C (98.3 °F)] 36.4 °C (97.5 °F)  Heart Rate:  [85-99] 98  Resp:  [18-20] 20  BP: (132-168)/(66-90) 161/83    No intake or output data in the 24 hours ending 01/01/20 0944    Physical Exam:  General: Awake, alert, no acute respiratory distress  Neck: no JVD, no LAD  Lungs: Bilateral rhonchi present  Heart: RRR, S1S2 normal, no murmurs  Abdomen: soft, NTND, BS normal  Extremities: no clubbing or cyanosis, no LE edema  Neuro: grossly normal, moving all extremities    Labs  Lab Results   Component Value Date    WBC 17.34 (H) 01/01/2020    HGB 14.7 01/01/2020    HCT 42.9 01/01/2020     01/01/2020    ALT 14 (L) 11/13/2018    AST 15 11/13/2018     01/01/2020    K 4.8 01/01/2020     01/01/2020    CREATININE 1.2 01/01/2020    BUN 39 (H) 01/01/2020    CO2 27 01/01/2020    INR 1.0 08/21/2017    HGBA1C 6.3 (H) 11/03/2016    MICROALBUR 12.3 10/25/2019       Assessment     Mr. 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     1.  Acute COPD exacerbation- no clear trigger at this point but possibly due to prior tracheobronchitis.  Very slow to improve.  Antibiotics adjusted from azithromycin to Levaquin given concern for possible gram-negative infection such as Pseudomonas     2.  History of dres syndrome     Plan     -Continue Solu-Medrol 40 mg every 6 hours, hope to be able to wean soon  - Continue nebulizers with DuoNeb's every 6 hours  -Continue vest therapy TID.  -Continue budesonide every 12 and Perforomist twice  daily  -Continue Mucinex  - Continue Levaquin to complete a 7 to 10-day course    David Santamaria MD  Pulmonary/Critical Care Fellow  PGY-6

## 2020-01-01 NOTE — PROGRESS NOTES
Hospital Medicine Service -  Daily Progress Note       SUBJECTIVE   Interval History: feeling a bit better today. Wife at bedside. He does still get significantly short of breath just sitting up in bed.      OBJECTIVE      Vital signs in last 24 hours:  Temp:  [36.3 °C (97.3 °F)-36.8 °C (98.3 °F)] 36.6 °C (97.8 °F)  Heart Rate:  [] 100  Resp:  [18-20] 20  BP: (132-168)/(66-83) 139/73  No intake or output data in the 24 hours ending 01/01/20 1316    PHYSICAL EXAMINATION      Physical Exam   Constitutional: He is oriented to person, place, and time. He appears well-developed and well-nourished.   HENT:   Head: Normocephalic and atraumatic.   Eyes: Pupils are equal, round, and reactive to light. EOM are normal.   Neck: Normal range of motion. Neck supple.   Cardiovascular: Normal rate and regular rhythm.   Pulmonary/Chest: Effort normal.   Improved air movement, scattered wheezing and rhonchi   Abdominal: Soft. Bowel sounds are normal.   Musculoskeletal: Normal range of motion.   Neurological: He is alert and oriented to person, place, and time.   Skin: Skin is warm and dry.   Psychiatric: He has a normal mood and affect. His behavior is normal.   Vitals reviewed.     LINES, CATHETERS, DRAINS, AIRWAYS, AND WOUNDS   Lines, Drains, Airways, Wounds:  Peripheral IV 12/23/19 Left Antecubital (Active)   Number of days: 9       Comments:      LABS / IMAGING / TELE      Labs  I have reviewed the patient's pertinent labs. Pertinent labs are within normal limits.    Imaging  I have independently reviewed the pertinent imaging from the last 24 hrs.    ECG/Telemetry  I have independently reviewed the telemetry. No events for the last 24 hours.    ASSESSMENT AND PLAN      GERD (gastroesophageal reflux disease)  Assessment & Plan  - c/w PPI in the setting of steroid use    History of prostate cancer  Assessment & Plan   - c/w finasteride, tamsulosin      Hyperlipidemia  Assessment & Plan  - c/w  atorvastatin    Hypertension  Assessment & Plan  - c/w amlodipine and carvedilol      Chronic kidney disease (CKD), stage III (moderate) (CMS/[facility])  Assessment & Plan  Cr appears at baseline ~1.3    - limit nephrotoxic meds  - trend BMP    * COPD exacerbation (CMS/[facility])  Assessment & Plan  Dr. Montano is pulmonologist    Continues to improve, will continue steroids as is for today, likely wean tomorrow in anticipation of a late week/weekend discharge  - Repeat CXR with no evidence of pneumonia, sputum culture only growing mouth dago  - c/w methylprednisolone 40 mg IV q6h   - completed azithro, continue levofloxacin   - scheduled Duonebs  - c/w budesonide nebs, LAMA  - c/w montelukast, H1 blocker for allergy triggers  - c/w guaifenesin   - respiratory viral panel negative  - pulmonology following, recs appreciated         VTE Assessment: Padua    VTE Prophylaxis Plan: heparin  Code Status: Full Code  Estimated Discharge Date: 1/2/2020  Disposition Planning: home     Mariaa Iyer MD  1/1/2020

## 2020-01-01 NOTE — PLAN OF CARE
Problem: Adult Inpatient Plan of Care  Goal: Plan of Care Review  Outcome: Progressing  Flowsheets (Taken 1/1/2020 0352)  Progress: improving  Plan of Care Reviewed With: patient  Outcome Summary: IV steroids, O2 at 4L, no complaints overnight.

## 2020-01-02 LAB
ANION GAP SERPL CALC-SCNC: 9 MEQ/L (ref 3–15)
BASOPHILS # BLD: 0.09 K/UL (ref 0.01–0.1)
BASOPHILS NFR BLD: 0.5 %
BUN SERPL-MCNC: 41 MG/DL (ref 8–20)
CALCIUM SERPL-MCNC: 8 MG/DL (ref 8.9–10.3)
CHLORIDE SERPL-SCNC: 100 MEQ/L (ref 98–109)
CO2 SERPL-SCNC: 27 MEQ/L (ref 22–32)
CREAT SERPL-MCNC: 1.2 MG/DL
DIFFERENTIAL METHOD BLD: ABNORMAL
EOSINOPHIL # BLD: 0 K/UL (ref 0.04–0.54)
EOSINOPHIL NFR BLD: 0 %
ERYTHROCYTE [DISTWIDTH] IN BLOOD BY AUTOMATED COUNT: 12.9 % (ref 11.6–14.4)
GFR SERPL CREATININE-BSD FRML MDRD: 58 ML/MIN/1.73M*2
GLUCOSE SERPL-MCNC: 223 MG/DL (ref 70–99)
HCT VFR BLDCO AUTO: 43.8 % (ref 40.1–51)
HGB BLD-MCNC: 14.7 G/DL
IMM GRANULOCYTES # BLD AUTO: 0.91 K/UL (ref 0–0.08)
IMM GRANULOCYTES NFR BLD AUTO: 5 %
LYMPHOCYTES # BLD: 0.47 K/UL (ref 1.2–3.5)
LYMPHOCYTES NFR BLD: 2.6 %
MCH RBC QN AUTO: 31.3 PG (ref 28–33.2)
MCHC RBC AUTO-ENTMCNC: 33.6 G/DL (ref 32.2–36.5)
MCV RBC AUTO: 93.4 FL (ref 83–98)
MONOCYTES # BLD: 0.62 K/UL (ref 0.3–1)
MONOCYTES NFR BLD: 3.4 %
NEUTROPHILS # BLD: 15.96 K/UL (ref 1.7–7)
NEUTS SEG NFR BLD: 88.5 %
NRBC BLD-RTO: 0 %
PDW BLD AUTO: 9.9 FL (ref 9.4–12.4)
PLATELET # BLD AUTO: 253 K/UL
POTASSIUM SERPL-SCNC: 4.8 MEQ/L (ref 3.6–5.1)
RBC # BLD AUTO: 4.69 M/UL (ref 4.5–5.8)
SODIUM SERPL-SCNC: 136 MEQ/L (ref 136–144)
WBC # BLD AUTO: 18.05 K/UL

## 2020-01-02 PROCEDURE — 99233 SBSQ HOSP IP/OBS HIGH 50: CPT | Performed by: HOSPITALIST

## 2020-01-02 PROCEDURE — 36415 COLL VENOUS BLD VENIPUNCTURE: CPT | Performed by: INTERNAL MEDICINE

## 2020-01-02 PROCEDURE — 80048 BASIC METABOLIC PNL TOTAL CA: CPT | Performed by: INTERNAL MEDICINE

## 2020-01-02 PROCEDURE — 63700000 HC SELF-ADMINISTRABLE DRUG: Performed by: INTERNAL MEDICINE

## 2020-01-02 PROCEDURE — 21400000 HC ROOM AND CARE CCU/INTERMEDIATE

## 2020-01-02 PROCEDURE — 63600000 HC DRUGS/DETAIL CODE: Performed by: INTERNAL MEDICINE

## 2020-01-02 PROCEDURE — 85025 COMPLETE CBC W/AUTO DIFF WBC: CPT | Performed by: INTERNAL MEDICINE

## 2020-01-02 PROCEDURE — 94667 MNPJ CHEST WALL 1ST: CPT

## 2020-01-02 PROCEDURE — 94664 DEMO&/EVAL PT USE INHALER: CPT

## 2020-01-02 PROCEDURE — 63700000 HC SELF-ADMINISTRABLE DRUG: Performed by: HOSPITALIST

## 2020-01-02 PROCEDURE — 94669 MECHANICAL CHEST WALL OSCILL: CPT

## 2020-01-02 PROCEDURE — 63600000 HC DRUGS/DETAIL CODE: Performed by: HOSPITALIST

## 2020-01-02 PROCEDURE — 25000000 HC PHARMACY GENERAL: Performed by: INTERNAL MEDICINE

## 2020-01-02 RX ORDER — PREDNISONE 20 MG/1
40 TABLET ORAL DAILY
Status: DISCONTINUED | OUTPATIENT
Start: 2020-01-04 | End: 2020-01-04

## 2020-01-02 RX ORDER — ENOXAPARIN SODIUM 100 MG/ML
40 INJECTION SUBCUTANEOUS
Status: DISCONTINUED | OUTPATIENT
Start: 2020-01-02 | End: 2020-01-05 | Stop reason: HOSPADM

## 2020-01-02 RX ADMIN — ENOXAPARIN SODIUM 40 MG: 40 INJECTION SUBCUTANEOUS at 20:22

## 2020-01-02 RX ADMIN — IPRATROPIUM BROMIDE AND ALBUTEROL SULFATE 3 ML: .5; 3 SOLUTION RESPIRATORY (INHALATION) at 18:13

## 2020-01-02 RX ADMIN — FAMOTIDINE 20 MG: 20 TABLET ORAL at 08:43

## 2020-01-02 RX ADMIN — HEPARIN SODIUM 5000 UNITS: 5000 INJECTION, SOLUTION INTRAVENOUS; SUBCUTANEOUS at 13:26

## 2020-01-02 RX ADMIN — ESCITALOPRAM OXALATE 10 MG: 10 TABLET, FILM COATED ORAL at 08:43

## 2020-01-02 RX ADMIN — FLUTICASONE PROPIONATE 2 SPRAY: 50 SPRAY, METERED NASAL at 08:46

## 2020-01-02 RX ADMIN — FORMOTEROL FUMARATE DIHYDRATE 20 MCG: 20 SOLUTION RESPIRATORY (INHALATION) at 06:29

## 2020-01-02 RX ADMIN — HEPARIN SODIUM 5000 UNITS: 5000 INJECTION, SOLUTION INTRAVENOUS; SUBCUTANEOUS at 06:29

## 2020-01-02 RX ADMIN — CARVEDILOL 3.12 MG: 3.12 TABLET, FILM COATED ORAL at 08:43

## 2020-01-02 RX ADMIN — PANTOPRAZOLE SODIUM 20 MG: 20 TABLET, DELAYED RELEASE ORAL at 08:43

## 2020-01-02 RX ADMIN — METHYLPREDNISOLONE SODIUM SUCCINATE 40 MG: 40 INJECTION, POWDER, FOR SOLUTION INTRAMUSCULAR; INTRAVENOUS at 06:29

## 2020-01-02 RX ADMIN — METHYLPREDNISOLONE SODIUM SUCCINATE 40 MG: 40 INJECTION, POWDER, FOR SOLUTION INTRAMUSCULAR; INTRAVENOUS at 13:26

## 2020-01-02 RX ADMIN — BUDESONIDE 0.5 MG: 0.5 INHALANT ORAL at 06:29

## 2020-01-02 RX ADMIN — BUDESONIDE 0.5 MG: 0.5 INHALANT ORAL at 18:11

## 2020-01-02 RX ADMIN — FINASTERIDE 5 MG: 5 TABLET, FILM COATED ORAL at 08:43

## 2020-01-02 RX ADMIN — IPRATROPIUM BROMIDE AND ALBUTEROL SULFATE 3 ML: .5; 3 SOLUTION RESPIRATORY (INHALATION) at 00:09

## 2020-01-02 RX ADMIN — CARVEDILOL 3.12 MG: 3.12 TABLET, FILM COATED ORAL at 18:13

## 2020-01-02 RX ADMIN — IPRATROPIUM BROMIDE AND ALBUTEROL SULFATE 3 ML: .5; 3 SOLUTION RESPIRATORY (INHALATION) at 13:25

## 2020-01-02 RX ADMIN — MONTELUKAST 10 MG: 10 TABLET, FILM COATED ORAL at 21:00

## 2020-01-02 RX ADMIN — GUAIFENESIN 1200 MG: 600 TABLET, EXTENDED RELEASE ORAL at 08:44

## 2020-01-02 RX ADMIN — LEVOFLOXACIN 500 MG: 500 TABLET, FILM COATED ORAL at 08:43

## 2020-01-02 RX ADMIN — METHYLPREDNISOLONE SODIUM SUCCINATE 40 MG: 40 INJECTION, POWDER, FOR SOLUTION INTRAMUSCULAR; INTRAVENOUS at 00:09

## 2020-01-02 RX ADMIN — AMLODIPINE BESYLATE 10 MG: 10 TABLET ORAL at 08:44

## 2020-01-02 RX ADMIN — ATORVASTATIN CALCIUM 10 MG: 10 TABLET, FILM COATED ORAL at 08:43

## 2020-01-02 RX ADMIN — DILTIAZEM HYDROCHLORIDE 120 MG: 120 CAPSULE, COATED, EXTENDED RELEASE ORAL at 08:43

## 2020-01-02 RX ADMIN — IPRATROPIUM BROMIDE AND ALBUTEROL SULFATE 3 ML: .5; 3 SOLUTION RESPIRATORY (INHALATION) at 06:29

## 2020-01-02 RX ADMIN — IPRATROPIUM BROMIDE AND ALBUTEROL SULFATE 3 ML: .5; 3 SOLUTION RESPIRATORY (INHALATION) at 23:24

## 2020-01-02 RX ADMIN — FORMOTEROL FUMARATE DIHYDRATE 20 MCG: 20 SOLUTION RESPIRATORY (INHALATION) at 18:12

## 2020-01-02 RX ADMIN — TAMSULOSIN HYDROCHLORIDE 0.4 MG: 0.4 CAPSULE ORAL at 08:43

## 2020-01-02 RX ADMIN — METHYLPREDNISOLONE SODIUM SUCCINATE 40 MG: 40 INJECTION, POWDER, FOR SOLUTION INTRAMUSCULAR; INTRAVENOUS at 20:22

## 2020-01-02 RX ADMIN — GUAIFENESIN 1200 MG: 600 TABLET, EXTENDED RELEASE ORAL at 20:22

## 2020-01-02 RX ADMIN — CETIRIZINE HYDROCHLORIDE 10 MG: 10 TABLET, FILM COATED ORAL at 20:21

## 2020-01-02 ASSESSMENT — COPD QUESTIONNAIRES
QUESTION2_PHLEGM: 3
QUESTION5_HOMEACTIVITIES: 4
QUESTION7_SLEEPQUALITY: 2
EXERTION INCLINE: 5 - WHEN I WALK UP A HILL OR ONE FLIGHT OF STAIRS I AM VERY BREATHLESS
QUESTION1_COUGHFREQUENCY: 3
QUESTION3_CHESTTIGHTNESS: 4
CAT_TOTALSCORE: 27
QUESTION8_ENERGYLEVEL: 3
QUESTION6_LEAVINGHOUSE: 3

## 2020-01-02 NOTE — PATIENT CARE CONFERENCE
Care Progression Rounds Note  Date: 1/2/2020  Time: 1:49 PM     Patient Name: Zuhair Luong Jr     Medical Record Number: [id number]   YOB: 1935  Sex: Male      Room/Bed: 0152    Admitting Diagnosis: COPD exacerbation (CMS/HCC) [J44.1]   Admit Date/Time: 12/23/2019  9:35 AM    Primary Diagnosis: COPD exacerbation (CMS/HCC)  Principal Problem: COPD exacerbation (CMS/HCC)    GMLOS: 3.6  Anticipated Discharge Date: 1/3/2020    AM-PAC  Mobility Score: 24    Discharge Planning:  Living Arrangements: house  Anticipated Discharge Disposition: home with home health services, home with assistance    Barriers to Discharge:  Barriers to Discharge: Medical issues not resolved, Test pending    Participants:  physical therapy, nursing, , physician, social work/services

## 2020-01-02 NOTE — NURSING NOTE
01/02/20 1526   Inhaler(s) Assessment / Peak Inspiratory Flow (PIF) Measurements    Inhaler Type DPI (Dry Powder Inhaler)   DPI Device Twisthaler   DPI Peak Inspiratory Flow (PIF) 35   Evaluation of inhaler technique completed? Yes   Importance of mouth rinse with steroid inhalers reinforced? Yes   $Demonstration/teaching of optimal inhaler technique completed? Yes   pt. Takes twisthaler at home PIF -35

## 2020-01-02 NOTE — PLAN OF CARE
Problem: Adult Inpatient Plan of Care  Goal: Plan of Care Review  Flowsheets  Taken 1/1/2020 0352 by Amelia Cespedes, RN  Progress: improving  Taken 1/2/2020 1011 by Jocelyn Delatorre RN  Plan of Care Reviewed With: patient  Outcome Summary: PAtient, states, he feels better. No c/o SOB.

## 2020-01-02 NOTE — NURSING NOTE
01/02/20 1507   Positive Expiratory Pressure (PEP)   Type (PEP Therapy) vibratory/oscillatory   Breaths per Cycle (PEP Therapy) 10   Settings (PEP Therapy) PEP 5   Patient Position (PEP Therapy) semi-Tuttle's   Post Treatment Assessment (PEP) congestion decreased

## 2020-01-02 NOTE — NURSING NOTE
01/02/20 1518   STOP-Bang Questionnaire   Do you snore loudly? 0   Do you often feel tired or fatigued after your sleep? 1   Has anyone ever observed you stop breathing in your sleep? 0   Do you have or are you being treated for high blood pressure? 1   Neck Circumference Greater Than (17 inches Male) or (16 inches Female) 0   Recent BMI (Calculated) 25.2   Age older than 50 years old? 1=Yes   Gender - Male 1=Yes   Is BMI greater than 35 kg/m2? 0=No   STOP-Bang Total Score 4   Ef 70%

## 2020-01-02 NOTE — PROGRESS NOTES
Hospital Medicine Service -  Daily Progress Note       SUBJECTIVE   Interval History: Patient confirms HPI and hospital course.  Feels much better today then when he came into the hospital, not much change from yesterday.  Still c/o SOB and cough with yellow sputum.  tolerating diet well.  Appreciated eval from Dr. Montano earlier today. He reports that the visible lower abdominal bruising is from heparin, exam is c/w this.     OBJECTIVE      Vital signs in last 24 hours:  Temp:  [36.3 °C (97.4 °F)-36.8 °C (98.3 °F)] 36.4 °C (97.6 °F)  Heart Rate:  [] 91  Resp:  [18-20] 20  BP: (114-159)/(56-82) 114/82    Intake/Output Summary (Last 24 hours) at 1/2/2020 1710  Last data filed at 1/2/2020 1400  Gross per 24 hour   Intake 240 ml   Output --   Net 240 ml       PHYSICAL EXAMINATION      Physical Exam   Constitutional: He is oriented to person, place, and time. He appears well-nourished.   HENT:   Mouth/Throat: Oropharynx is clear and moist.   Eyes: Pupils are equal, round, and reactive to light. EOM are normal.   Neck: No JVD present.   Cardiovascular: Normal rate and regular rhythm.   Pulmonary/Chest:   loud wheezing b/l with good air entry.     Abdominal: Soft. Bowel sounds are normal. He exhibits no distension. There is no tenderness.   Musculoskeletal: Normal range of motion. He exhibits no edema.   Neurological: He is alert and oriented to person, place, and time.   Psychiatric: He has a normal mood and affect. His behavior is normal. Judgment and thought content normal.   Vitals reviewed.     LINES, CATHETERS, DRAINS, AIRWAYS, AND WOUNDS   Lines, Drains, Airways, Wounds:  Peripheral IV 12/23/19 Left Antecubital (Active)   Number of days: 10       Comments:      LABS / IMAGING / TELE      Labs  I have reviewed the patient's pertinent labs.  Significant abnormals are renal function stable, cbc with chronic leukocytosis from prednisone.        ASSESSMENT AND PLAN      * COPD exacerbation (CMS/[facility])  Assessment  & Plan  Dr. Montano is pulmonologist    Continues to improve, will continue steroids as is for today, likely wean tomorrow in anticipation of a late week/weekend discharge  - Repeat CXR with no evidence of pneumonia, sputum culture only growing mouth dago  - monitoring BMP for blood sugars and renal function while on antibiotics.  CBC has been stable for many days with reactive leukocytosis 22 steroids, can stop trending for now and repeat as an outpatient at least a few days after steroid taper is completed.  -methylprednisolone 40 mg IV q6h started 12/26/19@1800.  Tapered to 40mg Q12 by 1/3/20 and plan to transition to prednisone 40mg on 1/4/20  - completed azithro,   - complete 5 day course of Levaquin 12/30/19-1/3/20  - scheduled Duonebs  - c/w budesonide nebs, LAMA  - c/w montelukast, H1 blocker for allergy triggers  - c/w guaifenesin   - respiratory viral panel negative  - pulmonology following, recs appreciated    GERD (gastroesophageal reflux disease)  Assessment & Plan  - c/w PPI in the setting of steroid use    History of prostate cancer  Assessment & Plan   - c/w finasteride, tamsulosin      Hyperlipidemia  Assessment & Plan  - c/w atorvastatin    Hypertension  Assessment & Plan  - c/w amlodipine and carvedilol      Chronic kidney disease (CKD), stage III (moderate) (CMS/HCC)  Assessment & Plan  Cr appears at baseline ~1.3    - limit nephrotoxic meds  - trend BMP     VTE Assessment: Padua    VTE Prophylaxis Plan: can change to lovenox as at this point renal function has been stable  Code Status: Full Code  Estimated Discharge Date: 1/4/2020  Disposition Planning: patient would like to go home     David Hernández MD  1/2/2020

## 2020-01-02 NOTE — PROGRESS NOTES
Pulmonary Progress Note    Subjective    Interval History: He feels overall significantly better, but still with cough with yellow sputum and wheezing.     Objective    Vital signs in last 24 hours:  Temp:  [36.3 °C (97.4 °F)-36.8 °C (98.3 °F)] 36.6 °C (97.9 °F)  Heart Rate:  [] 75  Resp:  [18-20] 20  BP: (137-159)/(56-79) 142/56      Intake/Output Summary (Last 24 hours) at 1/2/2020 1445  Last data filed at 1/2/2020 1400  Gross per 24 hour   Intake 240 ml   Output --   Net 240 ml       Physical Exam:    General - well appearing, in no acute distress  HEENT - OP clear without exudates or lesions  Neck - no lymphadenopathy or masses  Lungs - diffuse bilateral rhonchi, no wheezing  Heart - RRR; no murmurs  Abd - soft, NTND  Ext - no cyanosis, clubbing, edema  Skin - no rashes or lesions  Neuro - alert and oriented; normal affect    Diagnostic Data:    Labs:  I have personally reviewed all pertinent patient laboratory results. Labs of note discussed below:  Wbc 17.3    12/29 sputum cx neg    Imaging  I have independently reviewed all pertinent imaging since our last encounter and pertinent findings are discussed below:  12/30 CXR w/o infiltrate    Assessment & Plan    Acute COPD exacerbation, very slow to resolve, likely due to acute tracheobronchitis  H/o Cdiff in the past  H/o DRESS in the past from both IV and po vanco (would also avoid augmentin and flagyl as he may have had DRESS from these in past; has tolerated quinolones)    - would taper steroids to prednisone 40mg q12 and if remains stable on this, to prednisone 40mg/day on 1/4 with hope to d/c on slow taper  - complete 5 day course of levaquin (to complete last dose tomorrow on 1/3)  - watch for Cdiff as he has h/o this, but cannot take po vanco or flagyl    Reva Montano MD

## 2020-01-03 LAB
ANION GAP SERPL CALC-SCNC: 7 MEQ/L (ref 3–15)
BUN SERPL-MCNC: 38 MG/DL (ref 8–20)
CALCIUM SERPL-MCNC: 7.8 MG/DL (ref 8.9–10.3)
CHLORIDE SERPL-SCNC: 103 MEQ/L (ref 98–109)
CO2 SERPL-SCNC: 27 MEQ/L (ref 22–32)
CREAT SERPL-MCNC: 1.2 MG/DL
GFR SERPL CREATININE-BSD FRML MDRD: 58 ML/MIN/1.73M*2
GLUCOSE SERPL-MCNC: 198 MG/DL (ref 70–99)
POTASSIUM SERPL-SCNC: 4.7 MEQ/L (ref 3.6–5.1)
SODIUM SERPL-SCNC: 137 MEQ/L (ref 136–144)

## 2020-01-03 PROCEDURE — 97161 PT EVAL LOW COMPLEX 20 MIN: CPT

## 2020-01-03 PROCEDURE — 94669 MECHANICAL CHEST WALL OSCILL: CPT

## 2020-01-03 PROCEDURE — 63700000 HC SELF-ADMINISTRABLE DRUG: Performed by: HOSPITALIST

## 2020-01-03 PROCEDURE — 80048 BASIC METABOLIC PNL TOTAL CA: CPT | Performed by: INTERNAL MEDICINE

## 2020-01-03 PROCEDURE — 36415 COLL VENOUS BLD VENIPUNCTURE: CPT | Performed by: INTERNAL MEDICINE

## 2020-01-03 PROCEDURE — 25000000 HC PHARMACY GENERAL: Performed by: INTERNAL MEDICINE

## 2020-01-03 PROCEDURE — 63600000 HC DRUGS/DETAIL CODE: Performed by: HOSPITALIST

## 2020-01-03 PROCEDURE — 21400000 HC ROOM AND CARE CCU/INTERMEDIATE

## 2020-01-03 PROCEDURE — 63700000 HC SELF-ADMINISTRABLE DRUG: Performed by: INTERNAL MEDICINE

## 2020-01-03 PROCEDURE — 99233 SBSQ HOSP IP/OBS HIGH 50: CPT | Performed by: HOSPITALIST

## 2020-01-03 RX ADMIN — CETIRIZINE HYDROCHLORIDE 10 MG: 10 TABLET, FILM COATED ORAL at 20:27

## 2020-01-03 RX ADMIN — ATORVASTATIN CALCIUM 10 MG: 10 TABLET, FILM COATED ORAL at 08:23

## 2020-01-03 RX ADMIN — METHYLPREDNISOLONE SODIUM SUCCINATE 40 MG: 40 INJECTION, POWDER, FOR SOLUTION INTRAMUSCULAR; INTRAVENOUS at 20:28

## 2020-01-03 RX ADMIN — GUAIFENESIN 1200 MG: 600 TABLET, EXTENDED RELEASE ORAL at 08:32

## 2020-01-03 RX ADMIN — FORMOTEROL FUMARATE DIHYDRATE 20 MCG: 20 SOLUTION RESPIRATORY (INHALATION) at 06:14

## 2020-01-03 RX ADMIN — FINASTERIDE 5 MG: 5 TABLET, FILM COATED ORAL at 08:23

## 2020-01-03 RX ADMIN — MONTELUKAST 10 MG: 10 TABLET, FILM COATED ORAL at 20:28

## 2020-01-03 RX ADMIN — GUAIFENESIN 1200 MG: 600 TABLET, EXTENDED RELEASE ORAL at 20:28

## 2020-01-03 RX ADMIN — BUDESONIDE 0.5 MG: 0.5 INHALANT ORAL at 06:12

## 2020-01-03 RX ADMIN — METHYLPREDNISOLONE SODIUM SUCCINATE 40 MG: 40 INJECTION, POWDER, FOR SOLUTION INTRAMUSCULAR; INTRAVENOUS at 08:23

## 2020-01-03 RX ADMIN — CARVEDILOL 3.12 MG: 3.12 TABLET, FILM COATED ORAL at 17:34

## 2020-01-03 RX ADMIN — IPRATROPIUM BROMIDE AND ALBUTEROL SULFATE 3 ML: .5; 3 SOLUTION RESPIRATORY (INHALATION) at 17:42

## 2020-01-03 RX ADMIN — DILTIAZEM HYDROCHLORIDE 120 MG: 120 CAPSULE, COATED, EXTENDED RELEASE ORAL at 08:23

## 2020-01-03 RX ADMIN — TAMSULOSIN HYDROCHLORIDE 0.4 MG: 0.4 CAPSULE ORAL at 08:23

## 2020-01-03 RX ADMIN — ESCITALOPRAM OXALATE 10 MG: 10 TABLET, FILM COATED ORAL at 08:32

## 2020-01-03 RX ADMIN — FORMOTEROL FUMARATE DIHYDRATE 20 MCG: 20 SOLUTION RESPIRATORY (INHALATION) at 17:39

## 2020-01-03 RX ADMIN — FLUTICASONE PROPIONATE 2 SPRAY: 50 SPRAY, METERED NASAL at 08:40

## 2020-01-03 RX ADMIN — LEVOFLOXACIN 500 MG: 500 TABLET, FILM COATED ORAL at 08:23

## 2020-01-03 RX ADMIN — IPRATROPIUM BROMIDE AND ALBUTEROL SULFATE 3 ML: .5; 3 SOLUTION RESPIRATORY (INHALATION) at 12:45

## 2020-01-03 RX ADMIN — BUDESONIDE 0.5 MG: 0.5 INHALANT ORAL at 17:33

## 2020-01-03 RX ADMIN — PANTOPRAZOLE SODIUM 20 MG: 20 TABLET, DELAYED RELEASE ORAL at 08:23

## 2020-01-03 RX ADMIN — FAMOTIDINE 20 MG: 20 TABLET ORAL at 08:23

## 2020-01-03 RX ADMIN — AMLODIPINE BESYLATE 10 MG: 10 TABLET ORAL at 08:23

## 2020-01-03 RX ADMIN — CARVEDILOL 3.12 MG: 3.12 TABLET, FILM COATED ORAL at 08:23

## 2020-01-03 RX ADMIN — ENOXAPARIN SODIUM 40 MG: 40 INJECTION SUBCUTANEOUS at 17:33

## 2020-01-03 RX ADMIN — IPRATROPIUM BROMIDE AND ALBUTEROL SULFATE 3 ML: .5; 3 SOLUTION RESPIRATORY (INHALATION) at 06:12

## 2020-01-03 NOTE — PROGRESS NOTES
Pulmonology Daily Progress Note    Subjective/Objective:  Subjective     Interval History: complains of ongoing cough but overall feels better than on admission..     Objective     Vital signs in last 24 hours:  Temp:  [36.3 °C (97.4 °F)-36.9 °C (98.4 °F)] 36.4 °C (97.6 °F)  Heart Rate:  [75-96] 84  Resp:  [18-20] 18  BP: (114-142)/(56-94) 141/76    Oxygen:  Oxygen Therapy: Supplemental oxygen  O2 Delivery Method: Nasal cannula  FiO2 (%) (Set): 40 %  O2 Flow Rate (L/min): 4 L/min    I/O Brief:    Intake/Output Summary (Last 24 hours) at 1/3/2020 1011  Last data filed at 1/2/2020 1400  Gross per 24 hour   Intake 240 ml   Output --   Net 240 ml     I/O this shift:  No intake/output data recorded.    Labs:  WBC 18,000    Imaging:  I have reviewed the Imaging from the last 24 hrs.    VTE Assessment: I have reassessed and the patient's VTE risk and treatment plan is appropriate.    Physical Exam:  Awake and alert, in bed, with coarse cough  Lungs with scattered wet rhonchi  RRR  No edema    Assessment & Plan     Acute COPD exacerbation, very slow to resolve, likely due to acute tracheobronchitis  H/o Cdiff in the past  H/o DRESS in the past from both IV and po vanco (would also avoid augmentin and flagyl as he may have had DRESS from these in past; has tolerated quinolones)     - would continue Solumedrol 40mg q12 today and if OK tomorrow, to prednisone 40mg/day on 1/4 with hope to d/c on slow taper  - complete 5 day course of levaquin today Gurwinder 3  - watch for Cdiff as he has h/o this, but cannot take po vanco or flagyl  - continue Singulair, nebs, Flonase     If discharged over the weekend, follow up with Dr Montano in one week    Maria Luisa Albarran MD

## 2020-01-03 NOTE — PATIENT CARE CONFERENCE
Care Progression Rounds Note  Date: 1/3/2020  Time: 12:22 PM     Patient Name: Zuhair Luong Jr     Medical Record Number: [id number]   YOB: 1935  Sex: Male      Room/Bed: 0152    Admitting Diagnosis: COPD exacerbation (CMS/HCC) [J44.1]   Admit Date/Time: 12/23/2019  9:35 AM    Primary Diagnosis: COPD exacerbation (CMS/HCC)  Principal Problem: COPD exacerbation (CMS/HCC)    GMLOS: 3.6  Anticipated Discharge Date: 1/4/2020    AM-PAC  Mobility Score: 24    Discharge Planning:  Living Arrangements: house  Anticipated Discharge Disposition: home with home health services, home with assistance    Barriers to Discharge:  Barriers to Discharge: Medical issues not resolved, Test pending    Participants:  physical therapy, nursing, , physician, social work/services

## 2020-01-03 NOTE — SUBJECTIVE & OBJECTIVE
Subjective     Interval History: complains of ongoing cough but overall feels better than on admission..     Objective     Vital signs in last 24 hours:  Temp:  [36.3 °C (97.4 °F)-36.9 °C (98.4 °F)] 36.4 °C (97.6 °F)  Heart Rate:  [75-96] 84  Resp:  [18-20] 18  BP: (114-142)/(56-94) 141/76    Oxygen:  Oxygen Therapy: Supplemental oxygen  O2 Delivery Method: Nasal cannula  FiO2 (%) (Set): 40 %  O2 Flow Rate (L/min): 4 L/min    I/O Brief:    Intake/Output Summary (Last 24 hours) at 1/3/2020 1011  Last data filed at 1/2/2020 1400  Gross per 24 hour   Intake 240 ml   Output --   Net 240 ml     I/O this shift:  No intake/output data recorded.    Labs:  WBC 18,000    Imaging:  I have reviewed the Imaging from the last 24 hrs.    VTE Assessment: I have reassessed and the patient's VTE risk and treatment plan is appropriate.    Physical Exam:  Awake and alert, in bed, with coarse cough  Lungs with scattered wet rhonchi  RRR  No edema    Assessment & Plan     Acute COPD exacerbation, very slow to resolve, likely due to acute tracheobronchitis  H/o Cdiff in the past  H/o DRESS in the past from both IV and po vanco (would also avoid augmentin and flagyl as he may have had DRESS from these in past; has tolerated quinolones)     - would continue Solumedrol 40mg q12 today and if OK tomorrow, to prednisone 40mg/day on 1/4 with hope to d/c on slow taper  - complete 5 day course of levaquin today Gurwinder 3  - watch for Cdiff as he has h/o this, but cannot take po vanco or flagyl  - continue Singulair, nebs, Flonase     If discharged over the weekend, follow up with Dr Motnano in one week    Maria Luisa Albarran MD

## 2020-01-03 NOTE — PLAN OF CARE
Problem: Adult Inpatient Plan of Care  Goal: Plan of Care Review  Flowsheets (Taken 1/3/2020 8967)  Progress: improving  Plan of Care Reviewed With: other (see comments) (medical team)  Outcome Summary: Patient's RACHEL is S/S home with [facility]

## 2020-01-03 NOTE — PLAN OF CARE
Problem: Adult Inpatient Plan of Care  Goal: Plan of Care Review  Outcome: Progressing  Flowsheets (Taken 1/3/2020 0901)  Progress: improving  Plan of Care Reviewed With: patient  Outcome Summary: Pt tolerated activity with less shortness of breath

## 2020-01-03 NOTE — PROGRESS NOTES
Hospital Medicine Service -  Daily Progress Note       SUBJECTIVE   Interval History: Patient eating breakfast.  He feels his SOB is much improved.  no n/v/c/d, had normal BM this morning.  Feels a little weak and shaky when  ambulating, he attributes this to his medications.       OBJECTIVE      Vital signs in last 24 hours:  Temp:  [36.3 °C (97.4 °F)-36.9 °C (98.4 °F)] 36.4 °C (97.6 °F)  Heart Rate:  [75-96] 84  Resp:  [18-20] 18  BP: (114-142)/(56-94) 141/76    Intake/Output Summary (Last 24 hours) at 1/3/2020 0932  Last data filed at 1/2/2020 1400  Gross per 24 hour   Intake 240 ml   Output --   Net 240 ml       PHYSICAL EXAMINATION      Physical Exam   Constitutional: He is oriented to person, place, and time. He appears well-nourished.   HENT:   Mouth/Throat: Oropharynx is clear and moist.   Eyes: No scleral icterus.   Neck: No JVD present.   Cardiovascular:   tachy, regular   Pulmonary/Chest:   wheezing and coarse breath sounds less than yesterday with improved air entry, but still fairly significant exam.   Abdominal: Soft. Bowel sounds are normal.   Musculoskeletal: Normal range of motion. He exhibits no edema.   Neurological: He is alert and oriented to person, place, and time.   Psychiatric: He has a normal mood and affect. His behavior is normal. Judgment and thought content normal.   Vitals reviewed.     LINES, CATHETERS, DRAINS, AIRWAYS, AND WOUNDS   Lines, Drains, Airways, Wounds:  Peripheral IV 12/23/19 Left Antecubital (Active)   Number of days: 11       Comments:      LABS / IMAGING / TELE      Labs  I have reviewed the patient's labs to the time of note. No new clinical concern.        ASSESSMENT AND PLAN      * COPD exacerbation (CMS/[facility])  Assessment & Plan  Dr. Montano is pulmonologist    Continues to improve, will continue steroids as is for today, likely wean tomorrow in anticipation of a late week/weekend discharge  - Repeat CXR with no evidence of pneumonia, sputum culture only growing  mouth dago  - monitoring BMP for blood sugars and renal function while on antibiotics.  CBC has been stable for many days with reactive leukocytosis 22 steroids, can stop trending for now and repeat as an outpatient at least a few days after steroid taper is completed.  -methylprednisolone 40 mg IV q6h started 12/26/19@1800.  Tapered to 40mg Q12 by 1/3/20 and plan to transition to prednisone 40mg on 1/4/20  - completed azithro,   - complete 5 day course of Levaquin 12/30/19-1/3/20  - scheduled Duonebs  - c/w budesonide nebs, LAMA  - c/w montelukast, H1 blocker for allergy triggers  - c/w guaifenesin   - respiratory viral panel negative  - pulmonology following, recs appreciated    GERD (gastroesophageal reflux disease)  Assessment & Plan  - c/w PPI in the setting of steroid use    History of prostate cancer  Assessment & Plan   - c/w finasteride, tamsulosin      Hyperlipidemia  Assessment & Plan  - c/w atorvastatin    Hypertension  Assessment & Plan  - c/w amlodipine and carvedilol        Chronic kidney disease (CKD), stage III (moderate) (CMS/HCC)  Assessment & Plan  Cr appears at baseline ~1.3    - limit nephrotoxic meds  - trend BMP       VTE Assessment: Padua    VTE Prophylaxis Plan: lovenox  Code Status: Full Code  Estimated Discharge Date: 1/4/2020  Disposition Planning: home, patient reports family can come up tomorrow to help bring him home     David Hernández MD  1/3/2020

## 2020-01-03 NOTE — PLAN OF CARE
Problem: Adult Inpatient Plan of Care  Goal: Plan of Care Review  Outcome: Progressing  Flowsheets (Taken 1/2/2020 1631)  Progress: improving  Plan of Care Reviewed With: patient  Outcome Summary: pt hopes to get dc'd soon

## 2020-01-04 LAB
ANION GAP SERPL CALC-SCNC: 12 MEQ/L (ref 3–15)
BUN SERPL-MCNC: 33 MG/DL (ref 8–20)
CALCIUM SERPL-MCNC: 8 MG/DL (ref 8.9–10.3)
CHLORIDE SERPL-SCNC: 98 MEQ/L (ref 98–109)
CO2 SERPL-SCNC: 26 MEQ/L (ref 22–32)
CREAT SERPL-MCNC: 1.2 MG/DL
GFR SERPL CREATININE-BSD FRML MDRD: 58 ML/MIN/1.73M*2
GLUCOSE SERPL-MCNC: 194 MG/DL (ref 70–99)
POTASSIUM SERPL-SCNC: 4.9 MEQ/L (ref 3.6–5.1)
SODIUM SERPL-SCNC: 136 MEQ/L (ref 136–144)

## 2020-01-04 PROCEDURE — 97535 SELF CARE MNGMENT TRAINING: CPT | Mod: GO,CO

## 2020-01-04 PROCEDURE — 63700000 HC SELF-ADMINISTRABLE DRUG: Performed by: INTERNAL MEDICINE

## 2020-01-04 PROCEDURE — 63600000 HC DRUGS/DETAIL CODE: Performed by: HOSPITALIST

## 2020-01-04 PROCEDURE — 99232 SBSQ HOSP IP/OBS MODERATE 35: CPT | Performed by: INTERNAL MEDICINE

## 2020-01-04 PROCEDURE — 80048 BASIC METABOLIC PNL TOTAL CA: CPT | Performed by: INTERNAL MEDICINE

## 2020-01-04 PROCEDURE — 36415 COLL VENOUS BLD VENIPUNCTURE: CPT | Performed by: INTERNAL MEDICINE

## 2020-01-04 PROCEDURE — 25000000 HC PHARMACY GENERAL: Performed by: INTERNAL MEDICINE

## 2020-01-04 PROCEDURE — 21400000 HC ROOM AND CARE CCU/INTERMEDIATE

## 2020-01-04 RX ORDER — PREDNISONE 20 MG/1
40 TABLET ORAL 2 TIMES DAILY
Status: DISCONTINUED | OUTPATIENT
Start: 2020-01-04 | End: 2020-01-05 | Stop reason: HOSPADM

## 2020-01-04 RX ADMIN — CARVEDILOL 3.12 MG: 3.12 TABLET, FILM COATED ORAL at 09:17

## 2020-01-04 RX ADMIN — ENOXAPARIN SODIUM 40 MG: 40 INJECTION SUBCUTANEOUS at 18:11

## 2020-01-04 RX ADMIN — IPRATROPIUM BROMIDE AND ALBUTEROL SULFATE 3 ML: .5; 3 SOLUTION RESPIRATORY (INHALATION) at 13:49

## 2020-01-04 RX ADMIN — ATORVASTATIN CALCIUM 10 MG: 10 TABLET, FILM COATED ORAL at 09:18

## 2020-01-04 RX ADMIN — FLUTICASONE PROPIONATE 2 SPRAY: 50 SPRAY, METERED NASAL at 09:31

## 2020-01-04 RX ADMIN — PANTOPRAZOLE SODIUM 20 MG: 20 TABLET, DELAYED RELEASE ORAL at 09:18

## 2020-01-04 RX ADMIN — FAMOTIDINE 20 MG: 20 TABLET ORAL at 09:16

## 2020-01-04 RX ADMIN — TAMSULOSIN HYDROCHLORIDE 0.4 MG: 0.4 CAPSULE ORAL at 09:40

## 2020-01-04 RX ADMIN — IPRATROPIUM BROMIDE AND ALBUTEROL SULFATE 3 ML: .5; 3 SOLUTION RESPIRATORY (INHALATION) at 23:45

## 2020-01-04 RX ADMIN — CARVEDILOL 3.12 MG: 3.12 TABLET, FILM COATED ORAL at 18:10

## 2020-01-04 RX ADMIN — IPRATROPIUM BROMIDE AND ALBUTEROL SULFATE 3 ML: .5; 3 SOLUTION RESPIRATORY (INHALATION) at 06:23

## 2020-01-04 RX ADMIN — AMLODIPINE BESYLATE 10 MG: 10 TABLET ORAL at 09:16

## 2020-01-04 RX ADMIN — IPRATROPIUM BROMIDE AND ALBUTEROL SULFATE 3 ML: .5; 3 SOLUTION RESPIRATORY (INHALATION) at 18:12

## 2020-01-04 RX ADMIN — FORMOTEROL FUMARATE DIHYDRATE 20 MCG: 20 SOLUTION RESPIRATORY (INHALATION) at 06:23

## 2020-01-04 RX ADMIN — PREDNISONE 40 MG: 20 TABLET ORAL at 09:17

## 2020-01-04 RX ADMIN — ESCITALOPRAM OXALATE 10 MG: 10 TABLET, FILM COATED ORAL at 09:17

## 2020-01-04 RX ADMIN — ALENDRONATE SODIUM 70 MG: 70 TABLET ORAL at 06:22

## 2020-01-04 RX ADMIN — GUAIFENESIN 1200 MG: 600 TABLET, EXTENDED RELEASE ORAL at 09:17

## 2020-01-04 RX ADMIN — FORMOTEROL FUMARATE DIHYDRATE 20 MCG: 20 SOLUTION RESPIRATORY (INHALATION) at 18:37

## 2020-01-04 RX ADMIN — MONTELUKAST 10 MG: 10 TABLET, FILM COATED ORAL at 22:27

## 2020-01-04 RX ADMIN — CETIRIZINE HYDROCHLORIDE 10 MG: 10 TABLET, FILM COATED ORAL at 22:28

## 2020-01-04 RX ADMIN — BUDESONIDE 0.5 MG: 0.5 INHALANT ORAL at 18:11

## 2020-01-04 RX ADMIN — FINASTERIDE 5 MG: 5 TABLET, FILM COATED ORAL at 09:18

## 2020-01-04 RX ADMIN — BUDESONIDE 0.5 MG: 0.5 INHALANT ORAL at 06:23

## 2020-01-04 RX ADMIN — PREDNISONE 40 MG: 20 TABLET ORAL at 20:09

## 2020-01-04 RX ADMIN — GUAIFENESIN 1200 MG: 600 TABLET, EXTENDED RELEASE ORAL at 20:09

## 2020-01-04 RX ADMIN — DILTIAZEM HYDROCHLORIDE 120 MG: 120 CAPSULE, COATED, EXTENDED RELEASE ORAL at 09:18

## 2020-01-04 RX ADMIN — IPRATROPIUM BROMIDE AND ALBUTEROL SULFATE 3 ML: .5; 3 SOLUTION RESPIRATORY (INHALATION) at 00:07

## 2020-01-04 ASSESSMENT — COGNITIVE AND FUNCTIONAL STATUS - GENERAL
TOILETING: 4 - NONE
HELP NEEDED FOR PERSONAL GROOMING: 4 - NONE
DRESSING REGULAR UPPER BODY CLOTHING: 4 - NONE
DRESSING REGULAR LOWER BODY CLOTHING: 3 - A LITTLE
EATING MEALS: 4 - NONE
HELP NEEDED FOR BATHING: 3 - A LITTLE
AFFECT: WFL

## 2020-01-04 NOTE — PLAN OF CARE
Problem: Adult Inpatient Plan of Care  Goal: Plan of Care Review  Outcome: Progressing  Flowsheets (Taken 1/4/2020 2861)  Progress: improving  Plan of Care Reviewed With: other (see comments) (Dr. Peter)  Outcome Summary: Per Dr. Peter, patient will be ready for DC to home tomorrow; call to Jeronimo at [facility] to notify of DC 1/5/2020.

## 2020-01-04 NOTE — PLAN OF CARE
Problem: Adult Inpatient Plan of Care  Goal: Plan of Care Review  Outcome: Progressing  Flowsheets (Taken 1/3/2020 2046)  Progress: improving  Plan of Care Reviewed With: patient  Outcome Summary: pt states is feeling better each day.

## 2020-01-04 NOTE — PROGRESS NOTES
Hospital Medicine Service -  Daily Progress Note       SUBJECTIVE   Interval History:     Still coughing up some mucus. Doesn't feel ready today. Wants to wait one more day. He is admittedly anxious about leaving.     OBJECTIVE      Vital signs in last 24 hours:  Temp:  [36.9 °C (98.5 °F)-37 °C (98.6 °F)] 37 °C (98.6 °F)  Heart Rate:  [] 101  Resp:  [16-20] 16  BP: (138-149)/(72-89) 149/89    Intake/Output Summary (Last 24 hours) at 1/4/2020 1122  Last data filed at 1/3/2020 1300  Gross per 24 hour   Intake 240 ml   Output --   Net 240 ml       PHYSICAL EXAMINATION      Physical Exam   Constitutional: He is oriented to person, place, and time. He appears well-developed and well-nourished. No distress.   HENT:   Head: Normocephalic and atraumatic.   Eyes: EOM are normal. Right eye exhibits no discharge. Left eye exhibits no discharge.   Neck: Normal range of motion. No JVD present.   Cardiovascular: Regular rhythm and normal heart sounds. Exam reveals no gallop and no friction rub.   No murmur heard.  Pulmonary/Chest: Effort normal. No accessory muscle usage. No tachypnea. No respiratory distress. He has wheezes. He has no rhonchi. He has no rales.   Abdominal: Soft. Bowel sounds are normal. He exhibits no distension. There is no tenderness.   Musculoskeletal: Normal range of motion. He exhibits no edema.   Neurological: He is alert and oriented to person, place, and time.   Skin: Skin is warm and dry.   Psychiatric: He has a normal mood and affect. His behavior is normal.   Nursing note and vitals reviewed.        LINES, CATHETERS, DRAINS, AIRWAYS, AND WOUNDS   Lines, Drains, Airways, Wounds:  Peripheral IV 01/03/20 Posterior;Proximal;Right Forearm (Active)   Number of days: 1       Comments:      LABS / IMAGING / TELE      Labs  Results from last 7 days   Lab Units 01/04/20  0718 01/03/20  0713 01/02/20  0623   SODIUM mEQ/L 136 137 136   POTASSIUM mEQ/L 4.9 4.7 4.8   CHLORIDE mEQ/L 98 103 100   CO2 mEQ/L  26 27 27   BUN mg/dL 33* 38* 41*   CREATININE mg/dL 1.2 1.2 1.2   GLUCOSE mg/dL 194* 198* 223*   CALCIUM mg/dL 8.0* 7.8* 8.0*     Results from last 7 days   Lab Units 20  0623 20  0702 19  0647   WBC K/uL 18.05* 17.34* 18.18*   HEMOGLOBIN g/dL 14.7 14.7 14.5   HEMATOCRIT % 43.8 42.9 42.8   PLATELETS K/uL 253 252 264     Imaging  I have independently reviewed the pertinent imaging from the last 24 hrs.    ECG/Telemetry  I have independently reviewed the telemetry. No events for the last 24 hours.    ASSESSMENT AND PLAN      * COPD exacerbation (CMS/[facility])  Assessment & Plan  Follows with Dr. Montano  methylprednisolone transitioned to BID prednisone today  DC  - he doesn't feel well enough today  He has completed azithromycin and 5 days of levofloxacin  Continue with neb treatment and pulmonary toilet    GERD (gastroesophageal reflux disease)  Assessment & Plan  - c/w PPI in the setting of steroid use    History of prostate cancer  Assessment & Plan   - c/w finasteride, tamsulosin      Hyperlipidemia  Assessment & Plan  - c/w atorvastatin    Hypertension  Assessment & Plan  - c/w amlodipine and carvedilol      Chronic kidney disease (CKD), stage III (moderate) (CMS/[facility])  Assessment & Plan  Cr appears at baseline ~1.3    - limit nephrotoxic meds  - trend BMP         VTE Assessment: Padua    VTE Prophylaxis Plan: Enoxaparin  Code Status: Full Code  Estimated Discharge Date: 2020  Disposition Plannin/5     Jono Peter DO  2020

## 2020-01-04 NOTE — PROGRESS NOTES
Patient: Zuhair Luong Jr  Location: [place] [address]  MRN: [id number]  Today's date: 1/4/2020  Pt left in bed with call bell.  Hospital Course  Ed is a 84 y.o. male admitted on 12/23/2019 with COPD exacerbation (CMS/HCC) [J44.1]. Principal problem is COPD exacerbation (CMS/HCC).    Ed has a past medical history of COPD (chronic obstructive pulmonary disease) (CMS/HCC), Diverticulitis of colon, Emphysema lung (CMS/HCC), Hypertension, and Prostate cancer (CMS/HCC).    Therapy Pain/Vitals - 01/04/20 1439        Pain/Comfort/Sleep    Pain Charting Type  Pain Assessment     Preferred Pain Scale  number (Numeric Rating Pain Scale)           Prior Living Environment      Most Recent Value   Living Arrangements  house   Living Environment Comment  lives with wife, 2SH, 1st fl set up, 1STE          Prior Level of Function      Most Recent Value   Ambulation  independent   Transferring  independent   Toileting  independent   Bathing  independent   Dressing  independent   Eating  independent   Communication  understands/communicates without difficulty   Swallowing  swallows foods/liquids without difficulty   Prior Level of Function Comment  ambs without AD, on home 2LNC O2   Equipment Currently Used at Home  oxygen          OT Evaluation and Treatment - 01/04/20 1046        Time Calculation    Start Time  1046     Stop Time  1058     Time Calculation (min)  12 min        Session Details    Document Type  daily treatment/progress note     Mode of Treatment  occupational therapy        General Information    Patient Profile Reviewed?  yes     General Observations of Patient  Pt rec'd in bed      Existing Precautions/Restrictions  oxygen therapy device and L/min        Cognition/Psychosocial    Affect/Mental Status (Cognitive)  WFL     Orientation Status (Cognition)  oriented x 3        Bed Mobility    [place], Supine to Sit  modified independence     [place], Sit to Supine  modified  independence        Transfers    Transfers  shower transfer        Sit to Stand Transfer    [place], Sit to Stand Transfer  independent     Assistive Device  none        Stand to Sit Transfer    [place], Stand to Sit Transfer  independent     Assistive Device  none        Toilet Transfer    [place], Toilet Transfer  modified independence     Assistive Device  grab bars/safety frame        Shower Transfer    Assistive Device  shower chair     Comment  Pt has chr for stall [facility] Pt ed on safety for bathing        Balance    Balance Assessment  sitting static balance;standing static balance     Static Sitting Balance  WFL     Static Standing Balance  WFL        Lower Body Dressing    Self-Performance  dons/doffs left sock;dons/doffs right sock     [place]  modified independence        Toileting    [place]  modified independence        AM-PAC (TM) - ADL (Current Function)    Putting on and taking off regular lower body clothing?  3 - A Little     Bathing?  3 - A Little     Toileting?  4 - None     Putting on/taking off regular upper body clothing?  4 - None     How much help for taking care of personal grooming?  4 - None     Eating meals?  4 - None     AM-PAC (TM) ADL Score  22        Therapy Assessment/Plan (OT)    Rehab Potential (OT)  good, to achieve stated therapy goals        Progress Summary (OT)    Daily Outcome Statement (OT)  Pt shows progress with all txfers and ADL tasks.  Plan is for home with spouse assist when medically stable.  Anticipate cont progress.  Will D/C Ot services at this time.        Therapy Plan Review/Discharge Plan (OT)    Anticipated Equipment Needs At Discharge (OT)  --    has shr chr    OT Recommended Discharge Disposition  home with assist           Pain Assessment/Intervention  Pain Charting Type: Pain Assessment        Education provided this session. See the Patient Education summary report for full details.         OT Goals      Most Recent Value   Grooming  Goal 1   Activity/Assistive Device  grooming skills, all at 12/24/2019 1136   [place]  modified independence at 12/24/2019 1136   Time Frame  by discharge at 12/24/2019 1136   Progress/Outcome  goal ongoing at 12/24/2019 1136

## 2020-01-04 NOTE — PLAN OF CARE
Problem: Adult Inpatient Plan of Care  Goal: Plan of Care Review  Outcome: Progressing  Flowsheets (Taken 1/4/2020 1500)  Progress: improving  Plan of Care Reviewed With: patient  Outcome Summary: Pt is Ind with functional txfers and Mod Ind with ADL tasks.  Plan is for home when medically stable.  Will D/C Ot services at this time.

## 2020-01-04 NOTE — PLAN OF CARE
Problem: Adult Inpatient Plan of Care  Goal: Plan of Care Review  Outcome: Progressing  Flowsheets (Taken 1/4/2020 2551)  Progress: improving  Plan of Care Reviewed With: patient  Outcome Summary: Pt offers no complaints at this time.

## 2020-01-05 VITALS
WEIGHT: 166 LBS | HEIGHT: 68 IN | SYSTOLIC BLOOD PRESSURE: 146 MMHG | DIASTOLIC BLOOD PRESSURE: 76 MMHG | HEART RATE: 89 BPM | RESPIRATION RATE: 18 BRPM | BODY MASS INDEX: 25.16 KG/M2 | OXYGEN SATURATION: 94 % | TEMPERATURE: 97.9 F

## 2020-01-05 LAB
ANION GAP SERPL CALC-SCNC: 11 MEQ/L (ref 3–15)
BUN SERPL-MCNC: 33 MG/DL (ref 8–20)
CALCIUM SERPL-MCNC: 8 MG/DL (ref 8.9–10.3)
CHLORIDE SERPL-SCNC: 97 MEQ/L (ref 98–109)
CO2 SERPL-SCNC: 26 MEQ/L (ref 22–32)
CREAT SERPL-MCNC: 1.1 MG/DL
GFR SERPL CREATININE-BSD FRML MDRD: >60 ML/MIN/1.73M*2
GLUCOSE SERPL-MCNC: 182 MG/DL (ref 70–99)
POTASSIUM SERPL-SCNC: 4.9 MEQ/L (ref 3.6–5.1)
SODIUM SERPL-SCNC: 134 MEQ/L (ref 136–144)

## 2020-01-05 PROCEDURE — 36415 COLL VENOUS BLD VENIPUNCTURE: CPT | Performed by: INTERNAL MEDICINE

## 2020-01-05 PROCEDURE — 63700000 HC SELF-ADMINISTRABLE DRUG: Performed by: STUDENT IN AN ORGANIZED HEALTH CARE EDUCATION/TRAINING PROGRAM

## 2020-01-05 PROCEDURE — 80048 BASIC METABOLIC PNL TOTAL CA: CPT | Performed by: INTERNAL MEDICINE

## 2020-01-05 PROCEDURE — 63700000 HC SELF-ADMINISTRABLE DRUG: Performed by: INTERNAL MEDICINE

## 2020-01-05 PROCEDURE — 25000000 HC PHARMACY GENERAL: Performed by: INTERNAL MEDICINE

## 2020-01-05 PROCEDURE — 99238 HOSP IP/OBS DSCHRG MGMT 30/<: CPT | Performed by: INTERNAL MEDICINE

## 2020-01-05 RX ORDER — GUAIFENESIN 600 MG/1
600 TABLET, EXTENDED RELEASE ORAL 2 TIMES DAILY
Qty: 20 TABLET | Refills: 0 | Status: SHIPPED | OUTPATIENT
Start: 2020-01-05 | End: 2020-01-15

## 2020-01-05 RX ORDER — PREDNISONE 10 MG/1
TABLET ORAL
Qty: 30 TABLET | Refills: 0 | Status: ON HOLD | OUTPATIENT
Start: 2020-01-05 | End: 2020-01-13

## 2020-01-05 RX ADMIN — TAMSULOSIN HYDROCHLORIDE 0.4 MG: 0.4 CAPSULE ORAL at 08:48

## 2020-01-05 RX ADMIN — FINASTERIDE 5 MG: 5 TABLET, FILM COATED ORAL at 08:48

## 2020-01-05 RX ADMIN — ATORVASTATIN CALCIUM 10 MG: 10 TABLET, FILM COATED ORAL at 08:47

## 2020-01-05 RX ADMIN — FORMOTEROL FUMARATE DIHYDRATE 20 MCG: 20 SOLUTION RESPIRATORY (INHALATION) at 05:15

## 2020-01-05 RX ADMIN — GUAIFENESIN 1200 MG: 600 TABLET, EXTENDED RELEASE ORAL at 08:48

## 2020-01-05 RX ADMIN — PREDNISONE 40 MG: 20 TABLET ORAL at 08:48

## 2020-01-05 RX ADMIN — ESCITALOPRAM OXALATE 10 MG: 10 TABLET, FILM COATED ORAL at 08:48

## 2020-01-05 RX ADMIN — PANTOPRAZOLE SODIUM 20 MG: 20 TABLET, DELAYED RELEASE ORAL at 08:48

## 2020-01-05 RX ADMIN — AMLODIPINE BESYLATE 10 MG: 10 TABLET ORAL at 08:47

## 2020-01-05 RX ADMIN — FAMOTIDINE 20 MG: 20 TABLET ORAL at 08:47

## 2020-01-05 RX ADMIN — FLUTICASONE PROPIONATE 2 SPRAY: 50 SPRAY, METERED NASAL at 08:49

## 2020-01-05 RX ADMIN — BUDESONIDE 0.5 MG: 0.5 INHALANT ORAL at 05:10

## 2020-01-05 RX ADMIN — DILTIAZEM HYDROCHLORIDE 120 MG: 120 CAPSULE, COATED, EXTENDED RELEASE ORAL at 08:47

## 2020-01-05 RX ADMIN — IPRATROPIUM BROMIDE AND ALBUTEROL SULFATE 3 ML: .5; 3 SOLUTION RESPIRATORY (INHALATION) at 05:11

## 2020-01-05 RX ADMIN — CARVEDILOL 3.12 MG: 3.12 TABLET, FILM COATED ORAL at 08:48

## 2020-01-05 NOTE — DISCHARGE INSTRUCTIONS
COPD and Physical Activity  Chronic obstructive pulmonary disease (COPD) is a long-term (chronic) condition that affects the lungs. COPD is a general term that can be used to describe many different lung problems that cause lung swelling (inflammation) and limit airflow, including chronic bronchitis and emphysema.  The main symptom of COPD is shortness of breath, which makes it harder to do even simple tasks. This can also make it harder to exercise and be active. Talk with your health care provider about treatments to help you breathe better and actions you can take to prevent breathing problems during physical activity.  What are the benefits of exercising with COPD?  Exercising regularly is an important part of a healthy lifestyle. You can still exercise and do physical activities even though you have COPD. Exercise and physical activity improve your shortness of breath by increasing blood flow (circulation). This causes your heart to pump more oxygen through your body. Moderate exercise can improve your:  · Oxygen use.  · Energy level.  · Shortness of breath.  · Strength in your breathing muscles.  · Heart health.  · Sleep.  · Self-esteem and feelings of self-worth.  · Depression, stress, and anxiety levels.  Exercise can benefit everyone with COPD. The severity of your disease may affect how hard you can exercise, especially at first, but everyone can benefit. Talk with your health care provider about how much exercise is safe for you, and which activities and exercises are safe for you.  What actions can I take to prevent breathing problems during physical activity?  · Sign up for a pulmonary rehabilitation program. This type of program may include:  ? Education about lung diseases.  ? Exercise classes that teach you how to exercise and be more active while improving your breathing. This usually involves:  § Exercise using your lower extremities, such as a stationary bicycle.  § About 30 minutes of  exercise, 2 to 5 times per week, for 6 to 12 weeks  § Strength training, such as push ups or leg lifts.  ? Nutrition education.  ? Group classes in which you can talk with others who also have COPD and learn ways to manage stress.  · If you use an oxygen tank, you should use it while you exercise. Work with your health care provider to adjust your oxygen for your physical activity. Your resting flow rate is different from your flow rate during physical activity.  · While you are exercising:  ? Take slow breaths.  ? Pace yourself and do not try to go too fast.  ? Purse your lips while breathing out. Pursing your lips is similar to a kissing or whistling position.  ? If doing exercise that uses a quick burst of effort, such as weight lifting:  § Breathe in before starting the exercise.  § Breathe out during the hardest part of the exercise (such as raising the weights).  Where to find support  You can find support for exercising with COPD from:  · Your health care provider.  · A pulmonary rehabilitation program.  · Your local health department or community health programs.  · Support groups, online or in-person. Your health care provider may be able to recommend support groups.  Where to find more information  You can find more information about exercising with COPD from:  · American Lung Association: lung.org.  · COPD Foundation: copdfoundation.org.  Contact a health care provider if:  · Your symptoms get worse.  · You have chest pain.  · You have nausea.  · You have a fever.  · You have trouble talking or catching your breath.  · You want to start a new exercise program or a new activity.  Summary  · COPD is a general term that can be used to describe many different lung problems that cause lung swelling (inflammation) and limit airflow. This includes chronic bronchitis and emphysema.  · Exercise and physical activity improve your shortness of breath by increasing blood flow (circulation). This causes your heart to  provide more oxygen to your body.  · Contact your health care provider before starting any exercise program or new activity. Ask your health care provider what exercises and activities are safe for you.  This information is not intended to replace advice given to you by your health care provider. Make sure you discuss any questions you have with your health care provider.  Document Released: 01/10/2019 Document Revised: 01/10/2019 Document Reviewed: 01/10/2019  [employer] [place] Interactive Patient Education © 2019 [employer]  Dear Mr. Luong,    He came to [facility] with shortness of breath and cough even after completing a course of azithromycin at home.  We were concerned for COPD exacerbation so we admitted you to the hospital for intravenous steroids.  You received intravenous steroids over the course of several days with subsequent improvement in your shortness of breath.  We had Dr. Montano and her team see you while you are in the hospital who recommended we restart you on azithromycin and continue with all of your home medications including Perforomist.  We did a sputum culture which was negative for infection.  Now that your symptoms have improved, we feel safe discharging you home.  We have sent a prednisone taper to your pharmacy.  Please take this medication as prescribed.  Please make sure to follow-up with their primary care doctor in the next 2 to 4 weeks along with follow-up with Dr. Montano as regularly scheduled.    It was a pleasure to take part in your care.  Happy New Year!

## 2020-01-05 NOTE — NURSING NOTE
Patient discharged home. Discharge instructions reviewed. All questions answered. Christopher d/preeti. Tele d/preeti. Escorted from unit by staff and driven home by wife.

## 2020-01-05 NOTE — PLAN OF CARE
Problem: Adult Inpatient Plan of Care  Goal: Plan of Care Review  Outcome: Progressing  Flowsheets (Taken 1/5/2020 0242)  Progress: improving  Plan of Care Reviewed With: patient  Outcome Summary: Pt O2 will be above 90% during this shift.

## 2020-01-06 NOTE — PROGRESS NOTES
120 Day Assessment  Ed continues to miss his Pulmonary rehab sessions due to frequent illnesses.  Attended 1 session in the last 28 days. Recent hospitalization has kept him from attending class combined with holiday schedule. I will call him this week to discuss some options.

## 2020-01-07 ENCOUNTER — APPOINTMENT (EMERGENCY)
Dept: RADIOLOGY | Facility: HOSPITAL | Age: 85
DRG: 190 | End: 2020-01-07
Attending: EMERGENCY MEDICINE
Payer: COMMERCIAL

## 2020-01-07 ENCOUNTER — HOSPITAL ENCOUNTER (INPATIENT)
Facility: HOSPITAL | Age: 85
LOS: 6 days | Discharge: HOME HEALTH | DRG: 190 | End: 2020-01-13
Attending: EMERGENCY MEDICINE | Admitting: INTERNAL MEDICINE
Payer: COMMERCIAL

## 2020-01-07 DIAGNOSIS — R06.02 SHORTNESS OF BREATH: Primary | ICD-10-CM

## 2020-01-07 DIAGNOSIS — R60.0 BILATERAL LEG EDEMA: ICD-10-CM

## 2020-01-07 DIAGNOSIS — J44.1 COPD EXACERBATION (CMS/HCC): ICD-10-CM

## 2020-01-07 DIAGNOSIS — R41.0 ACUTE DELIRIUM: ICD-10-CM

## 2020-01-07 PROBLEM — J96.21 ACUTE ON CHRONIC RESPIRATORY FAILURE WITH HYPOXIA (CMS/HCC): Status: ACTIVE | Noted: 2020-01-07

## 2020-01-07 PROBLEM — E11.9 DIABETES MELLITUS (CMS/HCC): Status: ACTIVE | Noted: 2020-01-07

## 2020-01-07 PROBLEM — G93.40 ENCEPHALOPATHY: Status: ACTIVE | Noted: 2020-01-07

## 2020-01-07 PROBLEM — J96.22 ACUTE ON CHRONIC RESPIRATORY FAILURE WITH HYPOXIA AND HYPERCAPNIA (CMS/HCC): Status: ACTIVE | Noted: 2020-01-07

## 2020-01-07 LAB
ALBUMIN SERPL-MCNC: 2.7 G/DL (ref 3.4–5)
ALP SERPL-CCNC: 66 IU/L (ref 35–126)
ALT SERPL-CCNC: 34 IU/L (ref 16–63)
ANION GAP SERPL CALC-SCNC: 8 MEQ/L (ref 3–15)
APTT PPP: 23 SEC (ref 23–35)
AST SERPL-CCNC: 18 IU/L (ref 15–41)
ATRIAL RATE: 84
BASE EXCESS BLDA CALC-SCNC: 5.4 MEQ/L
BASOPHILS # BLD: 0.16 K/UL (ref 0.01–0.1)
BASOPHILS NFR BLD: 0.8 %
BILIRUB SERPL-MCNC: 0.6 MG/DL (ref 0.3–1.2)
BILIRUB UR QL STRIP.AUTO: NEGATIVE MG/DL
BNP SERPL-MCNC: 103 PG/ML
BUN SERPL-MCNC: 28 MG/DL (ref 8–20)
CA-I BLD-SCNC: 1.18 MMOL/L (ref 1.15–1.27)
CALCIUM SERPL-MCNC: 8.4 MG/DL (ref 8.9–10.3)
CHLORIDE SERPL-SCNC: 98 MEQ/L (ref 98–109)
CLARITY UR REFRACT.AUTO: CLEAR
CO2 BLDA-SCNC: 37.3 MEQ/L (ref 22–32)
CO2 SERPL-SCNC: 28 MEQ/L (ref 22–32)
COHGB MFR BLD: 1.4 % (ref 0–1.5)
COLOR UR AUTO: YELLOW
CREAT SERPL-MCNC: 1.2 MG/DL
DIFFERENTIAL METHOD BLD: ABNORMAL
EOSINOPHIL # BLD: 0.01 K/UL (ref 0.04–0.54)
EOSINOPHIL NFR BLD: 0.1 %
ERYTHROCYTE [DISTWIDTH] IN BLOOD BY AUTOMATED COUNT: 13.2 % (ref 11.6–14.4)
EST. AVERAGE GLUCOSE BLD GHB EST-MCNC: 177 MG/DL
FIO2 ON VENT: ABNORMAL %
FLUAV RNA SPEC QL NAA+PROBE: NEGATIVE
FLUBV RNA SPEC QL NAA+PROBE: NEGATIVE
GFR SERPL CREATININE-BSD FRML MDRD: 58 ML/MIN/1.73M*2
GLUCOSE BLD-MCNC: 298 MG/DL (ref 70–99)
GLUCOSE BLDA-MCNC: 178 MG/DL (ref 65–95)
GLUCOSE SERPL-MCNC: 266 MG/DL (ref 70–99)
GLUCOSE UR STRIP.AUTO-MCNC: ABNORMAL MG/DL
HADV DNA SPEC QL NAA+PROBE: NEGATIVE
HBA1C MFR BLD HPLC: 7.8 %
HCO3 BLDA-SCNC: 35.1 MEQ/L (ref 21–28)
HCOV 229E RNA SPEC QL NAA+PROBE: NEGATIVE
HCOV HKU1 RNA SPEC QL NAA+PROBE: NEGATIVE
HCOV NL63 RNA SPEC QL NAA+PROBE: NEGATIVE
HCOV OC43 RNA SPEC QL NAA+PROBE: NEGATIVE
HCT VFR BLDA CALC: 51 % (ref 42–52)
HCT VFR BLDCO AUTO: 45.8 % (ref 40.1–51)
HGB BLD-MCNC: 15.5 G/DL
HGB BLDA OXIMETRY-MCNC: 16.1 G/DL (ref 14–17.5)
HGB UR QL STRIP.AUTO: NEGATIVE
HMPV RNA SPEC QL NAA+PROBE: NEGATIVE
HPIV1 RNA SPEC QL NAA+PROBE: NEGATIVE
HPIV2 RNA SPEC QL NAA+PROBE: NEGATIVE
HPIV3 RNA SPEC QL NAA+PROBE: NEGATIVE
HPIV4 RNA SPEC QL NAA+PROBE: NEGATIVE
IMM GRANULOCYTES # BLD AUTO: 0.93 K/UL (ref 0–0.08)
IMM GRANULOCYTES NFR BLD AUTO: 4.7 %
INHALED O2 CONCENTRATION: ABNORMAL %
INR PPP: 0.9 INR
KETONES UR STRIP.AUTO-MCNC: NEGATIVE MG/DL
LACTATE BLDA-SCNC: 0.9 MMOL/L (ref 0.4–1.6)
LEUKOCYTE ESTERASE UR QL STRIP.AUTO: NEGATIVE
LYMPHOCYTES # BLD: 0.42 K/UL (ref 1.2–3.5)
LYMPHOCYTES NFR BLD: 2.1 %
MCH RBC QN AUTO: 32.2 PG (ref 28–33.2)
MCHC RBC AUTO-ENTMCNC: 33.8 G/DL (ref 32.2–36.5)
MCV RBC AUTO: 95 FL (ref 83–98)
METHGB BLD-SCNC: 1.2 % (ref 0.4–1.5)
MONOCYTES # BLD: 0.55 K/UL (ref 0.3–1)
MONOCYTES NFR BLD: 2.8 %
NEUTROPHILS # BLD: 17.74 K/UL (ref 1.7–7)
NEUTS SEG NFR BLD: 89.5 %
NITRITE UR QL STRIP.AUTO: NEGATIVE
NRBC BLD-RTO: 0 %
P AXIS: 21
PCO2 BLDA: 73 MM HG (ref 35–48)
PDW BLD AUTO: 9.8 FL (ref 9.4–12.4)
PH BLDA: 7.29 [PH] (ref 7.35–7.45)
PH UR STRIP.AUTO: 6.5 [PH]
PLATELET # BLD AUTO: 210 K/UL
PO2 BLDA: 28 MM HG (ref 83–100)
POCT TEST: ABNORMAL
POTASSIUM BLDA-SCNC: 5.4 MEQ/L (ref 3.4–4.5)
POTASSIUM SERPL-SCNC: 5.2 MEQ/L (ref 3.6–5.1)
PR INTERVAL: 154
PROT SERPL-MCNC: 5.3 G/DL (ref 6–8.2)
PROT UR QL STRIP.AUTO: NEGATIVE
PROTHROMBIN TIME: 12.2 SEC (ref 12.2–14.5)
QRS DURATION: 60
QT INTERVAL: 360
QTC CALCULATION(BAZETT): 425
R AXIS: -30
RBC # BLD AUTO: 4.82 M/UL (ref 4.5–5.8)
RSV RNA SPEC QL NAA+PROBE: NEGATIVE
RV+EV RNA SPEC QL NAA+PROBE: NEGATIVE
SAO2 % BLDA: 52 % (ref 93–98)
SODIUM BLDA-SCNC: 134 MEQ/L (ref 136–145)
SODIUM SERPL-SCNC: 134 MEQ/L (ref 136–144)
SP GR UR REFRACT.AUTO: 1.01
T WAVE AXIS: 60
TEST PERFORMANCE INFO SPEC: NORMAL
TROPONIN I SERPL-MCNC: <0.03 NG/ML
UROBILINOGEN UR STRIP-ACNC: 0.2 EU/DL
VENTRICULAR RATE: 84
WBC # BLD AUTO: 19.81 K/UL

## 2020-01-07 PROCEDURE — 84484 ASSAY OF TROPONIN QUANT: CPT

## 2020-01-07 PROCEDURE — 25000000 HC PHARMACY GENERAL: Performed by: STUDENT IN AN ORGANIZED HEALTH CARE EDUCATION/TRAINING PROGRAM

## 2020-01-07 PROCEDURE — 87040 BLOOD CULTURE FOR BACTERIA: CPT | Performed by: STUDENT IN AN ORGANIZED HEALTH CARE EDUCATION/TRAINING PROGRAM

## 2020-01-07 PROCEDURE — 85730 THROMBOPLASTIN TIME PARTIAL: CPT

## 2020-01-07 PROCEDURE — 25000000 HC PHARMACY GENERAL: Performed by: EMERGENCY MEDICINE

## 2020-01-07 PROCEDURE — 80053 COMPREHEN METABOLIC PANEL: CPT | Performed by: EMERGENCY MEDICINE

## 2020-01-07 PROCEDURE — 85025 COMPLETE CBC W/AUTO DIFF WBC: CPT | Performed by: EMERGENCY MEDICINE

## 2020-01-07 PROCEDURE — 63600000 HC DRUGS/DETAIL CODE: Performed by: STUDENT IN AN ORGANIZED HEALTH CARE EDUCATION/TRAINING PROGRAM

## 2020-01-07 PROCEDURE — 80053 COMPREHEN METABOLIC PANEL: CPT

## 2020-01-07 PROCEDURE — 87641 MR-STAPH DNA AMP PROBE: CPT | Performed by: STUDENT IN AN ORGANIZED HEALTH CARE EDUCATION/TRAINING PROGRAM

## 2020-01-07 PROCEDURE — 81003 URINALYSIS AUTO W/O SCOPE: CPT | Performed by: EMERGENCY MEDICINE

## 2020-01-07 PROCEDURE — 93005 ELECTROCARDIOGRAM TRACING: CPT | Performed by: EMERGENCY MEDICINE

## 2020-01-07 PROCEDURE — 83036 HEMOGLOBIN GLYCOSYLATED A1C: CPT | Performed by: STUDENT IN AN ORGANIZED HEALTH CARE EDUCATION/TRAINING PROGRAM

## 2020-01-07 PROCEDURE — 63700000 HC SELF-ADMINISTRABLE DRUG: Performed by: STUDENT IN AN ORGANIZED HEALTH CARE EDUCATION/TRAINING PROGRAM

## 2020-01-07 PROCEDURE — 93970 EXTREMITY STUDY: CPT

## 2020-01-07 PROCEDURE — 21400000 HC ROOM AND CARE CCU/INTERMEDIATE

## 2020-01-07 PROCEDURE — 82375 ASSAY CARBOXYHB QUANT: CPT | Performed by: STUDENT IN AN ORGANIZED HEALTH CARE EDUCATION/TRAINING PROGRAM

## 2020-01-07 PROCEDURE — 85610 PROTHROMBIN TIME: CPT

## 2020-01-07 PROCEDURE — 84484 ASSAY OF TROPONIN QUANT: CPT | Performed by: EMERGENCY MEDICINE

## 2020-01-07 PROCEDURE — 71046 X-RAY EXAM CHEST 2 VIEWS: CPT

## 2020-01-07 PROCEDURE — 85730 THROMBOPLASTIN TIME PARTIAL: CPT | Performed by: EMERGENCY MEDICINE

## 2020-01-07 PROCEDURE — 36415 COLL VENOUS BLD VENIPUNCTURE: CPT

## 2020-01-07 PROCEDURE — 99285 EMERGENCY DEPT VISIT HI MDM: CPT | Mod: 25

## 2020-01-07 PROCEDURE — 93010 ELECTROCARDIOGRAM REPORT: CPT | Performed by: INTERNAL MEDICINE

## 2020-01-07 PROCEDURE — 85610 PROTHROMBIN TIME: CPT | Performed by: EMERGENCY MEDICINE

## 2020-01-07 PROCEDURE — 93005 ELECTROCARDIOGRAM TRACING: CPT

## 2020-01-07 PROCEDURE — 99222 1ST HOSP IP/OBS MODERATE 55: CPT | Performed by: INTERNAL MEDICINE

## 2020-01-07 PROCEDURE — 85025 COMPLETE CBC W/AUTO DIFF WBC: CPT

## 2020-01-07 PROCEDURE — 83880 ASSAY OF NATRIURETIC PEPTIDE: CPT | Performed by: STUDENT IN AN ORGANIZED HEALTH CARE EDUCATION/TRAINING PROGRAM

## 2020-01-07 PROCEDURE — 87633 RESP VIRUS 12-25 TARGETS: CPT | Performed by: STUDENT IN AN ORGANIZED HEALTH CARE EDUCATION/TRAINING PROGRAM

## 2020-01-07 RX ORDER — FAMOTIDINE 20 MG/1
40 TABLET, FILM COATED ORAL DAILY
Status: DISCONTINUED | OUTPATIENT
Start: 2020-01-08 | End: 2020-01-13 | Stop reason: HOSPADM

## 2020-01-07 RX ORDER — NITROGLYCERIN 0.4 MG/1
0.4 TABLET SUBLINGUAL EVERY 5 MIN PRN
Status: DISCONTINUED | OUTPATIENT
Start: 2020-01-07 | End: 2020-01-13 | Stop reason: HOSPADM

## 2020-01-07 RX ORDER — IPRATROPIUM BROMIDE AND ALBUTEROL SULFATE 2.5; .5 MG/3ML; MG/3ML
3 SOLUTION RESPIRATORY (INHALATION) ONCE
Status: COMPLETED | OUTPATIENT
Start: 2020-01-07 | End: 2020-01-07

## 2020-01-07 RX ORDER — DEXTROSE 50 % IN WATER (D50W) INTRAVENOUS SYRINGE
25 AS NEEDED
Status: DISCONTINUED | OUTPATIENT
Start: 2020-01-07 | End: 2020-01-13 | Stop reason: HOSPADM

## 2020-01-07 RX ORDER — PANTOPRAZOLE SODIUM 20 MG/1
20 TABLET, DELAYED RELEASE ORAL DAILY
Status: DISCONTINUED | OUTPATIENT
Start: 2020-01-08 | End: 2020-01-13 | Stop reason: HOSPADM

## 2020-01-07 RX ORDER — CHLORHEXIDINE GLUCONATE ORAL RINSE 1.2 MG/ML
15 SOLUTION DENTAL 2 TIMES DAILY
COMMUNITY

## 2020-01-07 RX ORDER — TAMSULOSIN HYDROCHLORIDE 0.4 MG/1
0.4 CAPSULE ORAL NIGHTLY
Status: DISCONTINUED | OUTPATIENT
Start: 2020-01-07 | End: 2020-01-13 | Stop reason: HOSPADM

## 2020-01-07 RX ORDER — DILTIAZEM HYDROCHLORIDE 120 MG/1
120 CAPSULE, COATED, EXTENDED RELEASE ORAL DAILY
Status: DISCONTINUED | OUTPATIENT
Start: 2020-01-08 | End: 2020-01-13 | Stop reason: HOSPADM

## 2020-01-07 RX ORDER — BUDESONIDE 0.5 MG/2ML
0.5 INHALANT ORAL
Status: DISCONTINUED | OUTPATIENT
Start: 2020-01-07 | End: 2020-01-13 | Stop reason: HOSPADM

## 2020-01-07 RX ORDER — ATROPINE SULFATE 0.1 MG/ML
0.5 INJECTION INTRAVENOUS EVERY 5 MIN PRN
Status: DISCONTINUED | OUTPATIENT
Start: 2020-01-07 | End: 2020-01-13 | Stop reason: HOSPADM

## 2020-01-07 RX ORDER — CETIRIZINE HYDROCHLORIDE 10 MG/1
10 TABLET ORAL NIGHTLY
Status: DISCONTINUED | OUTPATIENT
Start: 2020-01-07 | End: 2020-01-13 | Stop reason: HOSPADM

## 2020-01-07 RX ORDER — DEXTROSE 40 %
15-30 GEL (GRAM) ORAL AS NEEDED
Status: DISCONTINUED | OUTPATIENT
Start: 2020-01-07 | End: 2020-01-13 | Stop reason: HOSPADM

## 2020-01-07 RX ORDER — ENOXAPARIN SODIUM 100 MG/ML
40 INJECTION SUBCUTANEOUS
Status: DISCONTINUED | OUTPATIENT
Start: 2020-01-08 | End: 2020-01-13 | Stop reason: HOSPADM

## 2020-01-07 RX ORDER — AMLODIPINE BESYLATE 5 MG/1
5 TABLET ORAL DAILY
Status: DISCONTINUED | OUTPATIENT
Start: 2020-01-08 | End: 2020-01-13 | Stop reason: HOSPADM

## 2020-01-07 RX ORDER — INSULIN ASPART 100 [IU]/ML
3-5 INJECTION, SOLUTION INTRAVENOUS; SUBCUTANEOUS
Status: DISCONTINUED | OUTPATIENT
Start: 2020-01-08 | End: 2020-01-09 | Stop reason: SDUPTHER

## 2020-01-07 RX ORDER — IPRATROPIUM BROMIDE AND ALBUTEROL SULFATE 2.5; .5 MG/3ML; MG/3ML
3 SOLUTION RESPIRATORY (INHALATION)
Status: DISCONTINUED | OUTPATIENT
Start: 2020-01-07 | End: 2020-01-13 | Stop reason: HOSPADM

## 2020-01-07 RX ORDER — METRONIDAZOLE 500 MG/1
500 TABLET ORAL 2 TIMES DAILY
Status: DISCONTINUED | OUTPATIENT
Start: 2020-01-07 | End: 2020-01-08

## 2020-01-07 RX ORDER — GUAIFENESIN 600 MG/1
600 TABLET, EXTENDED RELEASE ORAL 2 TIMES DAILY
Status: DISCONTINUED | OUTPATIENT
Start: 2020-01-07 | End: 2020-01-13 | Stop reason: HOSPADM

## 2020-01-07 RX ORDER — CARVEDILOL 3.12 MG/1
3.12 TABLET ORAL 2 TIMES DAILY WITH MEALS
Status: DISCONTINUED | OUTPATIENT
Start: 2020-01-08 | End: 2020-01-13 | Stop reason: HOSPADM

## 2020-01-07 RX ORDER — FINASTERIDE 5 MG/1
5 TABLET, FILM COATED ORAL DAILY
Status: DISCONTINUED | OUTPATIENT
Start: 2020-01-08 | End: 2020-01-13 | Stop reason: HOSPADM

## 2020-01-07 RX ORDER — ESCITALOPRAM OXALATE 10 MG/1
10 TABLET ORAL DAILY
Status: DISCONTINUED | OUTPATIENT
Start: 2020-01-08 | End: 2020-01-13 | Stop reason: HOSPADM

## 2020-01-07 RX ORDER — ATORVASTATIN CALCIUM 10 MG/1
10 TABLET, FILM COATED ORAL DAILY
Status: DISCONTINUED | OUTPATIENT
Start: 2020-01-08 | End: 2020-01-13 | Stop reason: HOSPADM

## 2020-01-07 RX ORDER — BUDESONIDE AND FORMOTEROL FUMARATE DIHYDRATE 160; 4.5 UG/1; UG/1
2 AEROSOL RESPIRATORY (INHALATION) 2 TIMES DAILY PRN
COMMUNITY

## 2020-01-07 RX ORDER — DOXYCYCLINE HYCLATE 100 MG
100 TABLET ORAL EVERY 12 HOURS
Status: DISCONTINUED | OUTPATIENT
Start: 2020-01-07 | End: 2020-01-13 | Stop reason: HOSPADM

## 2020-01-07 RX ORDER — MONTELUKAST SODIUM 10 MG/1
10 TABLET ORAL NIGHTLY
Status: DISCONTINUED | OUTPATIENT
Start: 2020-01-07 | End: 2020-01-13 | Stop reason: HOSPADM

## 2020-01-07 RX ORDER — FUROSEMIDE 10 MG/ML
20 INJECTION INTRAMUSCULAR; INTRAVENOUS ONCE
Status: COMPLETED | OUTPATIENT
Start: 2020-01-07 | End: 2020-01-07

## 2020-01-07 RX ORDER — IBUPROFEN 200 MG
16-32 TABLET ORAL AS NEEDED
Status: DISCONTINUED | OUTPATIENT
Start: 2020-01-07 | End: 2020-01-13 | Stop reason: HOSPADM

## 2020-01-07 RX ADMIN — METRONIDAZOLE 500 MG: 500 TABLET, FILM COATED ORAL at 22:30

## 2020-01-07 RX ADMIN — CETIRIZINE HYDROCHLORIDE 10 MG: 10 TABLET, FILM COATED ORAL at 22:31

## 2020-01-07 RX ADMIN — METHYLPREDNISOLONE SODIUM SUCCINATE 40 MG: 40 INJECTION, POWDER, FOR SOLUTION INTRAMUSCULAR; INTRAVENOUS at 18:15

## 2020-01-07 RX ADMIN — BUDESONIDE 0.5 MG: 0.5 INHALANT ORAL at 18:15

## 2020-01-07 RX ADMIN — DOXYCYCLINE HYCLATE 100 MG: 100 TABLET, FILM COATED ORAL at 22:30

## 2020-01-07 RX ADMIN — MONTELUKAST 10 MG: 10 TABLET, FILM COATED ORAL at 22:31

## 2020-01-07 RX ADMIN — IPRATROPIUM BROMIDE AND ALBUTEROL SULFATE 3 ML: .5; 3 SOLUTION RESPIRATORY (INHALATION) at 17:13

## 2020-01-07 RX ADMIN — IPRATROPIUM BROMIDE AND ALBUTEROL SULFATE 3 ML: 2.5; .5 SOLUTION RESPIRATORY (INHALATION) at 22:31

## 2020-01-07 RX ADMIN — FUROSEMIDE 20 MG: 10 INJECTION, SOLUTION INTRAMUSCULAR; INTRAVENOUS at 21:38

## 2020-01-07 RX ADMIN — GUAIFENESIN 600 MG: 600 TABLET ORAL at 22:30

## 2020-01-07 RX ADMIN — TAMSULOSIN HYDROCHLORIDE 0.4 MG: 0.4 CAPSULE ORAL at 22:31

## 2020-01-07 ASSESSMENT — ENCOUNTER SYMPTOMS
COUGH: 1
LIGHT-HEADEDNESS: 0
DIAPHORESIS: 0
SPEECH DIFFICULTY: 0
BACK PAIN: 0
CONFUSION: 1
FEVER: 0
ACTIVITY CHANGE: 1
FATIGUE: 1
SHORTNESS OF BREATH: 1
NERVOUS/ANXIOUS: 0
CONFUSION: 0
ABDOMINAL PAIN: 0
FLANK PAIN: 0
PHOTOPHOBIA: 0
DIFFICULTY URINATING: 0
NAUSEA: 0
PALPITATIONS: 0
WHEEZING: 1
VOMITING: 0
DECREASED CONCENTRATION: 1
DYSURIA: 0
DIARRHEA: 0
TROUBLE SWALLOWING: 0
CHEST TIGHTNESS: 1
SORE THROAT: 0
CHILLS: 0
POLYDIPSIA: 0

## 2020-01-07 ASSESSMENT — COGNITIVE AND FUNCTIONAL STATUS - GENERAL
HELP NEEDED FOR BATHING: 3 - A LITTLE
HELP NEEDED FOR PERSONAL GROOMING: 4 - NONE
TOILETING: 4 - NONE
CLIMB 3 TO 5 STEPS WITH RAILING: 3 - A LITTLE
MOVING TO AND FROM BED TO CHAIR: 3 - A LITTLE
DRESSING REGULAR UPPER BODY CLOTHING: 4 - NONE
WALKING IN HOSPITAL ROOM: 3 - A LITTLE
STANDING UP FROM CHAIR USING ARMS: 3 - A LITTLE
EATING MEALS: 4 - NONE
DRESSING REGULAR LOWER BODY CLOTHING: 3 - A LITTLE

## 2020-01-07 NOTE — CONSULTS
Pulmonary Consult Note     Indication for Consultation:  COPD    Requesting Physician:  Dr. Donovan    Primary Care Physician:  Bella Walters MD    Pulmonologist:  Dr. Montano      HISTORY OF PRESENT ILLNESS        This is an 84 year old male with PMHx of COPD/emphysema with frequent exacerbations, HTN who presented with shortness of breath and AMS.     He was recently [place] to [facility] [place] COPD exacerbation and tracheobronchitis and was treated with IV steroids, bronchodilators, antibiotics (Azithromycin followed by levofloxacin). He was very slow to improve. He was was ultimately discharged on 1/5/2020 on a prednisone taper. He was on 40mg oral daily until today when he decreased to 30mg. He did well for the first 24 hours at home. His wife and daughter then notice intermittent periods of confusion. Additionally, he was talking in his sleep which is abnormal. He had increased shortness of breath, productive cough with yellow sputum, and bilateral lower extremity edema left greater than right. He denies fevers, chills, rigors, URI Symptoms, chest pain/tightness/pressure, back pain, abdominal pain, nausea, vomiting, aspiration events.      PAST MEDICAL AND SURGICAL HISTORY        Past Medical History:   Diagnosis Date   • COPD (chronic obstructive pulmonary disease) (CMS/[facility])    • Diverticulitis of colon     1987   • Emphysema lung (CMS/[facility])    • Hypertension    • Prostate cancer (CMS/[facility])     2007 s/p RT withouut recurrence     Past Surgical History:   Procedure Laterality Date   • COLON SURGERY     • MULTIPLE TOOTH EXTRACTIONS  Sept.-Nov 2018   • NASAL SEPTUM SURGERY     • SINUS SURGERY  11/2012 11/2012 by Dr. Mora        MEDICATIONS        Home Medications:  Not in a hospital admission.    Current Medications:  • ipratropium-albuterol  3 mL nebulization Once         ALLERGIES        Vancomycin and Amoxicillin-pot clavulanate    FAMILY HISTORY        Family History   Problem Relation Age of Onset   • Melanoma  Biological Mother    • Melanoma Biological Sister        SOCIAL HISTORY        Social History     Socioeconomic History   • Marital status:      Spouse name: Not on file   • Number of children: Not on file   • Years of education: Not on file   • Highest education level: Not on file   Occupational History   • Not on file   Social Needs   • Financial resource strain: Not on file   • Food insecurity:     Worry: Not on file     Inability: Not on file   • Transportation needs:     Medical: Not on file     Non-medical: Not on file   Tobacco Use   • Smoking status: Former Smoker     Packs/day: 2.00     Years: 40.00     Pack years: 80.00     Types: Cigarettes     Start date: 1954     Last attempt to quit: 1993     Years since quittin.4   • Smokeless tobacco: Never Used   Substance and Sexual Activity   • Alcohol use: No     Frequency: Never     Binge frequency: Never     Comment: stopped in    • Drug use: No   • Sexual activity: Defer   Lifestyle   • Physical activity:     Days per week: Not on file     Minutes per session: Not on file   • Stress: Not on file   Relationships   • Social connections:     Talks on phone: Not on file     Gets together: Not on file     Attends Nondenominational service: Not on file     Active member of club or organization: Not on file     Attends meetings of clubs or organizations: Not on file     Relationship status: Not on file   • Intimate partner violence:     Fear of current or ex partner: Not on file     Emotionally abused: Not on file     Physically abused: Not on file     Forced sexual activity: Not on file   Other Topics Concern   • Not on file   Social History Narrative   • Not on file       REVIEW OF SYSTEMS        A Full 10 point review of systems was obtained and is negative then otherwise stated in the HPI    PHYSICAL EXAMINATION      Vital Signs:   Visit Vitals  BP (!) 150/77 (BP Location: Right upper arm, Patient Position: Lying)   Pulse 77   Temp 36.6 °C (97.9  °F) (Oral)   Resp 20   SpO2 97%       I/O's:  No intake or output data in the 24 hours ending 01/07/20 1654    General: The patient is in no acute distress.  Resting comfortably in bed. He is alert and oriented x3 but altered and confused at times.   HEENT: Mucous membranes are moist.  Sclera are anicteric.  Neck: Supple.  No cervical lymphadenopathy  Cardiovascular: S1 and S2 are present.  There are no murmurs.  Lungs: Distant breath sounds bilaterally, rales at bases bilaterally, wheezes more superiorly.   Abdomen: Soft, nontender and nondistended  Extremities: Cool and dry with bilateral L>R pitting edema   Neuro: Awake and alert.  Spontaneously moving all extremities    Diagnostic Data      Labs:    I have personally reviewed all pertinent patient laboratory results. Labs of note discussed below:    ABG Results       12/23/19                          1154           Source Of Oxygen bipap                         BMP Results       01/07/20 01/05/20 01/04/20                    1442 0650 0718          134 136         K 5.2 4.9 4.9         Cl 98 97 98         CO2 28 26 26         Glucose 266 182 194         BUN 28 33 33         Creatinine 1.2 1.1 1.2         Calcium 8.4 8.0 8.0         Anion Gap 8 11 12         EGFR 58.0 >60.0 58.0         Comment for K at 1442 on 01/07/20:    Results obtained on plasma. Plasma Potassium values may be up to 0.4 mEQ/L less than serum values. The differences may be greater for patients with high platelet or white cell counts.        CBC Results       01/07/20 01/02/20 01/01/20                    1442 0623 0702         WBC 19.81 18.05 17.34         RBC 4.82 4.69 4.61         HGB 15.5 14.7 14.7         HCT 45.8 43.8 42.9         MCV 95.0 93.4 93.1         MCH 32.2 31.3 31.9         MCHC 33.8 33.6 34.3          253 252                         Micro:   Microbiology Results     Procedure Component Value Units Date/Time    Sputum culture / smear Expectorated Sputum [[id number]]  Collected:  12/29/19 1701    Specimen:  Expectorated Sputum Updated:  12/29/19 2023    Narrative:       The following orders were created for panel order Sputum culture / smear Expectorated Sputum.  Procedure                               Abnormality         Status                     ---------                               -----------         ------                     Sputum Gram Stain (Lab O...[[id number]]                      Final result                 Please view results for these tests on the individual orders.    Sputum Gram Stain (Lab Only) Expectorated Sputum [[id number]] Collected:  12/29/19 1701    Specimen:  Expectorated Sputum Updated:  12/29/19 2023     Gram Stain Result No WBC Seen      4+ Epithelial cells      4+ Gram positive cocci in pairs and chains      2+ Gram positive bacilli      Oropharyngeal Contamination    Respiratory virus panel, PCR (Only to be ordered by ID or Critical Care physicians) Nasopharynx [[id number]] Collected:  12/27/19 0916    Specimen:  Nasopharyngeal Swab from Nasopharynx Updated:  12/27/19 1100     Adenovirus Negative     Coronavirus 229E Negative     Coronavirus HKU1 Negative     Coronavirus NL63 Negative     Coronavirus OC43 Negative     Human Metapneumovirus Negative     Rhinovirus/Enterovirus Negative     Influenza A Negative     Influenza B Negative     Parainfluenza Virus 1 Negative     Parainfluenza Virus 2 Negative     Parainfluenza Virus 3 Negative     Parainfluenza Virus 4 Negative     Respiratory Syncytial Virus Negative     Viral Respiratory Comment --    Sputum culture / smear Expectorated Sputum [[id number]] Collected:  12/25/19 1049    Specimen:  Expectorated Sputum Updated:  12/27/19 0815    Narrative:       The following orders were created for panel order Sputum culture / smear Expectorated Sputum.  Procedure                               Abnormality         Status                     ---------                               -----------         ------                      Sputum Gram Stain (Lab O...[[id number]]                      Final result               Sputum Culture (Lab Only...[[id number]]  Normal              Final result                 Please view results for these tests on the individual orders.    Sputum Gram Stain (Lab Only) Expectorated Sputum [[id number]] Collected:  12/25/19 1049    Specimen:  Expectorated Sputum Updated:  12/25/19 1137     Gram Stain Result 2+ WBC      1+ Epithelial cells      4+ Gram positive cocci in pairs      2+ Gram positive bacilli      2+ Gram negative bacilli    Sputum Culture (Lab Only) Expectorated Sputum [[id number]]  (Normal) Collected:  12/25/19 1049    Specimen:  Expectorated Sputum Updated:  12/27/19 0815     Culture Normal Ruchi    RSV and Influenza Nucleic Acids, PCR Nasopharyngeal Swab [[id number]]  (Normal) Collected:  12/23/19 1159    Specimen:  Nasopharyngeal Swab Updated:  12/23/19 1253     Influenza A Negative     Influenza B Negative     Respiratory Syncytial Virus Negative            Imaging:    I have reviewed all pertinent imaging which is discussed below:  Chest X-ray: Hyperinflated lungs. No effusions, infiltrates or definitive edema.           ASSESSMENT AND RECOMMENDATIONS         This is an 84 year old male with PMHx of COPD/emphysema with frequent exacerations, HTN who presented with shortness of breath and delirium.     1. COPD exacerbation- Recently admitted for COPD exacerbation that was very slow to recover. He was discharged 2 days ago on 2LNC on a prednisone taper and was doing well. The night prior to admission, he was noted to have increased shortness of breath and productive cough with yellow sputum. He had started his taper and was on 30mg of prednisone daily at this point. His chest x-ray is clear without infiltrates, edema, or effusions. He has wheezing primarily in his upper lobes with rales in bilateral lower lobes.     2. Lower extremity edema - New onset bilateral L>R lower extremity edema  which may be related to steroids, but cannot rule out new onset CHF. His last TTE was normal with EF: 70%. His albumin is low at 2.6 which may also be contributing.     3. Altered mental status - Unclear etiology for his delirium. He has been on steroids previously without developing delirium. I am concerned there may be a second process at play here. Will need to rule out hypercapnia and infection. His has no history of thyroid disease.    Recommendations:  - Oxygen supplementation to keep spO2 88-92%  - Methyprednisolone 40mg IV q12 hours   - Duonebs q4 hours  - Budesonide BID   - Continue mucinex  - Aspiration precautions   - Obtain sputum sample if possible  - Check expanded viral panel   - Check B-type NP   - LE dopplers   - Check ABG to for hypercapnia   - TTE  - Diuresis given new LE edema   - Infectious workup including UA, blood cultures, TSH with reflex T4     Thank you for the consultation. We will continue to follow along closely with you.    Please page [phone] with any questions/concerns.       Rubén Copeland DO  Pulmonary/Critical Care Medicine Fellow  PGY-4  1/7/2020  4:54 PM

## 2020-01-07 NOTE — H&P
Internal Medicine  History & Physical        CHIEF COMPLAINT   Dyspnea, AMS     HISTORY OF PRESENT ILLNESS      This is an 83 yo M w/a PMHx of COPD on home 2 L NC, HTN and CKD who presents to [facility] [place] w/ dyspnea and AMS.    Patient was a poor historian at time of interview, consequently details were provided by family who was present at the bedside at the time of interview as well as documentaion.    For more clinical context, patient was recently hospitalized at [facility] [place] from 12/23/2019 - 1/5/2020 for a COPD exacerbation, where he was treated w/ IV steroids and Abx w/ slow improvement however was ultimately discharged home on a prednisone taper.  Per the family, the patient was noticeably more confused a little over 24 hours after returning home.  There were endorsed complaints of worsening SOB, productive cough expectorating yellow mucus in addition to mumbling in sleep.  No known orthopnea or PND.  There were no witnessed aspiration events, and patient denied fevers, chills, rigors, or diaphoresis.  There were no noteable sick contacts.    Upon further questioning, he has new onset LE edema.    He was seen by the pulmonary team in the ER.    PAST MEDICAL AND SURGICAL HISTORY      PMHx:  Past Medical History:   Diagnosis Date   • Chronic kidney disease    • COPD (chronic obstructive pulmonary disease) (CMS/[facility])    • Diverticulitis of colon     1987   • Emphysema lung (CMS/HCC)    • Hypertension    • Prostate cancer (CMS/HCC)     2007 s/p RT withouut recurrence       PSHx:  Past Surgical History:   Procedure Laterality Date   • COLON SURGERY     • MULTIPLE TOOTH EXTRACTIONS  Sept.-Nov 2018   • NASAL SEPTUM SURGERY     • SINUS SURGERY  11/2012 11/2012 by Dr. Mora       PCP:   Bella Walters MD    MEDICATIONS          Home medications were personally reviewed.    ALLERGIES      Vancomycin and Amoxicillin-pot clavulanate    FAMILY HISTORY      Family History   Problem Relation Age of Onset   • Melanoma Biological  Mother    • Melanoma Biological Sister        SOCIAL HISTORY      Social History     Socioeconomic History   • Marital status:      Spouse name: None   • Number of children: None   • Years of education: None   • Highest education level: None   Occupational History   • None   Social Needs   • Financial resource strain: None   • Food insecurity:     Worry: None     Inability: None   • Transportation needs:     Medical: None     Non-medical: None   Tobacco Use   • Smoking status: Former Smoker     Packs/day: 2.00     Years: 40.00     Pack years: 80.00     Types: Cigarettes     Start date: 1954     Last attempt to quit: 1993     Years since quittin.4   • Smokeless tobacco: Never Used   Substance and Sexual Activity   • Alcohol use: No     Frequency: Never     Binge frequency: Never     Comment: stopped in    • Drug use: No   • Sexual activity: Defer   Lifestyle   • Physical activity:     Days per week: None     Minutes per session: None   • Stress: None   Relationships   • Social connections:     Talks on phone: None     Gets together: None     Attends [employer] service: None     Active member of club or organization: None     Attends meetings of clubs or organizations: None     Relationship status: None   • Intimate partner violence:     Fear of current or ex partner: None     Emotionally abused: None     Physically abused: None     Forced sexual activity: None   Other Topics Concern   • None   Social History Narrative   • None       REVIEW OF SYSTEMS      Review of Systems   Constitutional: Positive for activity change and fatigue. Negative for chills, diaphoresis and fever.   HENT: Negative for postnasal drip, sore throat and trouble swallowing.    Eyes: Negative for photophobia and visual disturbance.   Respiratory: Positive for cough, shortness of breath and wheezing.    Cardiovascular: Positive for leg swelling. Negative for palpitations.   Gastrointestinal: Negative for diarrhea, nausea  and vomiting.   Endocrine: Negative for cold intolerance, heat intolerance, polydipsia and polyuria.   Genitourinary: Negative for difficulty urinating and dysuria.   Neurological: Negative for syncope and light-headedness.   Psychiatric/Behavioral: Positive for confusion and decreased concentration. The patient is not nervous/anxious.        PHYSICAL EXAMINATION      Temp:  [36.6 °C (97.9 °F)] 36.6 °C (97.9 °F)  Heart Rate:  [75-83] 79  Resp:  [20-22] 21  BP: (132-154)/(64-78) 149/75  There is no height or weight on file to calculate BMI.    Physical Exam   Constitutional: No distress.   Chronically ill, but non-toxic appearing   HENT:   Head: Normocephalic and atraumatic.   Eyes: Pupils are equal, round, and reactive to light. No scleral icterus.   Neck: No JVD present.   Cardiovascular: Normal rate and regular rhythm.   Pulmonary/Chest: He has wheezes.   Bibasilar crackles   Abdominal: Soft. Bowel sounds are normal. He exhibits no distension.   Musculoskeletal:   Pitting edema B/L L> R   Neurological:   AAO X3, easily distracted   Skin: Skin is warm and dry. Capillary refill takes less than 2 seconds. He is not diaphoretic.   Vitals reviewed.          LABS / IMAGING / EKG        Labs  Lab Results   Component Value Date    WBC 19.81 (H) 01/07/2020    HGB 15.5 01/07/2020    HCT 45.8 01/07/2020    MCV 95.0 01/07/2020     01/07/2020     Lab Results   Component Value Date    GLUCOSE 266 (H) 01/07/2020    CALCIUM 8.4 (L) 01/07/2020     (L) 01/07/2020    K 5.2 (H) 01/07/2020    CO2 28 01/07/2020    CL 98 01/07/2020    BUN 28 (H) 01/07/2020    CREATININE 1.2 01/07/2020     ,      Comprehensive ABG (venous) - 7.29/73/28    VRP negative  Imaging  CXR: The lungs are clear.  The heart size is normal.  The osseous and soft  tissues are unremarkable.  Stable midthoracic compression fracture    ECG/Telemetry  NSR 84 bpm QTc 425 ms    ASSESSMENT AND PLAN           Acute on chronic respiratory failure with  hypoxia and hypercapnia (CMS/HCC)  Assessment & Plan  Patient presents with increased oxygen requirements and a pCO2 of 73 compared to pCO2 of 53 from 12/2019 admission  VRP neg,   Dx favorable for increased CO2 retention 2/2 COPD flare with weaning steroids +/- increased fluid retention or infectious etiology vs superimposed VTE in setting of recent hospitalization  - IV methylpred 40 mg q 12 hours  - budesonide nebs q 12 hours, standing albuterol-ipratropium q 4 hours  - montelukast  - trial of IV furosemide 20 mg x 1 and TTE ordered  - low threshold for PE eval  - empiric doxycycline and metronidazole, sputum cx if expectorates a sample  - follow up blood cx, for completion  - pulmonology following, appreciate recommendations    Encephalopathy  Assessment & Plan  Dx most favorable for 2/2 acute on chronic hypercapnea  - BiPAP ordered  - follow up UA and blood cx for completion    Lower extremity edema  Assessment & Plan  Patient with reported new onset LE swelling L> R  ddx 3rd spacing from low albumin vs DVT vs cardiac-related  - follow up B/L LE dopplers  - follow up TTE    Diabetes mellitus (CMS/HCC)  Assessment & Plan  1/7/2020 Hemoglobin A1c 7.8%   Likely to become more hyperglycemic on steroids  - CF with accuchecks for now  - would benefit from diabetes edu prior to discharge    Chronic kidney disease (CKD), stage III (moderate) (CMS/HCC)  Assessment & Plan  Cr around baseline  - trend with chemistries  - renally dose medications    GERD (gastroesophageal reflux disease)  Assessment & Plan  - PPI and H2 blocker    History of prostate cancer  Assessment & Plan  - finasteride, tamsulosin    Hyperlipidemia  Assessment & Plan  - atorvastatin    Hypertension  Assessment & Plan  - amlodipine, diltiazem and carvedilol with hold parameters       VTE Assessment: Padua    Code Status: Full Code  Estimated discharge date: 1/10/2020     ATTENDING DOCUMENTATION  ALSO SEE ATTENDING ATTESTATION SECTION OF NOTE

## 2020-01-07 NOTE — ASSESSMENT & PLAN NOTE
Patient presents with increased oxygen requirements and a pCO2 of 73 compared to pCO2 of 53 from 12/2019 admission  Dx favorable for increased CO2 retention 2/2 COPD flare   S/p 7 day course of doxycycline  - discharged on prednisone taper  - budesonide nebs q 12 hours and PRN duonebs  - montelukast  - BiPAP QHS and PRN  - will need to follow up with pulmonology as an outpatient

## 2020-01-07 NOTE — ED PROVIDER NOTES
HPI     Chief Complaint   Patient presents with   • Shortness of Breath     Patient was just here recently for same.  Patient Also brought in here for contusion.  Patient recently on Ativan   • Altered Mental Status       84-year-old male with a history of COPD presents with worsening COPD and wheezing symptoms over the last 24 hours.  He recent was discharged several days ago after a 2-week stay he was given p.o. antibiotics and Zithromax and was sent home doing quite well.  He has had increased agitation at night and has been talking in his sleep and being confused according to family over the last 2 days.  He seems awake alert and oriented here.  He got worse his home medications were not helping they call the pulmonary office who directed him to the ER.           Patient History     Past Medical History:   Diagnosis Date   • COPD (chronic obstructive pulmonary disease) (CMS/[facility])    • Diverticulitis of colon        • Emphysema lung (CMS/[facility])    • Hypertension    • Prostate cancer (CMS/HCC)      s/p RT withouut recurrence       Past Surgical History:   Procedure Laterality Date   • COLON SURGERY     • MULTIPLE TOOTH EXTRACTIONS  Sept.-2018   • NASAL SEPTUM SURGERY     • SINUS SURGERY  2012 by Dr. Mora       Family History   Problem Relation Age of Onset   • Melanoma Biological Mother    • Melanoma Biological Sister        Social History     Tobacco Use   • Smoking status: Former Smoker     Packs/day: 2.00     Years: 40.00     Pack years: 80.00     Types: Cigarettes     Start date: 1954     Last attempt to quit: 1993     Years since quittin.4   • Smokeless tobacco: Never Used   Substance Use Topics   • Alcohol use: No     Frequency: Never     Binge frequency: Never     Comment: stopped in    • Drug use: No       Systems Reviewed from Nursing Triage:  Tobacco  Allergies  Meds  Problems  Med Hx  Surg Hx  Fam Hx  Soc Hx           Review of Systems     Review of  Systems   Constitutional: Negative for chills and fever.   HENT: Negative for congestion.    Respiratory: Positive for cough, chest tightness, shortness of breath and wheezing.    Cardiovascular: Negative for chest pain.   Gastrointestinal: Negative for abdominal pain.   Genitourinary: Negative for flank pain.   Musculoskeletal: Negative for back pain.   Neurological: Negative for speech difficulty.   Psychiatric/Behavioral: Negative for confusion.        Physical Exam     ED Triage Vitals   Temp Heart Rate Resp BP SpO2   01/07/20 1301 01/07/20 1251 01/07/20 1251 01/07/20 1301 01/07/20 1251   36.6 °C (97.9 °F) 83 (!) 22 (!) 154/69 93 %      Temp Source Heart Rate Source Patient Position BP Location FiO2 (%) (Set)   01/07/20 1301 01/07/20 1251 01/07/20 1301 01/07/20 1301 --   Oral Monitor Sitting Right upper arm        Pulse Ox %: 97 % (01/07/20 1613)  Pulse Ox Interpretation: Normal (01/07/20 1613)  Heart Rate: 77 (01/07/20 1613)  Rhythm Strip Interpretation: Normal Sinus Rhythm (01/07/20 1613)    Patient Vitals for the past 24 hrs:   BP Temp Temp src Pulse Resp SpO2   01/07/20 1713 134/76 -- -- 75 20 100 %   01/07/20 1601 (!) 150/77 -- -- 77 20 97 %   01/07/20 1301 (!) 154/69 36.6 °C (97.9 °F) Oral -- -- 99 %   01/07/20 1251 -- -- -- 83 (!) 22 93 %                                       Physical Exam   Constitutional: He is oriented to person, place, and time. He appears well-developed and well-nourished.   HENT:   Head: Normocephalic and atraumatic.   Eyes: Conjunctivae are normal.   Neck: Neck supple.   Cardiovascular: Normal rate and regular rhythm.   Pulmonary/Chest: Effort normal. He has rhonchi.   Abdominal: Soft. Bowel sounds are normal.   Musculoskeletal: Normal range of motion.   Neurological: He is alert and oriented to person, place, and time.   Skin: Skin is warm and dry. Capillary refill takes less than 2 seconds.   Psychiatric: He has a normal mood and affect.            Critical Care  Performed by:  Dylan Zavala DO  Authorized by: Dylan Zavala DO     Critical care provider statement:     Critical care time (minutes):  45    Critical care was necessary to treat or prevent imminent or life-threatening deterioration of the following conditions:  Respiratory failure    Critical care was time spent personally by me on the following activities:  Development of treatment plan with patient or surrogate, discussions with consultants, evaluation of patient's response to treatment, examination of patient, interpretation of cardiac output measurements, ordering and performing treatments and interventions, ordering and review of laboratory studies, ordering and review of radiographic studies, pulse oximetry, re-evaluation of patient's condition and review of old charts        ED Course & MDM     Labs Reviewed   COMPREHENSIVE METABOLIC PANEL - Abnormal       Result Value    Sodium 134 (*)     Potassium 5.2 (*)     Chloride 98      CO2 28      BUN 28 (*)     Creatinine 1.2      Glucose 266 (*)     Calcium 8.4 (*)     AST (SGOT) 18      ALT (SGPT) 34      Alkaline Phosphatase 66      Total Protein 5.3 (*)     Albumin 2.7 (*)     Bilirubin, Total 0.6      eGFR 58.0 (*)     Anion Gap 8     CBC - Abnormal    WBC 19.81 (*)     RBC 4.82      Hemoglobin 15.5      Hematocrit 45.8      MCV 95.0      MCH 32.2      MCHC 33.8      RDW 13.2      Platelets 210      MPV 9.8     DIFF COUNT - Abnormal    Differential Type Auto      nRBC 0.0      Immature Granulocytes 4.7      Neutrophils 89.5      Lymphocytes 2.1      Monocytes 2.8      Eosinophils 0.1      Basophils 0.8      Immature Granulocytes, Absolute 0.93 (*)     Neutrophils, Absolute 17.74 (*)     Lymphocytes, Absolute 0.42 (*)     Monocytes, Absolute 0.55      Eosinophils, Absolute 0.01 (*)     Basophils, Absolute 0.16 (*)    PROTIME-INR - Normal    PT 12.2      INR 0.9     PTT - Normal    PTT 23     TROPONIN I - Normal    Troponin I <0.03     CBC AND DIFFERENTIAL     Narrative:     The following orders were created for panel order CBC and differential.  Procedure                               Abnormality         Status                     ---------                               -----------         ------                     CBC[[id number]]                          Abnormal            Final result               Diff Count[[id number]]                   Abnormal            Final result                 Please view results for these tests on the individual orders.       X-RAY CHEST 2 VIEWS   Final Result   IMPRESSION: No definite active disease.      ECG 12 lead   ED Interpretation   Normal sinus rhythm at 84, left axis deviation, normal QRS duration, normal ST-T segments            US venous leg, bilateral    (Results Pending)               MDM  Number of Diagnoses or Management Options  Acute delirium:   Bilateral leg edema:   COPD exacerbation (CMS/[facility]):   Shortness of breath:   Diagnosis management comments: Patient is an 84-year-old male that presents with worsening respiratory symptoms overnight.  He got discharged from the hospital 2 days ago after a 2-week stay for COPD exacerbation.  He was discharged on prednisone with a taper and Zithromax.  Patient apparently has gurgling and voice change which gurgling has been there in the past the voice change is new over the last 24 hours.  He has no fever here.    Differential diagnosis includes COPD exacerbation, doubt acute infection or PE, check x-ray rule out CHF he is developed worsening leg edema this could be from right heart strain or failure versus fluid overload from prednisone.  Discussed with pulmonary to see in consultation since he was sent in by the lung doctor.       Amount and/or Complexity of Data Reviewed  Clinical lab tests: reviewed and ordered  Tests in the radiology section of CPT®: ordered and reviewed  Tests in the medicine section of CPT®: ordered and reviewed  Decide to obtain previous medical records or to  obtain history from someone other than the patient: yes  Discuss the patient with other providers: yes  Independent visualization of images, tracings, or specimens: yes    Risk of Complications, Morbidity, and/or Mortality  Presenting problems: high  Diagnostic procedures: moderate  Management options: moderate             ED Course as of Jan 07 1723   Tue Jan 07, 2020   1704 Case discussed with pulmonary fellow.  They feel he should be admitted the leg edema needs to be worked up along with this delirium which could be sleep deprivation versus steroid effect.  He seems pretty calm with me but the wife states significant activity at night where he is reaching for things and acting inappropriately.  He knew the date and time ,the name of the president and the name of the hospital.    [SC]   1717 Discussed with the hospitalist and admitting resident.  Will get bilateral leg ultrasounds for the new edema.  Patient received a DuoNeb.  He remained stable.    [SC]      ED Course User Index  [SC] Dylan Zavala DO         Clinical Impressions as of Jan 07 1723   Shortness of breath   COPD exacerbation (CMS/[facility])   Acute delirium   Bilateral leg edema        Dylan Zavala DO  01/07/20 1723

## 2020-01-07 NOTE — PROGRESS NOTES
Spoke with the patient and confirmed with medication list from [facility] and/or patient's own pharmacy to complete the medication reconciliation.     Prior to admission medication list:  Current Outpatient Medications:   •  albuterol 2.5 mg /3 mL (0.083 %) nebulizer solution, Take 2.5 mg by nebulization every 8 (eight) hours as needed for wheezing or shortness of breath.  , Disp: , Rfl:   •  albuterol HFA (VENTOLIN HFA) 90 mcg/actuation inhaler, Inhale 2 puffs every 4 (four) hours as needed for wheezing.  , Disp: , Rfl:   •  alendronate (FOSAMAX) 70 mg tablet, Take 70 mg by mouth once a week on Friday.  , Disp: , Rfl:   •  amLODIPine (NORVASC) 5 mg tablet, Take 5 mg by mouth daily., Disp: , Rfl:   •  atorvastatin (LIPITOR) 10 mg tablet, Take 10 mg by mouth daily., Disp: , Rfl:   •  budesonide (PULMICORT) 0.5 mg/2 mL nebulizer solution, Take 0.5 mg by nebulization 2 (two) times a day. Rinse mouth with water after use to reduce aftertaste and incidence of candidiasis. Do not swallow., Disp: , Rfl:   •  budesonide-formoterol (SYMBICORT) 160-4.5 mcg/actuation inhaler, Inhale 2 puffs 2 (two) times a day as needed (For use when not around a nebulizer). Rinse mouth with water after use to reduce aftertaste and incidence of candidiasis. Do not swallow., Disp: , Rfl:   •  carvedilol (COREG) 3.125 mg tablet, Take 3.125 mg by mouth 2 (two) times a day with meals., Disp: , Rfl:   •  chlorhexidine (PERIDEX) 0.12 % solution, Swish and spit 15 mL 2 (two) times a day., Disp: , Rfl:   •  dilTIAZem CD (CARDIZEM CD) 120 mg 24 hr capsule, Take 120 mg by mouth daily., Disp: , Rfl:   •  escitalopram (LEXAPRO) 10 mg tablet, Take 10 mg by mouth daily.  , Disp: , Rfl:   •  famotidine (PEPCID) 40 mg tablet, Take 40 mg by mouth 2 times daily., Disp: , Rfl:   •  fexofenadine (ALLEGRA) 180 mg tablet, Take 180 mg by mouth daily., Disp: , Rfl:   •  finasteride (PROSCAR) 5 mg tablet, Take 5 mg by mouth daily., Disp: , Rfl:   •  formoterol  (PERFOROMIST) 20 mcg/2 mL nebulizer solution, Take 20 mcg by nebulization 2 (two) times a day., Disp: , Rfl:   •  guaiFENesin (MUCINEX) 600 mg 12 hr tablet, Take 1 tablet (600 mg total) by mouth 2 (two) times a day for 10 days., Disp: 20 tablet, Rfl: 0  •  LORazepam (ATIVAN) 0.5 mg tablet, Take 0.5 mg by mouth daily as needed for anxiety.  , Disp: , Rfl:   •  montelukast (SINGULAIR) 10 mg tablet, Take 10 mg by mouth nightly.  , Disp: , Rfl:   •  omeprazole (PriLOSEC) 20 mg capsule, Take 20 mg by mouth daily before breakfast., Disp: , Rfl:   •  predniSONE (DELTASONE) 10 mg tablet, Take 5 tabs (50mg) daily for 2 days, then take 4 tabs (40mg) daily for 2 days. Continue to decrease by 1 tab (10mg) every 2 days until gone., Disp: 30 tablet, Rfl: 0  •  tamsulosin (FLOMAX) 0.4 mg capsule, Take 0.4 mg by mouth 2 times daily., Disp: , Rfl:   •  tiotropium bromide (SPIRIVA RESPIMAT) 2.5 mcg/actuation mist inhaler, Inhale 2 puffs daily., Disp: , Rfl:     Comments about home medications:  -Per Miguel, patient picked up prednisone 10 mg taper on 1/6/2020. Patient states he took this medication yesterday (1/6/2020) and today (1/7/2020) as directed.     -Per the [facility], patient last filled a Spiriva 2.5 mcg inhaler on 11/22/19 for 90 days. Per discharge notes on 1/5/2020, the patient was instructed to continue taking his Spiriva handihaler. The [facility] states that the order for the handihaler was discontinued and replaced by the 2.5 mcg inhaler.     -Per [facility], patient last filled Symbicort inhaler on 11/15/19 for 90 days. Patient states his doctor told him to only use this when he is unable to use a nebulizer. He does not use this medication otherwise.     -Per [facility], patient last filled amlodipine 5 mg on 9/8/19 for 90 days, budesonide on 5/20/19 for 90 days, carvedilol 3.125 mg on 10/16/19 for 45 days, diltiazem 120 mg on 9/19/19 for 90 days, famotidine on 11/18/19 for 30 days, formoterol on 11/18/19 for 30 days, montelukast  10 mg on 9/11/19 for 90 days, and tamsulosin 0.4 mg on 9/18/19 for 90 days. The [facility] has not filled these medications for the patient. The [facility] only fills the patient's inhalers.

## 2020-01-07 NOTE — ASSESSMENT & PLAN NOTE
Patient with reported new onset LE swelling L> R  ddx 3rd spacing from low albumin vs DVT vs cardiac-related  - follow up TTE

## 2020-01-08 ENCOUNTER — APPOINTMENT (INPATIENT)
Dept: CARDIOLOGY | Facility: HOSPITAL | Age: 85
DRG: 190 | End: 2020-01-08
Attending: STUDENT IN AN ORGANIZED HEALTH CARE EDUCATION/TRAINING PROGRAM
Payer: COMMERCIAL

## 2020-01-08 LAB
ANION GAP SERPL CALC-SCNC: 10 MEQ/L (ref 3–15)
ATRIAL RATE: 84
BASE EXCESS BLDA CALC-SCNC: 7.8 MEQ/L
BASOPHILS # BLD: 0.08 K/UL (ref 0.01–0.1)
BASOPHILS NFR BLD: 0.4 %
BSA FOR ECHO PROCEDURE: 1.85 M2
BUN SERPL-MCNC: 32 MG/DL (ref 8–20)
CALCIUM SERPL-MCNC: 8 MG/DL (ref 8.9–10.3)
CHLORIDE SERPL-SCNC: 98 MEQ/L (ref 98–109)
CO2 SERPL-SCNC: 29 MEQ/L (ref 22–32)
CREAT SERPL-MCNC: 1.2 MG/DL
DIFFERENTIAL METHOD BLD: ABNORMAL
E WAVE DECELERATION TIME: 261 MS
E/A RATIO: 0.5
E/E' RATIO: 12.2
E/LAT E' RATIO: 8.9
EDV (BP): 60 CM3
EF (A4C): 92 %
EF A2C: 74 %
EJECTION FRACTION: 87 %
EOSINOPHIL # BLD: 0 K/UL (ref 0.04–0.54)
EOSINOPHIL NFR BLD: 0 %
ERYTHROCYTE [DISTWIDTH] IN BLOOD BY AUTOMATED COUNT: 13.2 % (ref 11.6–14.4)
ESV (BP): 8 CM3
FIO2 ON VENT: 40 %
GFR SERPL CREATININE-BSD FRML MDRD: 58 ML/MIN/1.73M*2
GLUCOSE BLD-MCNC: 191 MG/DL (ref 70–99)
GLUCOSE BLD-MCNC: 198 MG/DL (ref 70–99)
GLUCOSE BLD-MCNC: 246 MG/DL (ref 70–99)
GLUCOSE SERPL-MCNC: 223 MG/DL (ref 70–99)
HCO3 BLDA-SCNC: 34.7 MEQ/L (ref 21–28)
HCT VFR BLDCO AUTO: 43.5 % (ref 40.1–51)
HGB BLD-MCNC: 14.9 G/DL
IMM GRANULOCYTES # BLD AUTO: 0.62 K/UL (ref 0–0.08)
IMM GRANULOCYTES NFR BLD AUTO: 3.5 %
INHALED O2 CONCENTRATION: ABNORMAL %
LA ESV INDEX (A2C): 4.86 CM3/M2
LAAS-AP2: 6.63 CM2
LAAS-AP4: 9.43 CM2
LALD A4C: 3.92 CM
LALD A4C: 4.94 CM
LAV-S: 9 CM3
LEFT ATRIUM VOLUME INDEX: 7.57 CM3/M2
LEFT ATRIUM VOLUME: 14 CM3
LEFT VENTRICLE DIASTOLIC VOLUME INDEX: 39.46 CM3/M2
LEFT VENTRICLE DIASTOLIC VOLUME: 73 CM3
LEFT VENTRICLE SYSTOLIC VOLUME INDEX: 3.24 CM3/M2
LEFT VENTRICLE SYSTOLIC VOLUME: 6 CM3
LV DIASTOLIC VOLUME: 43 CM3
LV ESV (APICAL 2 CHAMBER): 11 CM3
LVAD-AP2: 18.5 CM2
LVAD-AP4: 25.6 CM2
LVAS-AP2: 8.27 CM2
LVAS-AP4: 6.39 CM2
LVEDVI(A2C): 23.24 CM3/M2
LVEDVI(BP): 32.43 CM3/M2
LVESVI(A2C): 5.95 CM3/M2
LVESVI(BP): 4.32 CM3/M2
LVLD-AP2: 6.48 CM
LVLD-AP4: 7.3 CM
LVLS-AP2: 5.47 CM
LVLS-AP4: 5.68 CM
LVOT MG: 3 MMHG
LVOT MV: 0.72 M/S
LVOT PEAK VELOCITY: 1.12 M/S
LVOT PG: 5 MMHG
LVOT VTI: 21.7 CM
LYMPHOCYTES # BLD: 0.29 K/UL (ref 1.2–3.5)
LYMPHOCYTES NFR BLD: 1.6 %
MAGNESIUM SERPL-MCNC: 2.1 MG/DL (ref 1.8–2.5)
MCH RBC QN AUTO: 32 PG (ref 28–33.2)
MCHC RBC AUTO-ENTMCNC: 34.3 G/DL (ref 32.2–36.5)
MCV RBC AUTO: 93.5 FL (ref 83–98)
MONOCYTES # BLD: 0.31 K/UL (ref 0.3–1)
MONOCYTES NFR BLD: 1.7 %
MRSA DNA SPEC QL NAA+PROBE: NEGATIVE
MV E'TISSUE VEL-LAT: 0.07 M/S
MV E'TISSUE VEL-MED: 0.05 M/S
MV PEAK A VEL: 1.15 M/S
MV PEAK E VEL: 0.59 M/S
NEUTROPHILS # BLD: 16.57 K/UL (ref 1.7–7)
NEUTS SEG NFR BLD: 92.8 %
NRBC BLD-RTO: 0 %
P AXIS: 13
PCO2 BLDA: 56 MM HG (ref 35–48)
PDW BLD AUTO: 9.7 FL (ref 9.4–12.4)
PH BLDA: 7.4 [PH] (ref 7.35–7.45)
PLATELET # BLD AUTO: 180 K/UL
PO2 BLDA: 151 MM HG (ref 83–100)
POCT TEST: ABNORMAL
POTASSIUM SERPL-SCNC: 4.6 MEQ/L (ref 3.6–5.1)
PR INTERVAL: 156
QRS DURATION: 54
QT INTERVAL: 352
QTC CALCULATION(BAZETT): 415
R AXIS: -25
RBC # BLD AUTO: 4.65 M/UL (ref 4.5–5.8)
RVOT VMAX: 0.64 M/S
RVOT VTI: 13.1 CM
SODIUM SERPL-SCNC: 137 MEQ/L (ref 136–144)
T WAVE AXIS: 70
VENTRICULAR RATE: 84
WBC # BLD AUTO: 17.87 K/UL

## 2020-01-08 PROCEDURE — 63600000 HC DRUGS/DETAIL CODE: Performed by: STUDENT IN AN ORGANIZED HEALTH CARE EDUCATION/TRAINING PROGRAM

## 2020-01-08 PROCEDURE — 99233 SBSQ HOSP IP/OBS HIGH 50: CPT | Performed by: INTERNAL MEDICINE

## 2020-01-08 PROCEDURE — 80048 BASIC METABOLIC PNL TOTAL CA: CPT | Performed by: STUDENT IN AN ORGANIZED HEALTH CARE EDUCATION/TRAINING PROGRAM

## 2020-01-08 PROCEDURE — C8929 TTE W OR WO FOL WCON,DOPPLER: HCPCS

## 2020-01-08 PROCEDURE — 85025 COMPLETE CBC W/AUTO DIFF WBC: CPT | Performed by: STUDENT IN AN ORGANIZED HEALTH CARE EDUCATION/TRAINING PROGRAM

## 2020-01-08 PROCEDURE — 63700000 HC SELF-ADMINISTRABLE DRUG: Performed by: STUDENT IN AN ORGANIZED HEALTH CARE EDUCATION/TRAINING PROGRAM

## 2020-01-08 PROCEDURE — 25500000 HC DRUGS/INCIDENT RAD: Performed by: INTERNAL MEDICINE

## 2020-01-08 PROCEDURE — 93005 ELECTROCARDIOGRAM TRACING: CPT | Performed by: INTERNAL MEDICINE

## 2020-01-08 PROCEDURE — 93306 TTE W/DOPPLER COMPLETE: CPT | Mod: 26 | Performed by: INTERNAL MEDICINE

## 2020-01-08 PROCEDURE — 36415 COLL VENOUS BLD VENIPUNCTURE: CPT | Performed by: STUDENT IN AN ORGANIZED HEALTH CARE EDUCATION/TRAINING PROGRAM

## 2020-01-08 PROCEDURE — 83735 ASSAY OF MAGNESIUM: CPT | Performed by: STUDENT IN AN ORGANIZED HEALTH CARE EDUCATION/TRAINING PROGRAM

## 2020-01-08 PROCEDURE — 93010 ELECTROCARDIOGRAM REPORT: CPT | Performed by: INTERNAL MEDICINE

## 2020-01-08 PROCEDURE — 82803 BLOOD GASES ANY COMBINATION: CPT | Performed by: STUDENT IN AN ORGANIZED HEALTH CARE EDUCATION/TRAINING PROGRAM

## 2020-01-08 PROCEDURE — 25000000 HC PHARMACY GENERAL: Performed by: STUDENT IN AN ORGANIZED HEALTH CARE EDUCATION/TRAINING PROGRAM

## 2020-01-08 PROCEDURE — 94660 CPAP INITIATION&MGMT: CPT

## 2020-01-08 PROCEDURE — 21400000 HC ROOM AND CARE CCU/INTERMEDIATE

## 2020-01-08 PROCEDURE — 36600 WITHDRAWAL OF ARTERIAL BLOOD: CPT

## 2020-01-08 PROCEDURE — 25000000 HC PHARMACY GENERAL: Performed by: INTERNAL MEDICINE

## 2020-01-08 RX ADMIN — IPRATROPIUM BROMIDE AND ALBUTEROL SULFATE 3 ML: 2.5; .5 SOLUTION RESPIRATORY (INHALATION) at 17:22

## 2020-01-08 RX ADMIN — CETIRIZINE HYDROCHLORIDE 10 MG: 10 TABLET, FILM COATED ORAL at 21:39

## 2020-01-08 RX ADMIN — IPRATROPIUM BROMIDE AND ALBUTEROL SULFATE 3 ML: 2.5; .5 SOLUTION RESPIRATORY (INHALATION) at 13:39

## 2020-01-08 RX ADMIN — METHYLPREDNISOLONE SODIUM SUCCINATE 40 MG: 40 INJECTION, POWDER, FOR SOLUTION INTRAMUSCULAR; INTRAVENOUS at 05:55

## 2020-01-08 RX ADMIN — METHYLPREDNISOLONE SODIUM SUCCINATE 40 MG: 40 INJECTION, POWDER, FOR SOLUTION INTRAMUSCULAR; INTRAVENOUS at 17:19

## 2020-01-08 RX ADMIN — BUDESONIDE 0.5 MG: 0.5 INHALANT ORAL at 17:23

## 2020-01-08 RX ADMIN — PERFLUTREN: 6.52 INJECTION, SUSPENSION INTRAVENOUS at 09:30

## 2020-01-08 RX ADMIN — BUDESONIDE 0.5 MG: 0.5 INHALANT ORAL at 05:55

## 2020-01-08 RX ADMIN — TAMSULOSIN HYDROCHLORIDE 0.4 MG: 0.4 CAPSULE ORAL at 21:38

## 2020-01-08 RX ADMIN — MONTELUKAST 10 MG: 10 TABLET, FILM COATED ORAL at 21:38

## 2020-01-08 RX ADMIN — IPRATROPIUM BROMIDE AND ALBUTEROL SULFATE 3 ML: 2.5; .5 SOLUTION RESPIRATORY (INHALATION) at 01:38

## 2020-01-08 RX ADMIN — CARVEDILOL 3.12 MG: 3.12 TABLET, FILM COATED ORAL at 17:23

## 2020-01-08 RX ADMIN — IPRATROPIUM BROMIDE AND ALBUTEROL SULFATE 3 ML: 2.5; .5 SOLUTION RESPIRATORY (INHALATION) at 22:24

## 2020-01-08 RX ADMIN — INSULIN ASPART 3 UNITS: 100 INJECTION, SOLUTION INTRAVENOUS; SUBCUTANEOUS at 17:18

## 2020-01-08 RX ADMIN — IPRATROPIUM BROMIDE AND ALBUTEROL SULFATE 3 ML: 2.5; .5 SOLUTION RESPIRATORY (INHALATION) at 10:17

## 2020-01-08 RX ADMIN — IPRATROPIUM BROMIDE AND ALBUTEROL SULFATE 3 ML: 2.5; .5 SOLUTION RESPIRATORY (INHALATION) at 05:56

## 2020-01-08 RX ADMIN — ENOXAPARIN SODIUM 40 MG: 40 INJECTION, SOLUTION INTRAVENOUS; SUBCUTANEOUS at 17:21

## 2020-01-08 RX ADMIN — DOXYCYCLINE HYCLATE 100 MG: 100 TABLET, FILM COATED ORAL at 21:38

## 2020-01-08 RX ADMIN — GUAIFENESIN 600 MG: 600 TABLET ORAL at 19:48

## 2020-01-08 NOTE — ASSESSMENT & PLAN NOTE
1/7/2020 Hemoglobin A1c 7.8%   Steroid-induced hyperglycemia improving with decreased steroids  - will discharge with metformin 500 mg and insulin glargine 7 units QHS  - patient trained to use accuchecks and insulin injections

## 2020-01-08 NOTE — ASSESSMENT & PLAN NOTE
Dx most favorable for 2/2 acute on chronic hypercapnea, as resolved after BiPAP use  - plan to go home with BiPAP 18/8

## 2020-01-08 NOTE — PLAN OF CARE
Problem: Adult Inpatient Plan of Care  Goal: Plan of Care Review  Flowsheets (Taken 1/8/2020 0948)  Progress: improving  Plan of Care Reviewed With: patient  Outcome Summary: assist as needed     Problem: Adult Inpatient Plan of Care  Goal: Patient-Specific Goal (Individualization)  Flowsheets (Taken 1/8/2020 0948)  Patient-Specific Goals (Include Timeframe): discharge  Individualized Care Needs: assist as needed  Anxieties, Fears or Concerns: none     Problem: Adjustment to Illness COPD (Chronic Obstructive Pulmonary Disease)  Goal: Optimal Chronic Illness Coping  Outcome: Progressing

## 2020-01-08 NOTE — ASSESSMENT & PLAN NOTE
Cr back down to baseline 1.1  - hold off on further diuresis  - encourage PO intake  - trend with chemistries  - renally dose medications

## 2020-01-08 NOTE — NURSING NOTE
Assessment completed.  Repiratory Therapy in and placed patient on BiPAP.  Denies any pain or discomfort.  Repositioned.  HOB elevated.  Bed alarm on.  Call bell in reach.  Continue to monitor.

## 2020-01-08 NOTE — PLAN OF CARE
Problem: Adult Inpatient Plan of Care  Goal: Plan of Care Review  Outcome: Progressing  Flowsheets (Taken 1/8/2020 0412)  Progress: no change  Plan of Care Reviewed With: patient; daughter  Outcome Summary: Pt oriented to room, uses call bell appropriately. Plan of care reviewed

## 2020-01-08 NOTE — PROGRESS NOTES
" Pulmonary Progress Note       SUBJECTIVE     No acute events overnight. Bi-level was just initiated prior to my evaluation. Unchanged symptoms. Continues to be intermittently confused.      OBJECTIVE        Vital Signs:   Visit Vitals  /68 (BP Location: Right upper arm, Patient Position: Lying)   Pulse 81   Temp 36.4 °C (97.6 °F) (Axillary)   Resp 13   Ht 1.626 m (5' 4\")   Wt 75.8 kg (167 lb)   SpO2 95%   BMI 28.67 kg/m²       I/O's:    Intake/Output Summary (Last 24 hours) at 1/8/2020 0851  Last data filed at 1/8/2020 0600  Gross per 24 hour   Intake --   Output 450 ml   Net -450 ml       Medications:    Reviewed. Pertinent medications as below.    • amLODIPine  5 mg oral Daily   • atorvastatin  10 mg oral Daily   • budesonide  0.5 mg nebulization BID (6a, 6p)   • carvedilol  3.125 mg oral BID with meals   • cetirizine  10 mg oral Nightly   • dilTIAZem CD  120 mg oral Daily   • doxycycline hyclate  100 mg oral q12h AASEL   • enoxaparin  40 mg subcutaneous Daily (6p)   • escitalopram  10 mg oral Daily   • famotidine  40 mg oral Daily   • finasteride  5 mg oral Daily   • guaiFENesin  600 mg oral BID   • insulin aspart U-100  3-5 Units subcutaneous TID with meals   • ipratropium-albuterol  3 mL nebulization q4h ASAEL   • methylPREDNISolone sodium succinate  40 mg intravenous q12h INT   • metroNIDAZOLE  500 mg oral BID   • montelukast  10 mg oral Nightly   • pantoprazole  20 mg oral Daily   • [COMPLETED] perflutren lipid microsphere   intravenous Once   • tamsulosin  0.4 mg oral Nightly       Anti-infectives (From admission, onward)    Start     Dose/Rate Route Frequency Ordered Stop    01/07/20 2125  metroNIDAZOLE (FLAGYL) tablet 500 mg      500 mg oral 2 times daily 01/07/20 2120 01/07/20 2125  doxycycline hyclate (VIBRA-TABS) tablet 100 mg      100 mg oral Every 12 hours 01/07/20 2121            PHYSICAL EXAMINATION        General: The patient is in no acute distress.  Resting comfortably in bed. He is alert " and oriented x3 but altered and confused at times.   HEENT: Mucous membranes are moist.  Sclera are anicteric.  Neck: Supple.  No cervical lymphadenopathy  Cardiovascular: S1 and S2 are present.  There are no murmurs.  Lungs: Distant breath sounds bilaterally, rales at bases bilaterally, wheezes more superiorly.   Abdomen: Soft, nontender and nondistended  Extremities: Cool and dry with bilateral L>R pitting edema   Neuro: Awake and alert.  Spontaneously moving all extremities      Diagnostic Data      Labs:    I have personally reviewed all pertinent patient laboratory results. Labs of note discussed below:    ABG Results       01/07/20 12/23/19                       1813 1154          pH 7.29 --          pCO2 73 --          pO2 28 --          HCO3 35.1 --          Base Excess 5.4 --          Source Of Oxygen nc bipap                       CMP Results       01/07/20 01/05/20 01/04/20                    1442 0650 0718          134 136         K 5.2 4.9 4.9         Cl 98 97 98         CO2 28 26 26         Glucose 266 182 194         BUN 28 33 33         Creatinine 1.2 1.1 1.2         Calcium 8.4 8.0 8.0         Anion Gap 8 11 12         AST 18 -- --         ALT 34 -- --         Albumin 2.7 -- --         EGFR 58.0 >60.0 58.0         Comment for K at 1442 on 01/07/20:    Results obtained on plasma. Plasma Potassium values may be up to 0.4 mEQ/L less than serum values. The differences may be greater for patients with high platelet or white cell counts.        CBC Results       01/07/20 01/02/20 01/01/20                    1442 0623 0702         WBC 19.81 18.05 17.34         RBC 4.82 4.69 4.61         HGB 15.5 14.7 14.7         HCT 45.8 43.8 42.9         MCV 95.0 93.4 93.1         MCH 32.2 31.3 31.9         MCHC 33.8 33.6 34.3          253 252                     Imaging:    I have reviewed all pertinent imaging which is discussed below.    ASSESSMENT AND PLAN      This is an 84 year old male with PMHx of  COPD/emphysema with frequent exacerations, HTN who presented with shortness of breath and delirium.      1. COPD exacerbation- Recently admitted for COPD exacerbation that was very slow to recover. He was discharged 2 days ago on 2LNC on a prednisone taper and was doing well. The night prior to admission, he was noted to have increased shortness of breath and productive cough with yellow sputum. He had started his taper and was on 30mg of prednisone daily at this point. His chest x-ray is clear without infiltrates, edema, or effusions. He has wheezing primarily in his upper lobes with rales in bilateral lower lobes.   - Evidence of hypercapnia on VBG obtained yesterday. Placed on BiLevel PAP     2. Lower extremity edema - New onset bilateral L>R lower extremity edema which may be related to steroids, but cannot rule out new onset CHF. His last TTE was normal with EF: 70%. His albumin is low at 2.6 which may also be contributing.   - LE dopplers negative for DVT   - BNP slightly elevated at 103      3. Altered mental status - Unclear etiology for his delirium. He has been on steroids previously without developing delirium. I am concerned there may be a second process at play here. May be related to hypercapnia given pCO2 of 73 on VBG on admission. His has no history of thyroid disease. No evidence of infection so far concerned he may have developed pneumonia. . UA negative, viral panel negative. Blood cultures NGTD.C     Recommendations:   - Oxygen supplementation to keep spO2 88-92%  - Methyprednisolone 40mg IV q12 hours   - BiPAP for hypercapnia   - CT chest without IV contrast concerned may have developed pneumonia   - Stop metronidazole   - Duonebs q4 hours  - Budesonide BID   - Continue mucinex  - Aspiration precautions   - Obtain sputum sample if possible  - TTE performed and pending  - Diuresis given new LE edema especially if TTE shows dilated IVC   - Follow-up blood cultures and TSH    Thank you for the  consultation. We will continue to follow along closely with you.    Please page [phone] with any questions/concerns.         Rubén Copeland, DO  1/8/2020  Pulmonary & Critical Care Fellow  PGY-4

## 2020-01-08 NOTE — NURSING NOTE
Pt received in bed at 1500 on a BPAP , resting comfortably , family members present , ask that pt not be touch as he is resting for the first time ,  safety round checked every 2  hrs , , will continue to monitor

## 2020-01-08 NOTE — PROGRESS NOTES
"     Internal Medicine  Daily Progress Note       SUBJECTIVE   This is a 84 y.o. year-old male admitted on 2020 with Shortness of breath [R06.02]  Acute delirium [R41.0]  COPD exacerbation (CMS/HCC) [J44.1]  Bilateral leg edema [R60.0].    Interval History: This AM it was found that patient was never placed on BiPAP overnight, and it was not due to patient refusal to wear machine.  Upon bedside evaluation, patient was arousable to voice and gentle probing and offered no complaints.    I called the respiratory therapist who set up BiPAP ordered overnight.  When stopped by to see the patient an hour after he was placed on BiPAP, he admitted to improved sx.     OBJECTIVE      Vital signs in last 24 hours:  Temp:  [36.4 °C (97.6 °F)] 36.4 °C (97.6 °F)  Heart Rate:  [75-97] 81  Resp:  [13-22] 13  BP: (123-150)/(64-78) 123/68  FiO2 (%) (Set):  [30 %-40 %] 40 %  Temp (24hrs), Av.4 °C (97.6 °F), Min:36.4 °C (97.6 °F), Max:36.4 °C (97.6 °F)        PHYSICAL EXAMINATION      PHYSICAL EXAM  Vitals: Visit Vitals  /68 (BP Location: Right upper arm, Patient Position: Lying)   Pulse 81   Temp 36.4 °C (97.6 °F) (Axillary)   Resp 13   Ht 1.626 m (5' 4\")   Wt 75.8 kg (167 lb)   SpO2 95%   BMI 28.67 kg/m²      General: Non-toxic appearing   HEENT: Markedly dry mucous membranes  No JVD  No cervical lymphadenopathy   Eyes: Conjunctiva clear, PERRLA   Cardiac: RRR  No murmurs, rubs, or gallops   Pulmonary: Bibasilar crackles, end-expiratory wheezes   Abdomen: Soft, non-tender, +BS  No rebound, no guarding       Extremities: B/L +1 pitting edema L > R   Neuro: AAO x 3, lethargic, but arousable to voice, follows basic commands    Psych: Appropriate, cooperative   Skin: Dry, onymycosis                 LABS / IMAGING / TELE      Labs  Lab Results   Component Value Date    WBC 17.87 (H) 2020    HGB 14.9 2020    HCT 43.5 2020    MCV 93.5 2020     2020     Lab Results   Component Value Date    " GLUCOSE 223 (H) 01/08/2020    CALCIUM 8.0 (L) 01/08/2020     01/08/2020    K 4.6 01/08/2020    CO2 29 01/08/2020    CL 98 01/08/2020    BUN 32 (H) 01/08/2020    CREATININE 1.2 01/08/2020     Lab Results   Component Value Date    ALT 34 01/07/2020    AST 18 01/07/2020     (H) 11/04/2016    ALKPHOS 66 01/07/2020    BILITOT 0.6 01/07/2020       Imaging  Imaging reviewed.     ECG/Telemetry  Telemetry reviewed.     ASSESSMENT AND PLAN      Acute on chronic respiratory failure with hypoxia and hypercapnia (CMS/[facility])  Assessment & Plan  Patient presents with increased oxygen requirements and a pCO2 of 73 compared to pCO2 of 53 from 12/2019 admission  Dx favorable for increased CO2 retention 2/2 COPD flare with weaning steroids +/- increased fluid retention or infectious etiology vs superimposed VTE in setting of recent hospitalization  - IV methylpred 40 mg q 12 hours  - budesonide nebs q 12 hours, standing albuterol-ipratropium q 4 hours  - montelukast  - follow up TTE  - will try to get patient qualified BiPAP for discharge  - empiric doxycycline   - follow up blood cx, sputum cx if expectorates, CT Chest to assess for hidden PNA not seen on CXR  - pulmonology following, appreciate recommendations    Encephalopathy  Assessment & Plan  Dx most favorable for 2/2 acute on chronic hypercapnea  - BiPAP 18/8    Lower extremity edema  Assessment & Plan  Patient with reported new onset LE swelling L> R  ddx 3rd spacing from low albumin vs DVT vs cardiac-related  - follow up TTE    Diabetes mellitus (CMS/[facility])  Assessment & Plan  1/7/2020 Hemoglobin A1c 7.8%   Likely to become more hyperglycemic on steroids  - CF with accuchecks for now  - would benefit from diabetes edu prior to discharge    Chronic kidney disease (CKD), stage III (moderate) (CMS/[facility])  Assessment & Plan  Cr around baseline  - trend with chemistries  - renally dose medications    GERD (gastroesophageal reflux disease)  Assessment & Plan  - PPI and H2  blocker    History of prostate cancer  Assessment & Plan  - finasteride, tamsulosin    Hyperlipidemia  Assessment & Plan  - atorvastatin    Hypertension  Assessment & Plan  - amlodipine, diltiazem and carvedilol with hold parameters       VTE Assessment: Padua    Code Status: Full Code  Estimated discharge date: 1/10/2020     ATTENDING DOCUMENTATION  ALSO SEE ATTENDING ATTESTATION SECTION OF NOTE     Increased work of breathing overnight this morning placed on BiPAP approximately 1 hour later doing slightly better however will most likely need BiPAP throughout the day.  Discussed the case with pulmonary we will back off on antibiotics given his history of dress syndrome with antibiotics.    Minimal air exchange while on BiPAP mild respiratory distress heart rate tachycardic lower extremities with mild edema JVP elevated however evaluated while on BiPAP 15/5    Acute hypercapnic respiratory failure continue with IV methylprednisolone, nebulizer treatments, BiPAP therapy for now.    Lower extremity edema unclear the exact etiology of obtaining transthoracic echocardiogram continue with PRN dosing of diuresis

## 2020-01-08 NOTE — PLAN OF CARE
Problem: Adult Inpatient Plan of Care  Goal: Plan of Care Review  Flowsheets (Taken 1/8/2020 5698)  Progress: improving  Plan of Care Reviewed With: patient  Outcome Summary: CM was unable to awaken patient, had on a bipap. Tried to assess x2, will reattempt in the AM.

## 2020-01-09 ENCOUNTER — APPOINTMENT (INPATIENT)
Dept: RADIOLOGY | Facility: HOSPITAL | Age: 85
DRG: 190 | End: 2020-01-09
Attending: STUDENT IN AN ORGANIZED HEALTH CARE EDUCATION/TRAINING PROGRAM
Payer: COMMERCIAL

## 2020-01-09 PROBLEM — N17.9 ACUTE KIDNEY INJURY SUPERIMPOSED ON CHRONIC KIDNEY DISEASE (CMS/HCC)  (CMS/HCC): Status: ACTIVE | Noted: 2019-04-11

## 2020-01-09 PROBLEM — N18.9 ACUTE KIDNEY INJURY SUPERIMPOSED ON CHRONIC KIDNEY DISEASE (CMS/HCC)  (CMS/HCC): Status: ACTIVE | Noted: 2019-04-11

## 2020-01-09 PROBLEM — R60.0 LOWER EXTREMITY EDEMA: Status: RESOLVED | Noted: 2020-01-07 | Resolved: 2020-01-09

## 2020-01-09 LAB
ANION GAP SERPL CALC-SCNC: 12 MEQ/L (ref 3–15)
ANION GAP SERPL CALC-SCNC: 9 MEQ/L (ref 3–15)
BASOPHILS # BLD: 0.06 K/UL (ref 0.01–0.1)
BASOPHILS NFR BLD: 0.3 %
BUN SERPL-MCNC: 42 MG/DL (ref 8–20)
BUN SERPL-MCNC: 44 MG/DL (ref 8–20)
CALCIUM SERPL-MCNC: 8.3 MG/DL (ref 8.9–10.3)
CALCIUM SERPL-MCNC: 8.3 MG/DL (ref 8.9–10.3)
CHLORIDE SERPL-SCNC: 102 MEQ/L (ref 98–109)
CHLORIDE SERPL-SCNC: 99 MEQ/L (ref 98–109)
CO2 SERPL-SCNC: 21 MEQ/L (ref 22–32)
CO2 SERPL-SCNC: 29 MEQ/L (ref 22–32)
CREAT SERPL-MCNC: 1.4 MG/DL
CREAT SERPL-MCNC: 1.4 MG/DL
DIFFERENTIAL METHOD BLD: ABNORMAL
EOSINOPHIL # BLD: 0 K/UL (ref 0.04–0.54)
EOSINOPHIL NFR BLD: 0 %
ERYTHROCYTE [DISTWIDTH] IN BLOOD BY AUTOMATED COUNT: 13.4 % (ref 11.6–14.4)
GFR SERPL CREATININE-BSD FRML MDRD: 48.5 ML/MIN/1.73M*2
GFR SERPL CREATININE-BSD FRML MDRD: 48.5 ML/MIN/1.73M*2
GLUCOSE BLD-MCNC: 251 MG/DL (ref 70–99)
GLUCOSE BLD-MCNC: 257 MG/DL (ref 70–99)
GLUCOSE BLD-MCNC: 265 MG/DL (ref 70–99)
GLUCOSE BLD-MCNC: 283 MG/DL (ref 70–99)
GLUCOSE SERPL-MCNC: 272 MG/DL (ref 70–99)
GLUCOSE SERPL-MCNC: 276 MG/DL (ref 70–99)
HCT VFR BLDCO AUTO: 48.1 % (ref 40.1–51)
HGB BLD-MCNC: 15.7 G/DL
IMM GRANULOCYTES # BLD AUTO: 0.46 K/UL (ref 0–0.08)
IMM GRANULOCYTES NFR BLD AUTO: 2.2 %
LYMPHOCYTES # BLD: 0.36 K/UL (ref 1.2–3.5)
LYMPHOCYTES NFR BLD: 1.7 %
MAGNESIUM SERPL-MCNC: 2.3 MG/DL (ref 1.8–2.5)
MCH RBC QN AUTO: 31.8 PG (ref 28–33.2)
MCHC RBC AUTO-ENTMCNC: 32.6 G/DL (ref 32.2–36.5)
MCV RBC AUTO: 97.4 FL (ref 83–98)
MONOCYTES # BLD: 0.53 K/UL (ref 0.3–1)
MONOCYTES NFR BLD: 2.5 %
NEUTROPHILS # BLD: 19.82 K/UL (ref 1.7–7)
NEUTS SEG NFR BLD: 93.3 %
NRBC BLD-RTO: 0 %
PDW BLD AUTO: 10.1 FL (ref 9.4–12.4)
PLATELET # BLD AUTO: 147 K/UL
POCT TEST: ABNORMAL
POTASSIUM SERPL-SCNC: 4.9 MEQ/L (ref 3.6–5.1)
POTASSIUM SERPL-SCNC: 6.2 MEQ/L (ref 3.6–5.1)
RBC # BLD AUTO: 4.94 M/UL (ref 4.5–5.8)
SODIUM SERPL-SCNC: 135 MEQ/L (ref 136–144)
SODIUM SERPL-SCNC: 137 MEQ/L (ref 136–144)
WBC # BLD AUTO: 21.23 K/UL

## 2020-01-09 PROCEDURE — 36415 COLL VENOUS BLD VENIPUNCTURE: CPT | Performed by: STUDENT IN AN ORGANIZED HEALTH CARE EDUCATION/TRAINING PROGRAM

## 2020-01-09 PROCEDURE — 71250 CT THORAX DX C-: CPT

## 2020-01-09 PROCEDURE — 21400000 HC ROOM AND CARE CCU/INTERMEDIATE

## 2020-01-09 PROCEDURE — 94660 CPAP INITIATION&MGMT: CPT

## 2020-01-09 PROCEDURE — 83735 ASSAY OF MAGNESIUM: CPT | Performed by: STUDENT IN AN ORGANIZED HEALTH CARE EDUCATION/TRAINING PROGRAM

## 2020-01-09 PROCEDURE — 97162 PT EVAL MOD COMPLEX 30 MIN: CPT

## 2020-01-09 PROCEDURE — 63600000 HC DRUGS/DETAIL CODE: Performed by: STUDENT IN AN ORGANIZED HEALTH CARE EDUCATION/TRAINING PROGRAM

## 2020-01-09 PROCEDURE — 71045 X-RAY EXAM CHEST 1 VIEW: CPT

## 2020-01-09 PROCEDURE — 99233 SBSQ HOSP IP/OBS HIGH 50: CPT | Performed by: INTERNAL MEDICINE

## 2020-01-09 PROCEDURE — 63700000 HC SELF-ADMINISTRABLE DRUG: Performed by: STUDENT IN AN ORGANIZED HEALTH CARE EDUCATION/TRAINING PROGRAM

## 2020-01-09 PROCEDURE — 85025 COMPLETE CBC W/AUTO DIFF WBC: CPT | Performed by: STUDENT IN AN ORGANIZED HEALTH CARE EDUCATION/TRAINING PROGRAM

## 2020-01-09 PROCEDURE — 80048 BASIC METABOLIC PNL TOTAL CA: CPT | Performed by: STUDENT IN AN ORGANIZED HEALTH CARE EDUCATION/TRAINING PROGRAM

## 2020-01-09 PROCEDURE — 97166 OT EVAL MOD COMPLEX 45 MIN: CPT

## 2020-01-09 PROCEDURE — 25000000 HC PHARMACY GENERAL: Performed by: STUDENT IN AN ORGANIZED HEALTH CARE EDUCATION/TRAINING PROGRAM

## 2020-01-09 RX ORDER — INSULIN ASPART 100 [IU]/ML
3-5 INJECTION, SOLUTION INTRAVENOUS; SUBCUTANEOUS
Status: DISCONTINUED | OUTPATIENT
Start: 2020-01-09 | End: 2020-01-10

## 2020-01-09 RX ORDER — INSULIN ASPART 100 [IU]/ML
4 INJECTION, SOLUTION INTRAVENOUS; SUBCUTANEOUS ONCE
Status: COMPLETED | OUTPATIENT
Start: 2020-01-09 | End: 2020-01-09

## 2020-01-09 RX ADMIN — AMLODIPINE BESYLATE 5 MG: 5 TABLET ORAL at 08:10

## 2020-01-09 RX ADMIN — DILTIAZEM HYDROCHLORIDE 120 MG: 120 CAPSULE, COATED, EXTENDED RELEASE ORAL at 08:11

## 2020-01-09 RX ADMIN — TAMSULOSIN HYDROCHLORIDE 0.4 MG: 0.4 CAPSULE ORAL at 22:06

## 2020-01-09 RX ADMIN — CARVEDILOL 3.12 MG: 3.12 TABLET, FILM COATED ORAL at 17:31

## 2020-01-09 RX ADMIN — BUDESONIDE 0.5 MG: 0.5 INHALANT ORAL at 17:31

## 2020-01-09 RX ADMIN — INSULIN ASPART 3 UNITS: 100 INJECTION, SOLUTION INTRAVENOUS; SUBCUTANEOUS at 08:43

## 2020-01-09 RX ADMIN — PANTOPRAZOLE SODIUM 20 MG: 20 TABLET, DELAYED RELEASE ORAL at 08:10

## 2020-01-09 RX ADMIN — ENOXAPARIN SODIUM 40 MG: 40 INJECTION, SOLUTION INTRAVENOUS; SUBCUTANEOUS at 17:31

## 2020-01-09 RX ADMIN — CARVEDILOL 3.12 MG: 3.12 TABLET, FILM COATED ORAL at 08:10

## 2020-01-09 RX ADMIN — FINASTERIDE 5 MG: 5 TABLET, FILM COATED ORAL at 08:11

## 2020-01-09 RX ADMIN — INSULIN ASPART 3 UNITS: 100 INJECTION, SOLUTION INTRAVENOUS; SUBCUTANEOUS at 17:30

## 2020-01-09 RX ADMIN — GUAIFENESIN 600 MG: 600 TABLET ORAL at 20:19

## 2020-01-09 RX ADMIN — CETIRIZINE HYDROCHLORIDE 10 MG: 10 TABLET, FILM COATED ORAL at 22:06

## 2020-01-09 RX ADMIN — FAMOTIDINE 40 MG: 20 TABLET, FILM COATED ORAL at 08:09

## 2020-01-09 RX ADMIN — INSULIN ASPART 4 UNITS: 100 INJECTION, SOLUTION INTRAVENOUS; SUBCUTANEOUS at 22:04

## 2020-01-09 RX ADMIN — IPRATROPIUM BROMIDE AND ALBUTEROL SULFATE 3 ML: 2.5; .5 SOLUTION RESPIRATORY (INHALATION) at 13:14

## 2020-01-09 RX ADMIN — IPRATROPIUM BROMIDE AND ALBUTEROL SULFATE 3 ML: 2.5; .5 SOLUTION RESPIRATORY (INHALATION) at 22:06

## 2020-01-09 RX ADMIN — IPRATROPIUM BROMIDE AND ALBUTEROL SULFATE 3 ML: 2.5; .5 SOLUTION RESPIRATORY (INHALATION) at 01:42

## 2020-01-09 RX ADMIN — METHYLPREDNISOLONE SODIUM SUCCINATE 40 MG: 40 INJECTION, POWDER, FOR SOLUTION INTRAMUSCULAR; INTRAVENOUS at 05:35

## 2020-01-09 RX ADMIN — ESCITALOPRAM OXALATE 10 MG: 10 TABLET, FILM COATED ORAL at 08:11

## 2020-01-09 RX ADMIN — ATORVASTATIN CALCIUM 10 MG: 10 TABLET, FILM COATED ORAL at 08:11

## 2020-01-09 RX ADMIN — INSULIN ASPART 3 UNITS: 100 INJECTION, SOLUTION INTRAVENOUS; SUBCUTANEOUS at 13:15

## 2020-01-09 RX ADMIN — IPRATROPIUM BROMIDE AND ALBUTEROL SULFATE 3 ML: 2.5; .5 SOLUTION RESPIRATORY (INHALATION) at 05:36

## 2020-01-09 RX ADMIN — GUAIFENESIN 600 MG: 600 TABLET ORAL at 08:11

## 2020-01-09 RX ADMIN — METHYLPREDNISOLONE SODIUM SUCCINATE 40 MG: 40 INJECTION, POWDER, FOR SOLUTION INTRAMUSCULAR; INTRAVENOUS at 17:31

## 2020-01-09 RX ADMIN — DOXYCYCLINE HYCLATE 100 MG: 100 TABLET, FILM COATED ORAL at 08:10

## 2020-01-09 RX ADMIN — IPRATROPIUM BROMIDE AND ALBUTEROL SULFATE 3 ML: 2.5; .5 SOLUTION RESPIRATORY (INHALATION) at 17:31

## 2020-01-09 RX ADMIN — DOXYCYCLINE HYCLATE 100 MG: 100 TABLET, FILM COATED ORAL at 20:19

## 2020-01-09 RX ADMIN — IPRATROPIUM BROMIDE AND ALBUTEROL SULFATE 3 ML: 2.5; .5 SOLUTION RESPIRATORY (INHALATION) at 10:21

## 2020-01-09 RX ADMIN — BUDESONIDE 0.5 MG: 0.5 INHALANT ORAL at 05:35

## 2020-01-09 RX ADMIN — MONTELUKAST 10 MG: 10 TABLET, FILM COATED ORAL at 22:05

## 2020-01-09 ASSESSMENT — COGNITIVE AND FUNCTIONAL STATUS - GENERAL
MOVING TO AND FROM BED TO CHAIR: 3 - A LITTLE
CLIMB 3 TO 5 STEPS WITH RAILING: 3 - A LITTLE
AFFECT: WFL
TOILETING: 4 - NONE
DRESSING REGULAR LOWER BODY CLOTHING: 3 - A LITTLE
DRESSING REGULAR UPPER BODY CLOTHING: 3 - A LITTLE
HELP NEEDED FOR PERSONAL GROOMING: 4 - NONE
HELP NEEDED FOR BATHING: 3 - A LITTLE
STANDING UP FROM CHAIR USING ARMS: 3 - A LITTLE
EATING MEALS: 4 - NONE
WALKING IN HOSPITAL ROOM: 3 - A LITTLE

## 2020-01-09 NOTE — PLAN OF CARE
Problem: Glycemic Control Impaired  Goal: Blood Glucose Level Within Desired Range  Outcome: Progressing   Spoke with patient and his wife introducing the A1C value  of 7.8% and indication of T2D. They are quite used to elevated BG with 15 hospitalization in past three years inclusive of steroids. Patient's father with T2D in older age.     We discussed the impact of CHO on BG. We read food labels in the [employer] carb guide and examined lists and portion sizes. Patient enjoys pasta and may continue to eat it in appropriate portions. They verbalized understanding. 60 -75 grams of CHO each meal suggested; it would be ideal to f/u with the outpatient RD, CDE for an individualized meal plan as desired. Plate method of eating also reviewed and shard in a handout. Family has a good understanding not to 'go crazy' with liberal CHO such as excessive milk. Patient agrees to replace orange juice with a fresh orange at breakfast.     Goals for control discussed as 90 - 140 mg/dL. A1C may be acceptable as is considering his age and treatments with steroids. I offered a meter for checking BG out of curiosity; patient is not sure he wants to look at the numbers but maybe just make some dietary changes and f/u with PCP for a repeat A1C. They were very appreciative of education. I will return for f/u Q & A tomorrow. Their daughter Amelia is an RN and is often present.

## 2020-01-09 NOTE — PLAN OF CARE
"  Problem: Adult Inpatient Plan of Care  Goal: Plan of Care Review  Flowsheets  Taken 1/9/2020 1615 by Leona Rincon LSW  Progress: improving  Plan of Care Reviewed With: patient  Taken 1/9/2020 1602 by Dhruv Manley RN  Outcome Summary: Discharge planned for Saturday vs Sunday. Patient will need a Trilogy unit at discharge, referral placed with [facility]. [facility] notified of dispo plan.     Problem: Adult Inpatient Plan of Care  Goal: Readiness for Transition of Care  Intervention: Mutually Develop Transition Plan  Flowsheets (Taken 1/9/2020 1616)  Anticipated Discharge Disposition: home with home health services  Equipment Needed After Discharge: none  Equipment Currently Used at Home: walker, front-wheeled; wheelchair; nebulizer; oxygen; other (see comments) (oxygen and nebulizer are through [facility].)  Readmission Within the Last 30 Days: previous discharge plan unsuccessful  Patient/Family Anticipated Services at Transition: home health care  Patient/Family Anticipates Transition to: home with help/services; home with family  Transportation Anticipated: family or friend will provide  Concerns to be Addressed: care coordination/care conferences; discharge planning  Patient's Choice of Community Agency(s): Patient connected with [facility] SN/PT/OT and would like to continue with them.  Offered/Gave Vendor List: yes    SW met with patient at bedside: he lives in a 2SH with 1STE and 13 steps to his B/B. [facility].  Verified pharmacy and PCP. Patient's family/friend can provide a ride home.      READMIT: Patient stated that he felt ready to leave the hospital when he was discharged home. He explained that \"my feet swelled and I started talking goofy so my wife and sister called Dr. Brewster who told us to come back to the hospital.\" Patient stated that he was able to obtain his medication and meet with the visiting nurse after his discharge.  He said that the nurse had told him " "he was doing \"great\" and that he doesn't know what triggered his decline resulting in rehospitalization.  Patient stated that he does not believe that anything could be done differently to prevent this from occurring.   "

## 2020-01-09 NOTE — PLAN OF CARE
Problem: Adult Inpatient Plan of Care  Goal: Plan of Care Review  Flowsheets (Taken 1/9/2020 1602)  Progress: improving  Plan of Care Reviewed With: other (see comments) (Medical team)  Outcome Summary: Discharge planned for Saturday vs Sunday. Patient will need a Trilogy unit at discharge, referral placed with [facility]. [facility] notified of dispo plan.  Will continue to follow for transition of care needs until the discharge is complete.

## 2020-01-09 NOTE — PROGRESS NOTES
Patient: Zuhair Luong Jr  Location: [place] [address]  MRN: [id number]  Today's date: 1/9/2020    Ed is a 84 y.o. male admitted on 1/7/2020 with Shortness of breath. Principal problem is No Principal Problem: There is no principal problem currently on the Problem List. Please update the Problem List and refresh..    Past Medical History  Ed has a past medical history of Chronic kidney disease, COPD (chronic obstructive pulmonary disease) (CMS/[facility]), Diverticulitis of colon, Emphysema lung (CMS/HCC), Hypertension, and Prostate cancer (CMS/[facility]).    History of Present Illness     Concluded session with pt supine in bed with RN remote in hand.     PT Vitals    Date/Time Pulse SpO2 Pt Activity O2 Therapy O2 Del Method O2 Flow Rate BP BP Location BP Method Pt Position [facility]   01/09/20 1125 95 96 % At rest Supplemental oxygen Nasal cannula 4 L/min 147/70 -- -- -- LINDA   01/09/20 1127 98 94 % Walking Supplemental oxygen Nasal cannula 4 L/min -- -- -- -- LINDA   01/09/20 1127 -- -- -- -- -- -- 140/87 Right upper arm Automatic Lying YB      PT Pain    Date/Time Pain Type Pref Pain Scale Rating: Rest Rating: Activity [facility]   01/09/20 1125 Pain Assessment number (Numeric Rating Pain Scale) 0 0 LINDA          Prior Living Environment      Most Recent Value   Living Arrangements  house   Living Environment Comment  lives with spouse, dtr and son nearby   Number of Stairs, Main Entrance  1   Stair Railings, Main Entrance  none   Stairs Comment, Main Entrance  -- [first floor setup]          Prior Level of Function      Most Recent Value   Dominant Hand  right   Ambulation  independent   Transferring  independent   Toileting  independent   Bathing  independent   Dressing  independent   Eating  independent   Communication  understands/communicates without difficulty   Swallowing  swallows foods/liquids without difficulty   Equipment Currently Used at Home  wheelchair          PT Evaluation and Treatment - 01/09/20  1133        Time Calculation    Start Time  1114     Stop Time  1129     Time Calculation (min)  15 min        Session Details    Document Type  initial evaluation     Mode of Treatment  individual therapy;physical therapy        General Information    Patient Profile Reviewed?  yes     Onset of Illness/Injury or Date of Surgery  01/07/20     Referring Physician  derrek     Existing Precautions/Restrictions  fall;oxygen therapy device and L/min    4L       Range of Motion (ROM)    Range of Motion  ROM is WFL        Strength (Manual Muscle Testing)    Strength (Manual Muscle Testing)  --    nt       Bed Mobility    [place], Supine to Sit  supervision        Bed to Chair Transfer    [place], Bed to Chair  not tested        Sit to Stand Transfer    [place], Sit to Stand Transfer  close supervision        Stand to Sit Transfer    [place], Stand to Sit Transfer  close supervision        Gait Training    [place], Gait  minimum assist (75% or more patient effort)     Assistive Device  --    HHA    Distance in Feet  80 feet     Gait Pattern Utilized  step-through     Deviations/Abnormal Patterns (Gait)  gait speed decreased;step length decreased;stride length decreased        Stairs Training    [place], Stairs  not tested        AM-PAC (TM) - Mobility (Current Function)    Turning from your back to your side while in a flat bed without using bedrails?  4 - None     Moving from lying on your back to sitting on the side of a flat bed without using bedrails?  4 - None     Moving to and from a bed to a chair?  3 - A Little     Standing up from a chair using your arms?  3 - A Little     To walk in a hospital room?  3 - A Little     Climbing 3-5 steps with a railing?  3 - A Little     AM-PAC (TM) Mobility Score  20        Therapy Assessment/Plan (PT)    Patient/Family Therapy Goals Statement (PT)  return home     Rehab Potential (PT)  good, to achieve stated therapy goals     Therapy Frequency (PT)   3-5 times/wk     Problem List  balance;strength        Progress Summary (PT)    Daily Outcome Statement (PT)  Pt has been ambulating to and from BR s an AD, but is unsteady at times and at a high fall risk.  Will benefit from use of RW.  Pt refusing to go to SNF after hospital stay.  Wants to go directly home with PT / OT.         Therapy Plan Review/Discharge Plan (PT)    Therapy Plan Review (PT)  evaluation/treatment results reviewed;patient;daughter     Anticipated Equipment Needs at Discharge (PT Eval)  walker, front-wheeled     PT Recommended Discharge Disposition  home with home health                       Education provided this session. See the Patient Education summary report for full details.    PT Goals      Most Recent Value   Transfer Goal 1   Activity/Assistive Device  all transfers at 01/09/2020 1133   [place]  independent at 01/09/2020 1133   Time Frame  3 - 5 days at 01/09/2020 1133   Progress/Outcome  goal ongoing at 01/09/2020 1133   Gait Training Goal 1   Activity/Assistive Device  gait (walking locomotion) at 01/09/2020 1133   [place]  modified independence at 01/09/2020 1133   Distance  100 at 01/09/2020 1133   Time Frame  3 - 5 days at 01/09/2020 1133   Progress/Outcome  goal ongoing at 01/09/2020 1133

## 2020-01-09 NOTE — NURSING NOTE
Critical value of K 6.2 (hemolyzed). Spoke with Dr. Grey. Ordered STAT BMP for now. Will page IV team and continue to monitor.

## 2020-01-09 NOTE — PLAN OF CARE
Problem: Adult Inpatient Plan of Care  Goal: Plan of Care Review  Outcome: Progressing  Flowsheets (Taken 1/9/2020 2274)  Progress: improving  Plan of Care Reviewed With: patient; daughter  Outcome Summary: Pt on Bipap at , no complaints.

## 2020-01-09 NOTE — PATIENT CARE CONFERENCE
Care Progression Rounds Note  Date: 1/9/2020  Time: 12:13 PM     Patient Name: Zuhair Luong Jr     Medical Record Number: [id number]   YOB: 1935  Sex: Male      Room/Bed: 0148    Admitting Diagnosis: Shortness of breath [R06.02]  Acute delirium [R41.0]  COPD exacerbation (CMS/HCC) [J44.1]  Bilateral leg edema [R60.0]   Admit Date/Time: 1/7/2020  3:51 PM    Primary Diagnosis: No Principal Problem: There is no principal problem currently on the Problem List. Please update the Problem List and refresh.  Principal Problem: No Principal Problem: There is no principal problem currently on the Problem List. Please update the Problem List and refresh.    GMLOS: pending  Anticipated Discharge Date: 1/10/2020    AM-PAC  Mobility Score: 20    Discharge Planning:  Living Arrangements: house    Barriers to Discharge:  Barriers to Discharge: Medical issues not resolved, Test pending    Participants:  physical therapy, nursing, , physician, social work/services

## 2020-01-09 NOTE — PROGRESS NOTES
Pulmonology Daily Progress Note    Subjective/Objective:  Subjective     Interval History: none. He feels much better, back to normal mental status.  Slept all day yesterday with Bipap, also slept well overnight.  Daughter at bedside, said he was great last night and answering all of the questions on Jeopardy correctly    CT chest this morning with RUL infiltrate.  It is not visible on chest xray    Objective     Vital signs in last 24 hours:  Temp:  [36.4 °C (97.5 °F)-37 °C (98.6 °F)] 36.5 °C (97.7 °F)  Heart Rate:  [80-96] 95  Resp:  [18] 18  BP: (126-147)/(63-80) 147/70  FiO2 (%) (Set):  [40 %] 40 %    Oxygen:  Oxygen Therapy: Supplemental oxygen  O2 Delivery Method: CPAP nasal  FiO2 (%) (Set): 40 %  O2 Flow Rate (L/min): 4 L/min    I/O Brief:    Intake/Output Summary (Last 24 hours) at 1/9/2020 1017  Last data filed at 1/8/2020 1800  Gross per 24 hour   Intake --   Output 400 ml   Net -400 ml     I/O this shift:  No intake/output data recorded.    Labs:  Creat 1.2, WBC 17,000     Echo yesterday normal EF    Imaging:  I have reviewed the Imaging from the last 24 hrs.    VTE Assessment: I have reassessed and the patient's VTE risk and treatment plan is appropriate.    Physical Exam:  Awake and alert, on nasal oxygen  Chatty and appropriate  Lungs with scattered rhonchi, wet cough  RRR  1+ bilateral edema, he says better than yesterday    IMP:  Resolved acute hypercapnea and back to baseline mental status  RUL infiltrate - acuity not clear since it is not visible on current chest xray.  Not sure if this is new or if it is residual from recent infection    REC  Continue Doxycycline  Would not wean Solumedrol today, hopefully prednisone 40 mg tomorrow  Bipap with sleep and prn  Continue inhaled bronchodilators    Follow clinically re mental status and edema    Maria Luisa Albarran MD

## 2020-01-09 NOTE — HOSPITAL COURSE
Ed is a 84 y.o. male admitted on 1/7/2020 with Shortness of breath. Principal problem is No Principal Problem: There is no principal problem currently on the Problem List. Please update the Problem List and refresh..    Past Medical History  Ed has a past medical history of Chronic kidney disease, COPD (chronic obstructive pulmonary disease) (CMS/HCC), Diverticulitis of colon, Emphysema lung (CMS/HCC), Hypertension, and Prostate cancer (CMS/HCC).    History of Present Illness   DX acute on chronic respiratory failure w/ exac of COPD/ LE edema

## 2020-01-09 NOTE — NURSING NOTE
Pt resting quietly in bed. Repeat BMP showed K of 4.9. No complaints at this time. Will continue to monitor.

## 2020-01-09 NOTE — PLAN OF CARE
Problem: Adult Inpatient Plan of Care  Goal: Plan of Care Review  Outcome: Progressing  Flowsheets (Taken 1/9/2020 1142)  Plan of Care Reviewed With: patient  Outcome Summary: mobility limited by decreased endurance.

## 2020-01-09 NOTE — PROGRESS NOTES
Patient has a diagnosis of Chronic Respiratory Failure consequent to COPD and require non-invasive ventilation at home to treat the patients chronic disease.  Patient has an elevated blood gas of 73.    NIV is the best option to help manage the patients elevated pCO2.    Pap-V60 was started in the hospital and the patient shows improvement.  Standard BiPAP only has limited inspiratory and expiratory pressure settings without guaranteed tidal volumes.  The patient requires a more active form of therapy such as NiV.    This will result in better overall patient comfort and decrease the work of the patient's heart and lungs.    - Bella Grey PGY3 [id number]

## 2020-01-09 NOTE — SUBJECTIVE & OBJECTIVE
Subjective     Interval History: none. He feels much better, back to normal mental status.  Slept all day yesterday with Bipap, also slept well overnight.  Daughter at bedside, said he was great last night and answering all of the questions on Jeopardy correctly    CT chest this morning with RUL infiltrate.  It is not visible on chest xray    Objective     Vital signs in last 24 hours:  Temp:  [36.4 °C (97.5 °F)-37 °C (98.6 °F)] 36.5 °C (97.7 °F)  Heart Rate:  [80-96] 95  Resp:  [18] 18  BP: (126-147)/(63-80) 147/70  FiO2 (%) (Set):  [40 %] 40 %    Oxygen:  Oxygen Therapy: Supplemental oxygen  O2 Delivery Method: CPAP nasal  FiO2 (%) (Set): 40 %  O2 Flow Rate (L/min): 4 L/min    I/O Brief:    Intake/Output Summary (Last 24 hours) at 1/9/2020 1017  Last data filed at 1/8/2020 1800  Gross per 24 hour   Intake --   Output 400 ml   Net -400 ml     I/O this shift:  No intake/output data recorded.    Labs:  Creat 1.2, WBC 17,000     Echo yesterday normal EF    Imaging:  I have reviewed the Imaging from the last 24 hrs.    VTE Assessment: I have reassessed and the patient's VTE risk and treatment plan is appropriate.    Physical Exam:  Awake and alert, on nasal oxygen  Chatty and appropriate  Lungs with scattered rhonchi, wet cough  RRR  1+ bilateral edema, he says better than yesterday    IMP:  Resolved acute hypercapnea and back to baseline mental status  RUL infiltrate - acuity not clear since it is not visible on current chest xray.  Not sure if this is new or if it is residual from recent infection    REC  Continue Doxycycline  Would not wean Solumedrol today, hopefully prednisone 40 mg tomorrow  Bipap with sleep and prn  Continue inhaled bronchodilators    Follow clinically re mental status and edema    Maria Luisa Albarran MD

## 2020-01-09 NOTE — PROGRESS NOTES
"     Internal Medicine  Daily Progress Note       SUBJECTIVE   This is a 84 y.o. year-old male admitted on 2020 with Shortness of breath [R06.02]  Acute delirium [R41.0]  COPD exacerbation (CMS/HCC) [J44.1]  Bilateral leg edema [R60.0].    Interval History: patient endorsed markedly improved sx this AM.  Upon further interview, he stated he has never had a CPAP or BiPAP for breathing or sleeping.     OBJECTIVE      Vital signs in last 24 hours:  Temp:  [36.4 °C (97.5 °F)-37 °C (98.6 °F)] 36.6 °C (97.8 °F)  Heart Rate:  [80-98] 98  Resp:  [18] 18  BP: (126-147)/(63-87) 140/87  FiO2 (%) (Set):  [40 %] 40 %  Temp (24hrs), Av.6 °C (97.9 °F), Min:36.4 °C (97.5 °F), Max:37 °C (98.6 °F)        PHYSICAL EXAMINATION      PHYSICAL EXAM  Vitals: Visit Vitals  /87 (BP Location: Right upper arm, Patient Position: Lying)   Pulse 98   Temp 36.6 °C (97.8 °F) (Oral)   Resp 18   Ht 1.626 m (5' 4\")   Wt 75.8 kg (167 lb)   SpO2 94%   BMI 28.67 kg/m²      General: Non-toxic appearing   HEENT:  dry mucous membranes  No JVD  No cervical lymphadenopathy   Eyes: Conjunctiva clear, PERRLA   Cardiac: RRR  No murmurs, rubs, or gallops   Pulmonary: Bibasilar crackles, end-expiratory wheezes, improved air movement   Abdomen: Soft, non-tender, +BS  No rebound, no guarding       Extremities: B/L +1 pitting edema L > R   Neuro: AAO x 3, answers questions appropriately, speaks conversationally   Psych: Appropriate, cooperative   Skin: Dry, onymycosis                 LABS / IMAGING / TELE      Labs  Lab Results   Component Value Date    WBC 17.87 (H) 2020    HGB 14.9 2020    HCT 43.5 2020    MCV 93.5 2020     2020     Lab Results   Component Value Date    GLUCOSE 223 (H) 2020    CALCIUM 8.0 (L) 2020     2020    K 4.6 2020    CO2 29 2020    CL 98 2020    BUN 32 (H) 2020    CREATININE 1.2 2020     Lab Results   Component Value Date    ALT 34 " 01/07/2020    AST 18 01/07/2020     (H) 11/04/2016    ALKPHOS 66 01/07/2020    BILITOT 0.6 01/07/2020       Imaging  CT Chest 1/9/2020:   Interval development of an area of new somewhat 'clustered' patchy pulmonary  consolidative opacities in the right upper lobe.  Additional right upper lobe  new 8mm pulmonary nodular density.     ECG/Telemetry  Telemetry reviewed.     ASSESSMENT AND PLAN      Acute on chronic respiratory failure with hypoxia and hypercapnia (CMS/[facility])  Assessment & Plan  Patient presents with increased oxygen requirements and a pCO2 of 73 compared to pCO2 of 53 from 12/2019 admission  Dx favorable for increased CO2 retention 2/2 COPD flare with weaning steroids  or infectious etiology  - IV methylpred 40 mg q 12 hours, will likely transition to PO prednisone on 1/20  - budesonide nebs q 12 hours, standing albuterol-ipratropium q 4 hours  - montelukast  - will try to get patient qualified BiPAP for discharge  - empiric doxycycline   - pulmonology following, appreciate recommendations    Encephalopathy  Assessment & Plan  Dx most favorable for 2/2 acute on chronic hypercapnea  - BiPAP 18/8    Diabetes mellitus (CMS/[facility])  Assessment & Plan  1/7/2020 Hemoglobin A1c 7.8%   Likely to become more hyperglycemic on steroids  - CF with accuchecks for now  - would benefit from diabetes edu prior to discharge    Chronic kidney disease (CKD), stage III (moderate) (CMS/[facility])  Assessment & Plan  Cr around baseline  - trend with chemistries  - renally dose medications    GERD (gastroesophageal reflux disease)  Assessment & Plan  - PPI and H2 blocker    History of prostate cancer  Assessment & Plan  - finasteride, tamsulosin    Hyperlipidemia  Assessment & Plan  - atorvastatin    Hypertension  Assessment & Plan  - amlodipine, diltiazem and carvedilol with hold parameters       VTE Assessment: Padua    Code Status: Full Code  Estimated discharge date: 1/10/2020     ATTENDING DOCUMENTATION  ALSO SEE ATTENDING  ATTESTATION SECTION OF NOTE

## 2020-01-09 NOTE — PROGRESS NOTES
Patient: Zuhair Luong Jr  Location: [place] [address]  MRN: [id number]  Today's date: 1/9/2020    Ed is a 84 y.o. male admitted on 1/7/2020 with Shortness of breath. Principal problem is No Principal Problem: There is no principal problem currently on the Problem List. Please update the Problem List and refresh..    Past Medical History  Ed has a past medical history of Chronic kidney disease, COPD (chronic obstructive pulmonary disease) (CMS/[facility]), Diverticulitis of colon, Emphysema lung (CMS/[facility]), Hypertension, and Prostate cancer (CMS/[facility]).    History of Present Illness DX acute on chronic respiratory failure w/ exac of COPD/ LE edema              THERAPIST: Asiya Walters OTR/L/  Lola Walters OTR/L     REC: OT 3x         DISPO: [facility] 21; home w/ spouse support/ HC    DME:none this session     Patient left in bed, call bell in reach, - alarm activated,  - SCDs, + incontinence pad, + nurse aware.          OT Vitals    Date/Time Pulse HR Source SpO2 Pt Activity O2 Therapy O2 Del Method O2 Flow Rate BP BP Location BP Method Pt Position [facility]   01/09/20 1509 93 Monitor 97 % At rest Supplemental oxygen Nasal cannula 4 L/min 140/75 Right upper arm Automatic Lying [facility]   01/09/20 1526 94 Monitor 98 % Other (Comment) Supplemental oxygen Nasal cannula 4 L/min -- -- -- -- GDS      OT Pain    Date/Time Pref Pain Scale Rating: Rest Rating: Activity [facility]   01/09/20 1509 word (verbal rating pain scale) 0 - no pain 0 - no pain GDS          Prior Living Environment      Most Recent Value   Living Arrangements  house   Living Environment Comment  2SH, spouse, 1STE, 1st flr set up w/ tub combo/ shower chair avail,    Number of Stairs, Main Entrance  1   Stair Railings, Main Entrance  none   Stairs Comment, Main Entrance  -- [first floor setup]          Prior Level of Function      Most Recent Value   Dominant Hand  right   Ambulation  independent   Transferring  independent   Toileting  independent    Bathing  independent   Dressing  independent   Eating  independent   Communication  understands/communicates without difficulty   Swallowing  swallows foods/liquids without difficulty   Prior Level of Function Comment  PTA< ind ADL/ transfers,  2L Ox at home   Equipment Currently Used at Home  oxygen, wheelchair [w/c for distance,  has RW and cane avail but doesnt use ]          OT Evaluation and Treatment - 01/09/20 1509        Time Calculation    Start Time  1509     Stop Time  1526     Time Calculation (min)  17 min        Session Details    Document Type  initial evaluation     Mode of Treatment  occupational therapy        General Information    Patient Profile Reviewed?  yes     Onset of Illness/Injury or Date of Surgery  01/07/20     Referring Physician  Dr Louie     Existing Precautions/Restrictions  fall;oxygen therapy device and L/min;aspiration        Occupational Profile    Reason for Services/Referral (Occupational Profile)  s/p acute on chronic resp failure w/ exac of COPD/ LE edema        Cognition/Psychosocial    Affect/Mental Status (Cognitive)  WFL     Orientation Status (Cognition)  oriented x 4     Follows Commands (Cognition)  WFL        Sensory Assessment (Somatosensory)    Sensory Assessment (Somatosensory)  UE sensation intact        Range of Motion (ROM)    Range of Motion  ROM is WFL        Strength (Manual Muscle Testing)    Strength (Manual Muscle Testing)  strength is WFL        Bed Mobility    Bed Mobility  bed mobility (all) activities;supine to sit to supine     [place], Roll Left  supervision     [place], Roll Right  supervision     [place], Supine to Sit  supervision     [place], Sit to Supine  supervision     Assistive Device (Bed Mobility)  bed rails;head of bed elevated     Comment (Bed Mobility)  demos transition <>EOB w/ HOB elevated        Sit to Stand Transfer    [place], Sit to Stand Transfer  close supervision;verbal cues     Verbal Cues  hand  placement;preparatory posture     Assistive Device  walker, front-wheeled     Comment  has RW avail in rm/ states doesnt usually require at home/ no LOB noted this session        Stand to Sit Transfer    [place], Stand to Sit Transfer  close supervision     Assistive Device  walker, front-wheeled     Comment  princess good eccentric control        Balance    Balance Assessment  sitting static balance;sitting dynamic balance     Static Sitting Balance  WFL     Dynamic Sitting Balance  WFL        Basic Activities of Daily Living (BADLs)    Energy Conservation Techniques  activity adapted to utilize sitting position;ask for assistance with difficult tasks;avoid bending, reaching, stooping;items placed within easy reach;regular rest breaks    PLB tech       Bathing    Comment  not addressed this session        Upper Body Dressing    Comment  min to tie gown this session; discussed seated ADLs for increased support/promote increased endurance        Lower Body Dressing    [place]  supervision;verbal cues     Comment  doff/don socks in supported sitting in bed/ flexible LEs and able to utilize legs crossed to ease task effort; discussed supportive sitting and integration of paced rests and PLB to promote increased endurance w/ ADLs.         Grooming    Comment  IND        Toileting    Comment  urinal in reach at end of session        Self-Feeding    Comment  IND        AM-PAC (TM) - ADL (Current Function)    Putting on and taking off regular lower body clothing?  3 - A Little     Bathing?  3 - A Little     Toileting?  4 - None     Putting on/taking off regular upper body clothing?  3 - A Little     How much help for taking care of personal grooming?  4 - None     Eating meals?  4 - None     AM-PAC (TM) ADL Score  21        Therapy Assessment/Plan (OT)    Rehab Potential (OT)  good, to achieve stated therapy goals     Therapy Frequency (OT)  3 times/wk        Progress Summary (OT)    Daily Outcome Statement (OT)   ampac 21; demos CS transfers/ S>min ADls. Educ pacing/ EC/ PLB. REC Home w/ spouse support/ HC        Therapy Plan Review/Discharge Plan (OT)    Anticipated Equipment Needs At Discharge (OT)  none     OT Recommended Discharge Disposition  home;home with assist;home with home health                   Education provided this session. See the Patient Education summary report for full details.         OT Goals      Most Recent Value   Bed Mobility Goal 1   Activity/Assistive Device  bed mobility activities, all at 01/09/2020 1509   [place]  modified independence at 01/09/2020 1509   Time Frame  by discharge at 01/09/2020 1509   Progress/Outcome  goal ongoing at 01/09/2020 1509   Transfer Goal 1   Activity/Assistive Device  sit-to-stand/stand-to-sit, bed-to-chair/chair-to-bed, walker, front-wheeled at 01/09/2020 1509   [place]  modified independence at 01/09/2020 1509   Time Frame  by discharge at 01/09/2020 1509   Progress/Outcome  goal ongoing at 01/09/2020 1509   Transfer Goal 2   Activity/Assistive Device  toilet, walker, front-wheeled at 01/09/2020 1509   [place]  modified independence at 01/09/2020 1509   Time Frame  by discharge at 01/09/2020 1509   Progress/Outcome  goal ongoing at 01/09/2020 1509   Bathing Goal 1   Activity/Assistive Device  bathing skills, all at 01/09/2020 1509   [place]  modified independence at 01/09/2020 1509   Time Frame  by discharge at 01/09/2020 1509   Progress/Outcome  goal ongoing at 01/09/2020 1509   Dressing Goal 1   Activity/Adaptive Equipment  upper body dressing at 01/09/2020 1509   [place]  independent at 01/09/2020 1509   Time Frame  by discharge at 01/09/2020 1509   Progress/Outcome  goal ongoing at 01/09/2020 1509   Dressing Goal 2   Activity/Adaptive Equipment  lower body dressing at 01/09/2020 1509   [place]  modified independence at 01/09/2020 1509   Time Frame  by discharge at 01/09/2020 1509   Progress/Outcome  goal ongoing at 01/09/2020  1509   Toileting Goal 1   Activity/Assistive Device  toileting skills, all at 01/09/2020 1509   [place]  modified independence at 01/09/2020 1509   Time Frame  by discharge at 01/09/2020 1509   Progress/Outcome  goal ongoing at 01/09/2020 150

## 2020-01-09 NOTE — PLAN OF CARE
Problem: Adult Inpatient Plan of Care  Goal: Plan of Care Review  Outcome: Progressing  Flowsheets  Taken 1/9/2020 1128  Progress: improving  Outcome Summary: Pt on BiPAP q2hr and HS, says breathing feels better today  Taken 1/9/2020 0800  Plan of Care Reviewed With: patient;daughter     Problem: Adult Inpatient Plan of Care  Goal: Patient-Specific Goal (Individualization)  Outcome: Progressing  Flowsheets (Taken 1/9/2020 1128)  Patient-Specific Goals (Include Timeframe): to improve his breathing  Individualized Care Needs: supervision when ambulating, mini nebs  Anxieties, Fears or Concerns: swollen feet

## 2020-01-09 NOTE — PLAN OF CARE
Problem: Adult Inpatient Plan of Care  Goal: Plan of Care Review  Outcome: Progressing  Flowsheets (Taken 1/9/2020 5553)  Plan of Care Reviewed With: patient; spouse  Outcome Summary: OT ev completed/ CS transfers/ S>min ADls. Limited by decreased endurance. REC home w/ spouse support/ HC

## 2020-01-10 LAB
ANION GAP SERPL CALC-SCNC: 8 MEQ/L (ref 3–15)
BASOPHILS # BLD: 0.02 K/UL (ref 0.01–0.1)
BASOPHILS NFR BLD: 0.1 %
BUN SERPL-MCNC: 38 MG/DL (ref 8–20)
CALCIUM SERPL-MCNC: 8.2 MG/DL (ref 8.9–10.3)
CHLORIDE SERPL-SCNC: 97 MEQ/L (ref 98–109)
CO2 SERPL-SCNC: 28 MEQ/L (ref 22–32)
CREAT SERPL-MCNC: 1.2 MG/DL
DIFFERENTIAL METHOD BLD: ABNORMAL
EOSINOPHIL # BLD: 0 K/UL (ref 0.04–0.54)
EOSINOPHIL NFR BLD: 0 %
ERYTHROCYTE [DISTWIDTH] IN BLOOD BY AUTOMATED COUNT: 13.2 % (ref 11.6–14.4)
GFR SERPL CREATININE-BSD FRML MDRD: 58 ML/MIN/1.73M*2
GLUCOSE BLD-MCNC: 185 MG/DL (ref 70–99)
GLUCOSE BLD-MCNC: 224 MG/DL (ref 70–99)
GLUCOSE BLD-MCNC: 300 MG/DL (ref 70–99)
GLUCOSE BLD-MCNC: 342 MG/DL (ref 70–99)
GLUCOSE SERPL-MCNC: 363 MG/DL (ref 70–99)
HCT VFR BLDCO AUTO: 40.2 % (ref 40.1–51)
HGB BLD-MCNC: 13.3 G/DL
IMM GRANULOCYTES # BLD AUTO: 0.27 K/UL (ref 0–0.08)
IMM GRANULOCYTES NFR BLD AUTO: 1.5 %
LYMPHOCYTES # BLD: 0.28 K/UL (ref 1.2–3.5)
LYMPHOCYTES NFR BLD: 1.6 %
MAGNESIUM SERPL-MCNC: 2 MG/DL (ref 1.8–2.5)
MCH RBC QN AUTO: 31.6 PG (ref 28–33.2)
MCHC RBC AUTO-ENTMCNC: 33.1 G/DL (ref 32.2–36.5)
MCV RBC AUTO: 95.5 FL (ref 83–98)
MONOCYTES # BLD: 0.47 K/UL (ref 0.3–1)
MONOCYTES NFR BLD: 2.7 %
NEUTROPHILS # BLD: 16.56 K/UL (ref 1.7–7)
NEUTS SEG NFR BLD: 94.1 %
NRBC BLD-RTO: 0 %
PDW BLD AUTO: 10 FL (ref 9.4–12.4)
PLATELET # BLD AUTO: 152 K/UL
POCT TEST: ABNORMAL
POTASSIUM SERPL-SCNC: 4.7 MEQ/L (ref 3.6–5.1)
RBC # BLD AUTO: 4.21 M/UL (ref 4.5–5.8)
SODIUM SERPL-SCNC: 133 MEQ/L (ref 136–144)
WBC # BLD AUTO: 17.6 K/UL

## 2020-01-10 PROCEDURE — 94660 CPAP INITIATION&MGMT: CPT

## 2020-01-10 PROCEDURE — 63700000 HC SELF-ADMINISTRABLE DRUG: Performed by: STUDENT IN AN ORGANIZED HEALTH CARE EDUCATION/TRAINING PROGRAM

## 2020-01-10 PROCEDURE — 36415 COLL VENOUS BLD VENIPUNCTURE: CPT | Performed by: STUDENT IN AN ORGANIZED HEALTH CARE EDUCATION/TRAINING PROGRAM

## 2020-01-10 PROCEDURE — 25000000 HC PHARMACY GENERAL: Performed by: STUDENT IN AN ORGANIZED HEALTH CARE EDUCATION/TRAINING PROGRAM

## 2020-01-10 PROCEDURE — 80048 BASIC METABOLIC PNL TOTAL CA: CPT | Performed by: STUDENT IN AN ORGANIZED HEALTH CARE EDUCATION/TRAINING PROGRAM

## 2020-01-10 PROCEDURE — 21400000 HC ROOM AND CARE CCU/INTERMEDIATE

## 2020-01-10 PROCEDURE — 63600000 HC DRUGS/DETAIL CODE: Performed by: STUDENT IN AN ORGANIZED HEALTH CARE EDUCATION/TRAINING PROGRAM

## 2020-01-10 PROCEDURE — 83735 ASSAY OF MAGNESIUM: CPT | Performed by: STUDENT IN AN ORGANIZED HEALTH CARE EDUCATION/TRAINING PROGRAM

## 2020-01-10 PROCEDURE — 85025 COMPLETE CBC W/AUTO DIFF WBC: CPT | Performed by: STUDENT IN AN ORGANIZED HEALTH CARE EDUCATION/TRAINING PROGRAM

## 2020-01-10 PROCEDURE — 99233 SBSQ HOSP IP/OBS HIGH 50: CPT | Performed by: INTERNAL MEDICINE

## 2020-01-10 RX ORDER — INSULIN ASPART 100 [IU]/ML
3-5 INJECTION, SOLUTION INTRAVENOUS; SUBCUTANEOUS
Status: DISCONTINUED | OUTPATIENT
Start: 2020-01-10 | End: 2020-01-13 | Stop reason: HOSPADM

## 2020-01-10 RX ORDER — INSULIN ASPART 100 [IU]/ML
3 INJECTION, SOLUTION INTRAVENOUS; SUBCUTANEOUS ONCE
Status: COMPLETED | OUTPATIENT
Start: 2020-01-10 | End: 2020-01-10

## 2020-01-10 RX ORDER — INSULIN ASPART 100 [IU]/ML
3 INJECTION, SOLUTION INTRAVENOUS; SUBCUTANEOUS
Status: DISCONTINUED | OUTPATIENT
Start: 2020-01-10 | End: 2020-01-11

## 2020-01-10 RX ORDER — FUROSEMIDE 10 MG/ML
20 INJECTION INTRAMUSCULAR; INTRAVENOUS ONCE
Status: COMPLETED | OUTPATIENT
Start: 2020-01-10 | End: 2020-01-10

## 2020-01-10 RX ADMIN — DILTIAZEM HYDROCHLORIDE 120 MG: 120 CAPSULE, COATED, EXTENDED RELEASE ORAL at 08:17

## 2020-01-10 RX ADMIN — DOXYCYCLINE HYCLATE 100 MG: 100 TABLET, FILM COATED ORAL at 20:57

## 2020-01-10 RX ADMIN — ATORVASTATIN CALCIUM 10 MG: 10 TABLET, FILM COATED ORAL at 08:17

## 2020-01-10 RX ADMIN — CARVEDILOL 3.12 MG: 3.12 TABLET, FILM COATED ORAL at 17:29

## 2020-01-10 RX ADMIN — IPRATROPIUM BROMIDE AND ALBUTEROL SULFATE 3 ML: 2.5; .5 SOLUTION RESPIRATORY (INHALATION) at 02:01

## 2020-01-10 RX ADMIN — INSULIN ASPART 3 UNITS: 100 INJECTION, SOLUTION INTRAVENOUS; SUBCUTANEOUS at 08:43

## 2020-01-10 RX ADMIN — INSULIN ASPART 3 UNITS: 100 INJECTION, SOLUTION INTRAVENOUS; SUBCUTANEOUS at 12:46

## 2020-01-10 RX ADMIN — METHYLPREDNISOLONE SODIUM SUCCINATE 40 MG: 40 INJECTION, POWDER, FOR SOLUTION INTRAMUSCULAR; INTRAVENOUS at 20:57

## 2020-01-10 RX ADMIN — ENOXAPARIN SODIUM 40 MG: 40 INJECTION, SOLUTION INTRAVENOUS; SUBCUTANEOUS at 17:28

## 2020-01-10 RX ADMIN — ESCITALOPRAM OXALATE 10 MG: 10 TABLET, FILM COATED ORAL at 08:17

## 2020-01-10 RX ADMIN — GUAIFENESIN 600 MG: 600 TABLET ORAL at 08:17

## 2020-01-10 RX ADMIN — AMLODIPINE BESYLATE 5 MG: 5 TABLET ORAL at 08:16

## 2020-01-10 RX ADMIN — INSULIN ASPART 3 UNITS: 100 INJECTION, SOLUTION INTRAVENOUS; SUBCUTANEOUS at 17:26

## 2020-01-10 RX ADMIN — FAMOTIDINE 40 MG: 20 TABLET, FILM COATED ORAL at 08:15

## 2020-01-10 RX ADMIN — INSULIN ASPART 3 UNITS: 100 INJECTION, SOLUTION INTRAVENOUS; SUBCUTANEOUS at 21:45

## 2020-01-10 RX ADMIN — IPRATROPIUM BROMIDE AND ALBUTEROL SULFATE 3 ML: 2.5; .5 SOLUTION RESPIRATORY (INHALATION) at 14:09

## 2020-01-10 RX ADMIN — GUAIFENESIN 600 MG: 600 TABLET ORAL at 19:45

## 2020-01-10 RX ADMIN — INSULIN ASPART 5 UNITS: 100 INJECTION, SOLUTION INTRAVENOUS; SUBCUTANEOUS at 12:47

## 2020-01-10 RX ADMIN — INSULIN ASPART 3 UNITS: 100 INJECTION, SOLUTION INTRAVENOUS; SUBCUTANEOUS at 17:27

## 2020-01-10 RX ADMIN — METHYLPREDNISOLONE SODIUM SUCCINATE 40 MG: 40 INJECTION, POWDER, FOR SOLUTION INTRAMUSCULAR; INTRAVENOUS at 09:17

## 2020-01-10 RX ADMIN — PANTOPRAZOLE SODIUM 20 MG: 20 TABLET, DELAYED RELEASE ORAL at 08:17

## 2020-01-10 RX ADMIN — FINASTERIDE 5 MG: 5 TABLET, FILM COATED ORAL at 08:17

## 2020-01-10 RX ADMIN — IPRATROPIUM BROMIDE AND ALBUTEROL SULFATE 3 ML: 2.5; .5 SOLUTION RESPIRATORY (INHALATION) at 06:03

## 2020-01-10 RX ADMIN — IPRATROPIUM BROMIDE AND ALBUTEROL SULFATE 3 ML: 2.5; .5 SOLUTION RESPIRATORY (INHALATION) at 21:41

## 2020-01-10 RX ADMIN — CARVEDILOL 3.12 MG: 3.12 TABLET, FILM COATED ORAL at 08:17

## 2020-01-10 RX ADMIN — IPRATROPIUM BROMIDE AND ALBUTEROL SULFATE 3 ML: 2.5; .5 SOLUTION RESPIRATORY (INHALATION) at 09:23

## 2020-01-10 RX ADMIN — BUDESONIDE 0.5 MG: 0.5 INHALANT ORAL at 06:04

## 2020-01-10 RX ADMIN — DOXYCYCLINE HYCLATE 100 MG: 100 TABLET, FILM COATED ORAL at 08:16

## 2020-01-10 RX ADMIN — TAMSULOSIN HYDROCHLORIDE 0.4 MG: 0.4 CAPSULE ORAL at 21:42

## 2020-01-10 RX ADMIN — INSULIN ASPART 5 UNITS: 100 INJECTION, SOLUTION INTRAVENOUS; SUBCUTANEOUS at 21:44

## 2020-01-10 RX ADMIN — BUDESONIDE 0.5 MG: 0.5 INHALANT ORAL at 17:29

## 2020-01-10 RX ADMIN — FUROSEMIDE 20 MG: 10 INJECTION, SOLUTION INTRAMUSCULAR; INTRAVENOUS at 14:14

## 2020-01-10 RX ADMIN — IPRATROPIUM BROMIDE AND ALBUTEROL SULFATE 3 ML: 2.5; .5 SOLUTION RESPIRATORY (INHALATION) at 17:28

## 2020-01-10 RX ADMIN — CETIRIZINE HYDROCHLORIDE 10 MG: 10 TABLET, FILM COATED ORAL at 21:42

## 2020-01-10 RX ADMIN — MONTELUKAST 10 MG: 10 TABLET, FILM COATED ORAL at 21:42

## 2020-01-10 NOTE — PLAN OF CARE
Problem: Adult Inpatient Plan of Care  Goal: Plan of Care Review  Outcome: Progressing  Flowsheets  Taken 1/10/2020 1024  Progress: no change  Outcome Summary: Pt states he still feels SOB, receiving mini-nebs and IV solu-medrol, receiving accuchecks and insulin coverage with meals PRN  Taken 1/10/2020 0800  Plan of Care Reviewed With: patient     Problem: Adult Inpatient Plan of Care  Goal: Patient-Specific Goal (Individualization)  Outcome: Progressing  Flowsheets (Taken 1/10/2020 1024)  Patient-Specific Goals (Include Timeframe): to have his breathing return to baseline  Individualized Care Needs: supervision/assist x1, mini-nebs  Anxieties, Fears or Concerns: not being able to breath

## 2020-01-10 NOTE — PLAN OF CARE
Problem: Adult Inpatient Plan of Care  Goal: Plan of Care Review  Flowsheets (Taken 1/10/2020 0249)  Progress: improving  Plan of Care Reviewed With: patient  Outcome Summary: Pt verbalized understanding of plan of care; pt received mini-nebs as ordered; additional 4 units of insulin provided in the evening due to glucose level per MD  Goal: Patient-Specific Goal (Individualization)  Flowsheets (Taken 1/10/2020 0249)  Patient-Specific Goals (Include Timeframe): to be d/c w/out complication  Individualized Care Needs: Ax1  Anxieties, Fears or Concerns: COPD exac.

## 2020-01-10 NOTE — PROGRESS NOTES
"     Internal Medicine  Daily Progress Note       SUBJECTIVE   This is a 84 y.o. year-old male admitted on 2020 with Shortness of breath [R06.02]  Acute delirium [R41.0]  COPD exacerbation (CMS/HCC) [J44.1]  Bilateral leg edema [R60.0].    Interval History: patient stated that he felt like he was working to breath this AM, after walking back to the bed from the bathroom.  We discussed that plan for likely going home with a BiPAP machine, and the patient was agreeable.  He said he would be happy to close the loop with the diabetic educator in light of his DM dx, we discussed that his sugars were higher because of the steroids, and as this medication dose decreases, so will his hyperglycemia.     OBJECTIVE      Vital signs in last 24 hours:  Temp:  [36.3 °C (97.3 °F)-37.2 °C (98.9 °F)] 36.3 °C (97.3 °F)  Heart Rate:  [68-95] 75  Resp:  [18] 18  BP: (123-162)/(61-85) 123/61  FiO2 (%) (Set):  [40 %] 40 %  Temp (24hrs), Av.5 °C (97.7 °F), Min:36.3 °C (97.3 °F), Max:37.2 °C (98.9 °F)        PHYSICAL EXAMINATION      PHYSICAL EXAM  Vitals: Visit Vitals  /61 (BP Location: Right upper arm, Patient Position: Lying)   Pulse 75   Temp 36.3 °C (97.3 °F) (Oral)   Resp 18   Ht 1.626 m (5' 4\")   Wt 75.8 kg (167 lb)   SpO2 95%   BMI 28.67 kg/m²      General: Non-toxic appearing   HEENT:  dry mucous membranes  No JVD  No cervical lymphadenopathy   Eyes: Conjunctiva clear, PERRLA   Cardiac: RRR  No murmurs, rubs, or gallops   Pulmonary: Bibasilar crackles, end-expiratory wheezes, more bronchospastic today compared to yesterday   Abdomen: Soft, non-tender, +BS  No rebound, no guarding       Extremities: B/L +1 pitting edema L > R   Neuro: AAO x 3, answers questions appropriately, speaks conversationally   Psych: Appropriate, cooperative   Skin: Dry, onymycosis                 LABS / IMAGING / TELE      Labs  Lab Results   Component Value Date    WBC 21.23 (H) 2020    HGB 15.7 2020    HCT 48.1 2020    MCV " 97.4 01/09/2020     (L) 01/09/2020     Lab Results   Component Value Date    GLUCOSE 272 (H) 01/09/2020    CALCIUM 8.3 (L) 01/09/2020     01/09/2020    K 4.9 01/09/2020    CO2 29 01/09/2020    CL 99 01/09/2020    BUN 42 (H) 01/09/2020    CREATININE 1.4 (H) 01/09/2020     Lab Results   Component Value Date    ALT 34 01/07/2020    AST 18 01/07/2020     (H) 11/04/2016    ALKPHOS 66 01/07/2020    BILITOT 0.6 01/07/2020     1/10 labs yet to be drawn  Imaging  CT Chest 1/9/2020:   Interval development of an area of new somewhat 'clustered' patchy pulmonary  consolidative opacities in the right upper lobe.  Additional right upper lobe  new 8mm pulmonary nodular density.     ECG/Telemetry  Telemetry reviewed.     ASSESSMENT AND PLAN      Acute on chronic respiratory failure with hypoxia and hypercapnia (CMS/HCC)  Assessment & Plan  Patient presents with increased oxygen requirements and a pCO2 of 73 compared to pCO2 of 53 from 12/2019 admission  Dx favorable for increased CO2 retention 2/2 COPD flare with weaning steroids  or infectious etiology  - will keep on IV methylpred 40 q 12 hours, as patient more bronchospastic  - probable trial of IV furosemide 20 mg x 1 pending today's lab draw  - budesonide nebs q 12 hours, standing albuterol-ipratropium q 4 hours  - montelukast  - will try to get patient qualified BiPAP for discharge  - empiric doxycycline for 5 day course  - pulmonology following, appreciate recommendations    Encephalopathy  Assessment & Plan  Dx most favorable for 2/2 acute on chronic hypercapnea, as resolved after BiPAP use  - plan to go home with BiPAP 18/8    Diabetes mellitus (CMS/HCC)  Assessment & Plan  1/7/2020 Hemoglobin A1c 7.8%   Likely to become more hyperglycemic on steroids, seen by DM edu  - standing insulin aspart 3 units TID with meals  - CF with accuchecks   - will potential discharge on metformin pending clinical status    Acute kidney injury superimposed on chronic  kidney disease (CMS/HCC)  Assessment & Plan  Cr 1.4 on 1/ 9 from baseline of 1.1  Dx most favorable for pre-renal JAYME 2/2 poor PO intake vs 2/2 fluid retention  - encourage PO intake  - potential IV furosemide 20 mg x 1 pending 1/10 lab results  - trend with chemistries  - renally dose medications    GERD (gastroesophageal reflux disease)  Assessment & Plan  - PPI and H2 blocker    History of prostate cancer  Assessment & Plan  - finasteride, tamsulosin    Hyperlipidemia  Assessment & Plan  - atorvastatin    Hypertension  Assessment & Plan  - amlodipine, diltiazem and carvedilol with hold parameters       VTE Assessment: Padua    Code Status: Full Code  Estimated discharge date: 1/10/2020     ATTENDING DOCUMENTATION  ALSO SEE ATTENDING ATTESTATION SECTION OF NOTE

## 2020-01-10 NOTE — PROGRESS NOTES
Patient: Zuhair Luong   Location: [place] [address]  MRN: [id number]  Today's date: 1/10/2020    Attempted to see patient for therapy. Unable due to patient refused.  Pt politely decline therapy session at this time due to recuperating from trip to bathroom earlier.

## 2020-01-10 NOTE — NURSING NOTE
Pt assessed.  HR 94.  AAOx3.  Pt does not report of any pain.  Pt is on 4 L NC.  Evening medications administered.  Bed in lowest position, wheels locked, call bell within reach.  Will continue to monitor.

## 2020-01-10 NOTE — CONSULTS
One Touch Verio meter given to wife this evening. She did not have time for a demo but will work with her daughter to learn the meter (Amelia, an RN). Home care to follow and assist.     I did not teach insulin administration as metformin was suggested as discharge. Family is in agreement to keep carbohydrate intake modest to prevent hyperglycemia. The patient did not even want a meter but his wife was accepting of one to have on hand.

## 2020-01-10 NOTE — PROGRESS NOTES
[facility]: Referral received, chart reviewed. Patient known to [facility], currently receiving SN/PT/OT services . HC referral completed for SN/PT/OT services with [facility], with RACHEL 1/11/20.  Anne Marie Johnson RN [facility] [id number]

## 2020-01-10 NOTE — PROGRESS NOTES
" Pulmonary Progress Note       SUBJECTIVE   No acute events overnight. He is doing well this morning. He became more dyspneic after using the bathroom.      OBJECTIVE        Vital Signs:   Visit Vitals  BP (!) 162/64 (BP Location: Right upper arm, Patient Position: Lying)   Pulse 90   Temp 36.3 °C (97.3 °F) (Oral)   Resp 18   Ht 1.626 m (5' 4\")   Wt 75.8 kg (167 lb)   SpO2 95%   BMI 28.67 kg/m²       I/O's:  No intake or output data in the 24 hours ending 01/10/20 1019    Medications:    Reviewed. Pertinent medications as below.    • amLODIPine  5 mg oral Daily   • atorvastatin  10 mg oral Daily   • budesonide  0.5 mg nebulization BID (6a, 6p)   • carvedilol  3.125 mg oral BID with meals   • cetirizine  10 mg oral Nightly   • dilTIAZem CD  120 mg oral Daily   • doxycycline hyclate  100 mg oral q12h ASAEL   • enoxaparin  40 mg subcutaneous Daily (6p)   • escitalopram  10 mg oral Daily   • famotidine  40 mg oral Daily   • finasteride  5 mg oral Daily   • guaiFENesin  600 mg oral BID   • insulin aspart U-100  3-5 Units subcutaneous TID with meals   • ipratropium-albuterol  3 mL nebulization q4h ASAEL   • methylPREDNISolone sodium succinate  40 mg intravenous q12h INT   • montelukast  10 mg oral Nightly   • pantoprazole  20 mg oral Daily   • tamsulosin  0.4 mg oral Nightly       Anti-infectives (From admission, onward)    Start     Dose/Rate Route Frequency Ordered Stop    01/07/20 2125  doxycycline hyclate (VIBRA-TABS) tablet 100 mg      100 mg oral Every 12 hours 01/07/20 2121            PHYSICAL EXAMINATION        General: The patient is in no acute distress.  Resting comfortably in bed.   HEENT: Mucous membranes are moist.  Sclera are anicteric.  Neck: Supple.  No cervical lymphadenopathy  Cardiovascular: S1 and S2 are present.  There are no murmurs.  Lungs: Decreased breath sounds, end expiratory wheezes   Abdomen: Soft, nontender and nondistended  Extremities: Cool and dry with bilateral L>R pitting edema   Neuro: " Awake and alert.  Spontaneously moving all extremities      Diagnostic Data      Labs:    I have personally reviewed all pertinent patient laboratory results. Labs of note discussed below:    ABG Results       01/08/20 01/07/20 12/23/19                    0842 1813 1154         pH 7.40 7.29 --         pCO2 56 73 --         pO2 151 28 --         HCO3 34.7 35.1 --         Base Excess 7.8 5.4 --         Source Of Oxygen BIPAP nc bipap                     CMP Results       01/09/20 01/09/20 01/08/20                    1354 1137 0844          135 137         K 4.9 6.2 4.6         Cl 99 102 98         CO2 29 21 29         Glucose 272 276 223         BUN 42 44 32         Creatinine 1.4 1.4 1.2         Calcium 8.3 8.3 8.0         Anion Gap 9 12 10         EGFR 48.5 48.5 58.0         Comment for K at 1137 on 01/09/20:    MODERATE HEMOLYSIS, RESULT MAY BE INCREASED.        CBC Results       01/09/20 01/08/20 01/07/20                    1137 0844 1442         WBC 21.23 17.87 19.81         RBC 4.94 4.65 4.82         HGB 15.7 14.9 15.5         HCT 48.1 43.5 45.8         MCV 97.4 93.5 95.0         MCH 31.8 32.0 32.2         MCHC 32.6 34.3 33.8          180 210         Comment for PLT at 1137 on 01/09/20:  PLT CLUMPING SUSPECTED. PLT COUNT MAY BE SLIGHTLY HIGHER THAN REPORTED. IF CLINICALLY WARRANTED, SUGGEST COLLECTING SODIUM CITRATE TUBE (BLUE TOP) IN ADDITION TO EDTA TUBE FOR FOLLOW UP CBC TESTING.            maging:    I have reviewed all pertinent imaging which is discussed below.    No new imaging      ASSESSMENT AND PLAN      This is an 84 year old male with PMHx of COPD/emphysema with frequent exacerations, HTN who presented with shortness of breath and delirium.      1. COPD exacerbation with RUL infiltrate- Recently admitted for COPD exacerbation that was very slow to recover. He was discharged 2 days prior to admission on 2LNC on a prednisone taper and was doing well. The night prior to admission, he was  noted to have increased shortness of breath and productive cough with yellow sputum. He had started his taper and was on 30mg of prednisone daily at this point. His chest x-ray is clear without infiltrates, edema, or effusions. He has wheezing primarily in his upper lobes with rales in bilateral lower lobes.   - Evidence of hypercapnia on VBG on admission. Placed on BiLevel PAP     2. Lower extremity edema - New onset bilateral L>R lower extremity edema which may be related to steroids, but cannot rule out new onset CHF. His last TTE was normal with EF: 70%. His albumin is low at 2.6 which may also be contributing.   - LE dopplers negative for DVT   - BNP slightly elevated at 103      3. Altered mental status - resolved as hypercapnia improved    Recommendations:   - Oxygen supplementation to keep spO2 88-92%  - Methyprednisolone 40mg IV q12 hours, may be able to taper to oral prednisone tomorrow  - BiPAP for hypercapnia, needs to be set up for home BiPAP at night  - Continue doxycycline  - Duonebs q4 hours  - Budesonide BID   - Continue mucinex    Please page [phone] with any questions/concerns.         Fausto Cedillo MD  1/10/2020  Pulmonary & Critical Care Fellow  PGY-5

## 2020-01-11 PROBLEM — R73.9 STEROID-INDUCED HYPERGLYCEMIA: Status: ACTIVE | Noted: 2020-01-07

## 2020-01-11 PROBLEM — T38.0X5A STEROID-INDUCED HYPERGLYCEMIA: Status: ACTIVE | Noted: 2020-01-07

## 2020-01-11 LAB
ANION GAP SERPL CALC-SCNC: 9 MEQ/L (ref 3–15)
BASOPHILS # BLD: 0.04 K/UL (ref 0.01–0.1)
BASOPHILS NFR BLD: 0.2 %
BUN SERPL-MCNC: 40 MG/DL (ref 8–20)
CALCIUM SERPL-MCNC: 8.1 MG/DL (ref 8.9–10.3)
CHLORIDE SERPL-SCNC: 102 MEQ/L (ref 98–109)
CO2 SERPL-SCNC: 26 MEQ/L (ref 22–32)
CREAT SERPL-MCNC: 1.4 MG/DL
DIFFERENTIAL METHOD BLD: ABNORMAL
EOSINOPHIL # BLD: 0.01 K/UL (ref 0.04–0.54)
EOSINOPHIL NFR BLD: 0.1 %
ERYTHROCYTE [DISTWIDTH] IN BLOOD BY AUTOMATED COUNT: 13.1 % (ref 11.6–14.4)
GFR SERPL CREATININE-BSD FRML MDRD: 48.5 ML/MIN/1.73M*2
GLUCOSE BLD-MCNC: 261 MG/DL (ref 70–99)
GLUCOSE BLD-MCNC: 262 MG/DL (ref 70–99)
GLUCOSE BLD-MCNC: 283 MG/DL (ref 70–99)
GLUCOSE BLD-MCNC: 291 MG/DL (ref 70–99)
GLUCOSE SERPL-MCNC: 278 MG/DL (ref 70–99)
HCT VFR BLDCO AUTO: 42 % (ref 40.1–51)
HGB BLD-MCNC: 14.3 G/DL
IMM GRANULOCYTES # BLD AUTO: 0.38 K/UL (ref 0–0.08)
IMM GRANULOCYTES NFR BLD AUTO: 2.1 %
LYMPHOCYTES # BLD: 0.29 K/UL (ref 1.2–3.5)
LYMPHOCYTES NFR BLD: 1.6 %
MAGNESIUM SERPL-MCNC: 1.9 MG/DL (ref 1.8–2.5)
MCH RBC QN AUTO: 31.9 PG (ref 28–33.2)
MCHC RBC AUTO-ENTMCNC: 34 G/DL (ref 32.2–36.5)
MCV RBC AUTO: 93.8 FL (ref 83–98)
MONOCYTES # BLD: 0.36 K/UL (ref 0.3–1)
MONOCYTES NFR BLD: 2 %
NEUTROPHILS # BLD: 16.85 K/UL (ref 1.7–7)
NEUTS SEG NFR BLD: 94 %
NRBC BLD-RTO: 0 %
PDW BLD AUTO: 10.7 FL (ref 9.4–12.4)
PLATELET # BLD AUTO: 147 K/UL
POCT TEST: ABNORMAL
POTASSIUM SERPL-SCNC: 4.3 MEQ/L (ref 3.6–5.1)
RBC # BLD AUTO: 4.48 M/UL (ref 4.5–5.8)
SODIUM SERPL-SCNC: 137 MEQ/L (ref 136–144)
WBC # BLD AUTO: 17.93 K/UL

## 2020-01-11 PROCEDURE — 63600000 HC DRUGS/DETAIL CODE: Performed by: STUDENT IN AN ORGANIZED HEALTH CARE EDUCATION/TRAINING PROGRAM

## 2020-01-11 PROCEDURE — 21400000 HC ROOM AND CARE CCU/INTERMEDIATE

## 2020-01-11 PROCEDURE — 63700000 HC SELF-ADMINISTRABLE DRUG: Performed by: STUDENT IN AN ORGANIZED HEALTH CARE EDUCATION/TRAINING PROGRAM

## 2020-01-11 PROCEDURE — 85025 COMPLETE CBC W/AUTO DIFF WBC: CPT | Performed by: STUDENT IN AN ORGANIZED HEALTH CARE EDUCATION/TRAINING PROGRAM

## 2020-01-11 PROCEDURE — 99233 SBSQ HOSP IP/OBS HIGH 50: CPT | Performed by: INTERNAL MEDICINE

## 2020-01-11 PROCEDURE — 83735 ASSAY OF MAGNESIUM: CPT | Performed by: STUDENT IN AN ORGANIZED HEALTH CARE EDUCATION/TRAINING PROGRAM

## 2020-01-11 PROCEDURE — 80048 BASIC METABOLIC PNL TOTAL CA: CPT | Performed by: STUDENT IN AN ORGANIZED HEALTH CARE EDUCATION/TRAINING PROGRAM

## 2020-01-11 PROCEDURE — 36415 COLL VENOUS BLD VENIPUNCTURE: CPT | Performed by: STUDENT IN AN ORGANIZED HEALTH CARE EDUCATION/TRAINING PROGRAM

## 2020-01-11 PROCEDURE — 25000000 HC PHARMACY GENERAL: Performed by: STUDENT IN AN ORGANIZED HEALTH CARE EDUCATION/TRAINING PROGRAM

## 2020-01-11 PROCEDURE — 94660 CPAP INITIATION&MGMT: CPT

## 2020-01-11 RX ORDER — INSULIN ASPART 100 [IU]/ML
6 INJECTION, SOLUTION INTRAVENOUS; SUBCUTANEOUS
Status: DISCONTINUED | OUTPATIENT
Start: 2020-01-11 | End: 2020-01-13

## 2020-01-11 RX ORDER — INSULIN GLARGINE 100 [IU]/ML
15 INJECTION, SOLUTION SUBCUTANEOUS NIGHTLY
Status: DISCONTINUED | OUTPATIENT
Start: 2020-01-11 | End: 2020-01-13 | Stop reason: HOSPADM

## 2020-01-11 RX ORDER — PREDNISONE 20 MG/1
40 TABLET ORAL DAILY
Status: DISCONTINUED | OUTPATIENT
Start: 2020-01-11 | End: 2020-01-13 | Stop reason: HOSPADM

## 2020-01-11 RX ORDER — INSULIN ASPART 100 [IU]/ML
6 INJECTION, SOLUTION INTRAVENOUS; SUBCUTANEOUS ONCE
Status: COMPLETED | OUTPATIENT
Start: 2020-01-11 | End: 2020-01-11

## 2020-01-11 RX ADMIN — ESCITALOPRAM OXALATE 10 MG: 10 TABLET, FILM COATED ORAL at 08:14

## 2020-01-11 RX ADMIN — INSULIN ASPART 6 UNITS: 100 INJECTION, SOLUTION INTRAVENOUS; SUBCUTANEOUS at 17:09

## 2020-01-11 RX ADMIN — CARVEDILOL 3.12 MG: 3.12 TABLET, FILM COATED ORAL at 17:13

## 2020-01-11 RX ADMIN — IPRATROPIUM BROMIDE AND ALBUTEROL SULFATE 3 ML: 2.5; .5 SOLUTION RESPIRATORY (INHALATION) at 22:09

## 2020-01-11 RX ADMIN — CARVEDILOL 3.12 MG: 3.12 TABLET, FILM COATED ORAL at 08:14

## 2020-01-11 RX ADMIN — BUDESONIDE 0.5 MG: 0.5 INHALANT ORAL at 17:15

## 2020-01-11 RX ADMIN — DOXYCYCLINE HYCLATE 100 MG: 100 TABLET, FILM COATED ORAL at 20:37

## 2020-01-11 RX ADMIN — IPRATROPIUM BROMIDE AND ALBUTEROL SULFATE 3 ML: 2.5; .5 SOLUTION RESPIRATORY (INHALATION) at 17:15

## 2020-01-11 RX ADMIN — INSULIN ASPART 5 UNITS: 100 INJECTION, SOLUTION INTRAVENOUS; SUBCUTANEOUS at 12:17

## 2020-01-11 RX ADMIN — BUDESONIDE 0.5 MG: 0.5 INHALANT ORAL at 05:48

## 2020-01-11 RX ADMIN — GUAIFENESIN 600 MG: 600 TABLET ORAL at 20:37

## 2020-01-11 RX ADMIN — PREDNISONE 40 MG: 20 TABLET ORAL at 11:58

## 2020-01-11 RX ADMIN — ENOXAPARIN SODIUM 40 MG: 40 INJECTION, SOLUTION INTRAVENOUS; SUBCUTANEOUS at 17:13

## 2020-01-11 RX ADMIN — MONTELUKAST 10 MG: 10 TABLET, FILM COATED ORAL at 22:09

## 2020-01-11 RX ADMIN — INSULIN ASPART 6 UNITS: 100 INJECTION, SOLUTION INTRAVENOUS; SUBCUTANEOUS at 12:17

## 2020-01-11 RX ADMIN — DOXYCYCLINE HYCLATE 100 MG: 100 TABLET, FILM COATED ORAL at 08:14

## 2020-01-11 RX ADMIN — INSULIN ASPART 6 UNITS: 100 INJECTION, SOLUTION INTRAVENOUS; SUBCUTANEOUS at 08:32

## 2020-01-11 RX ADMIN — FAMOTIDINE 40 MG: 20 TABLET, FILM COATED ORAL at 08:14

## 2020-01-11 RX ADMIN — FINASTERIDE 5 MG: 5 TABLET, FILM COATED ORAL at 08:14

## 2020-01-11 RX ADMIN — IPRATROPIUM BROMIDE AND ALBUTEROL SULFATE 3 ML: 2.5; .5 SOLUTION RESPIRATORY (INHALATION) at 13:27

## 2020-01-11 RX ADMIN — ATORVASTATIN CALCIUM 10 MG: 10 TABLET, FILM COATED ORAL at 08:14

## 2020-01-11 RX ADMIN — IPRATROPIUM BROMIDE AND ALBUTEROL SULFATE 3 ML: 2.5; .5 SOLUTION RESPIRATORY (INHALATION) at 05:48

## 2020-01-11 RX ADMIN — TAMSULOSIN HYDROCHLORIDE 0.4 MG: 0.4 CAPSULE ORAL at 22:09

## 2020-01-11 RX ADMIN — INSULIN ASPART 5 UNITS: 100 INJECTION, SOLUTION INTRAVENOUS; SUBCUTANEOUS at 08:32

## 2020-01-11 RX ADMIN — PANTOPRAZOLE SODIUM 20 MG: 20 TABLET, DELAYED RELEASE ORAL at 08:14

## 2020-01-11 RX ADMIN — INSULIN GLARGINE 15 UNITS: 100 INJECTION, SOLUTION SUBCUTANEOUS at 22:07

## 2020-01-11 RX ADMIN — INSULIN ASPART 3 UNITS: 100 INJECTION, SOLUTION INTRAVENOUS; SUBCUTANEOUS at 17:10

## 2020-01-11 RX ADMIN — INSULIN ASPART 5 UNITS: 100 INJECTION, SOLUTION INTRAVENOUS; SUBCUTANEOUS at 22:06

## 2020-01-11 RX ADMIN — DILTIAZEM HYDROCHLORIDE 120 MG: 120 CAPSULE, COATED, EXTENDED RELEASE ORAL at 08:14

## 2020-01-11 RX ADMIN — AMLODIPINE BESYLATE 5 MG: 5 TABLET ORAL at 08:14

## 2020-01-11 RX ADMIN — GUAIFENESIN 600 MG: 600 TABLET ORAL at 08:14

## 2020-01-11 RX ADMIN — CETIRIZINE HYDROCHLORIDE 10 MG: 10 TABLET, FILM COATED ORAL at 22:09

## 2020-01-11 RX ADMIN — IPRATROPIUM BROMIDE AND ALBUTEROL SULFATE 3 ML: 2.5; .5 SOLUTION RESPIRATORY (INHALATION) at 09:26

## 2020-01-11 NOTE — PROGRESS NOTES
"     Pulmonary Progress Note       SUBJECTIVE   Interval History: No major events overnight. He slept very well with BiPAP. Breathing better this morning, was able to ambulate to and from restroom yesterday evening and this morning with little issue other than generalized weakness, predominantly in legs.   OBJECTIVE      Vital Signs:   /75 (BP Location: Right upper arm, Patient Position: Lying)   Pulse 85   Temp 36.6 °C (97.8 °F) (Oral)   Resp 20   Ht 1.626 m (5' 4\")   Wt 75.8 kg (167 lb)   SpO2 95%   BMI 28.67 kg/m²     I/O's:  Intake/Output Summary (Last 24 hours) at 1/11/2020 1141  Last data filed at 1/11/2020 0600  Gross per 24 hour   Intake --   Output 600 ml   Net -600 ml     Medications: Reviewed. Pertinent medications as below.  • amLODIPine  5 mg oral Daily   • atorvastatin  10 mg oral Daily   • budesonide  0.5 mg nebulization BID (6a, 6p)   • carvedilol  3.125 mg oral BID with meals   • cetirizine  10 mg oral Nightly   • dilTIAZem CD  120 mg oral Daily   • doxycycline hyclate  100 mg oral q12h ASAEL   • enoxaparin  40 mg subcutaneous Daily (6p)   • escitalopram  10 mg oral Daily   • famotidine  40 mg oral Daily   • finasteride  5 mg oral Daily   • guaiFENesin  600 mg oral BID   • insulin aspart U-100  3-5 Units subcutaneous With meals & nightly   • insulin aspart U-100  6 Units subcutaneous TID with meals   • insulin glargine  15 Units subcutaneous Nightly   • ipratropium-albuterol  3 mL nebulization q4h ASAEL   • montelukast  10 mg oral Nightly   • pantoprazole  20 mg oral Daily   • predniSONE  40 mg oral Daily   • tamsulosin  0.4 mg oral Nightly     PHYSICAL EXAMINATION      Gen: awake and alert, relatively well-appearing, in no acute distress  HEENT: NC/AT, conjunctivae clear  Neck: neck supple  Lungs: largely clear to auscultation bilaterally, no notable crackles or wheezing  Heart: RRR, normal S1, S2  Abdomen: soft, NT, mildly distended  Extremities: warm, dry, no LE edema  Neuro: AAOx3, " spontaneously moving all extremities   DIAGNOSTIC DATA      Labs:  I have personally reviewed all pertinent patient laboratory results. Labs of note discussed below:  Lab Units 01/11/20 0921   SODIUM mEQ/L 137   POTASSIUM mEQ/L 4.3   CHLORIDE mEQ/L 102   CO2 mEQ/L 26   BUN mg/dL 40*   CREATININE mg/dL 1.4*   GLUCOSE mg/dL 278*   CALCIUM mg/dL 8.1*   Mg 1.9  Lab Units 01/11/20 0921   WBC K/uL 17.93*   HEMOGLOBIN g/dL 14.3   HEMATOCRIT % 42.0   PLATELETS K/uL 147*     Imaging: no new imaging  ASSESSMENT AND PLAN      85yo M with PMHx of COPD/emphysema with frequent exacerbations and HTN who presented with shortness of breath and delirium.     1. COPD exacerbation with RUL infiltrate  - Recently admitted for COPD exacerbation, was very slow to recover, discharged two days prior to admission on 2L NC and a prednisone taper  - Readmitted with dyspnea, productive cough, wheezing  - Evidence of hypercapnia on VBG on admission, improved with BiPAP     2. Lower extremity edema  - New onset bilateral L>R lower extremity edema, seems to have improved with BiPAP and likely autodiuresis  - LE dopplers negative for DVT   - BNP slightly elevated at 103, TTE 1/8 with EF 75%, normal diastolic function, no RWMA, normal RV size and function, normal right-sided filling pressures     3. Altered mental status  - Resolved as hypercapnia improved     Recommendations:   - Okay to transition to oral prednisone 40mg daily, continue same dose over weekend and will re-evaluate Monday for taper  - Continue BiPAP, team working on getting this set up for home use, will need to on discharge  - Continue doxycycline to complete 7 day course  - Continue budesonide, duonebs, mucinex    Please page [phone] with any questions/concerns.     Kay Reid MD  Pulmonary/Critical Care Fellow  PGY4, [id number]  1/11/2020  11:41 AM

## 2020-01-11 NOTE — PLAN OF CARE
Problem: Adult Inpatient Plan of Care  Goal: Plan of Care Review  Outcome: Progressing  Flowsheets (Taken 1/11/2020 6240)  Progress: improving  Plan of Care Reviewed With: patient  Outcome Summary: Pt feeling less SOB, weaned to 3L NC sating 95%

## 2020-01-12 LAB
ANION GAP SERPL CALC-SCNC: 8 MEQ/L (ref 3–15)
BACTERIA BLD CULT: NORMAL
BACTERIA BLD CULT: NORMAL
BASOPHILS # BLD: 0.03 K/UL (ref 0.01–0.1)
BASOPHILS NFR BLD: 0.2 %
BUN SERPL-MCNC: 38 MG/DL (ref 8–20)
CALCIUM SERPL-MCNC: 8.5 MG/DL (ref 8.9–10.3)
CHLORIDE SERPL-SCNC: 102 MEQ/L (ref 98–109)
CO2 SERPL-SCNC: 28 MEQ/L (ref 22–32)
CREAT SERPL-MCNC: 1.1 MG/DL
DIFFERENTIAL METHOD BLD: ABNORMAL
EOSINOPHIL # BLD: 0.02 K/UL (ref 0.04–0.54)
EOSINOPHIL NFR BLD: 0.1 %
ERYTHROCYTE [DISTWIDTH] IN BLOOD BY AUTOMATED COUNT: 13.2 % (ref 11.6–14.4)
GFR SERPL CREATININE-BSD FRML MDRD: >60 ML/MIN/1.73M*2
GLUCOSE BLD-MCNC: 114 MG/DL (ref 70–99)
GLUCOSE BLD-MCNC: 172 MG/DL (ref 70–99)
GLUCOSE BLD-MCNC: 173 MG/DL (ref 70–99)
GLUCOSE BLD-MCNC: 226 MG/DL (ref 70–99)
GLUCOSE SERPL-MCNC: 148 MG/DL (ref 70–99)
HCT VFR BLDCO AUTO: 41.2 % (ref 40.1–51)
HGB BLD-MCNC: 13.7 G/DL
IMM GRANULOCYTES # BLD AUTO: 0.31 K/UL (ref 0–0.08)
IMM GRANULOCYTES NFR BLD AUTO: 2 %
LYMPHOCYTES # BLD: 0.7 K/UL (ref 1.2–3.5)
LYMPHOCYTES NFR BLD: 4.5 %
MAGNESIUM SERPL-MCNC: 1.8 MG/DL (ref 1.8–2.5)
MCH RBC QN AUTO: 31.4 PG (ref 28–33.2)
MCHC RBC AUTO-ENTMCNC: 33.3 G/DL (ref 32.2–36.5)
MCV RBC AUTO: 94.3 FL (ref 83–98)
MONOCYTES # BLD: 0.53 K/UL (ref 0.3–1)
MONOCYTES NFR BLD: 3.4 %
NEUTROPHILS # BLD: 13.98 K/UL (ref 1.7–7)
NEUTS SEG NFR BLD: 89.8 %
NRBC BLD-RTO: 0 %
PDW BLD AUTO: 9.7 FL (ref 9.4–12.4)
PLATELET # BLD AUTO: 126 K/UL
POCT TEST: ABNORMAL
POTASSIUM SERPL-SCNC: 4.1 MEQ/L (ref 3.6–5.1)
RBC # BLD AUTO: 4.37 M/UL (ref 4.5–5.8)
SODIUM SERPL-SCNC: 138 MEQ/L (ref 136–144)
WBC # BLD AUTO: 15.57 K/UL

## 2020-01-12 PROCEDURE — 21400000 HC ROOM AND CARE CCU/INTERMEDIATE

## 2020-01-12 PROCEDURE — 36415 COLL VENOUS BLD VENIPUNCTURE: CPT | Performed by: STUDENT IN AN ORGANIZED HEALTH CARE EDUCATION/TRAINING PROGRAM

## 2020-01-12 PROCEDURE — 83735 ASSAY OF MAGNESIUM: CPT | Performed by: STUDENT IN AN ORGANIZED HEALTH CARE EDUCATION/TRAINING PROGRAM

## 2020-01-12 PROCEDURE — 80048 BASIC METABOLIC PNL TOTAL CA: CPT | Performed by: STUDENT IN AN ORGANIZED HEALTH CARE EDUCATION/TRAINING PROGRAM

## 2020-01-12 PROCEDURE — 97535 SELF CARE MNGMENT TRAINING: CPT | Mod: GO

## 2020-01-12 PROCEDURE — 99233 SBSQ HOSP IP/OBS HIGH 50: CPT | Performed by: INTERNAL MEDICINE

## 2020-01-12 PROCEDURE — 63600000 HC DRUGS/DETAIL CODE: Performed by: STUDENT IN AN ORGANIZED HEALTH CARE EDUCATION/TRAINING PROGRAM

## 2020-01-12 PROCEDURE — 85025 COMPLETE CBC W/AUTO DIFF WBC: CPT | Performed by: STUDENT IN AN ORGANIZED HEALTH CARE EDUCATION/TRAINING PROGRAM

## 2020-01-12 PROCEDURE — 94660 CPAP INITIATION&MGMT: CPT

## 2020-01-12 PROCEDURE — 25000000 HC PHARMACY GENERAL: Performed by: STUDENT IN AN ORGANIZED HEALTH CARE EDUCATION/TRAINING PROGRAM

## 2020-01-12 PROCEDURE — 63700000 HC SELF-ADMINISTRABLE DRUG: Performed by: STUDENT IN AN ORGANIZED HEALTH CARE EDUCATION/TRAINING PROGRAM

## 2020-01-12 RX ADMIN — ATORVASTATIN CALCIUM 10 MG: 10 TABLET, FILM COATED ORAL at 08:01

## 2020-01-12 RX ADMIN — PREDNISONE 40 MG: 20 TABLET ORAL at 08:00

## 2020-01-12 RX ADMIN — FAMOTIDINE 40 MG: 20 TABLET, FILM COATED ORAL at 08:00

## 2020-01-12 RX ADMIN — DOXYCYCLINE HYCLATE 100 MG: 100 TABLET, FILM COATED ORAL at 08:00

## 2020-01-12 RX ADMIN — ENOXAPARIN SODIUM 40 MG: 40 INJECTION, SOLUTION INTRAVENOUS; SUBCUTANEOUS at 17:13

## 2020-01-12 RX ADMIN — IPRATROPIUM BROMIDE AND ALBUTEROL SULFATE 3 ML: 2.5; .5 SOLUTION RESPIRATORY (INHALATION) at 05:55

## 2020-01-12 RX ADMIN — INSULIN ASPART 6 UNITS: 100 INJECTION, SOLUTION INTRAVENOUS; SUBCUTANEOUS at 12:37

## 2020-01-12 RX ADMIN — IPRATROPIUM BROMIDE AND ALBUTEROL SULFATE 3 ML: 2.5; .5 SOLUTION RESPIRATORY (INHALATION) at 17:12

## 2020-01-12 RX ADMIN — GUAIFENESIN 600 MG: 600 TABLET ORAL at 20:02

## 2020-01-12 RX ADMIN — INSULIN ASPART 6 UNITS: 100 INJECTION, SOLUTION INTRAVENOUS; SUBCUTANEOUS at 08:04

## 2020-01-12 RX ADMIN — DILTIAZEM HYDROCHLORIDE 120 MG: 120 CAPSULE, COATED, EXTENDED RELEASE ORAL at 08:00

## 2020-01-12 RX ADMIN — GUAIFENESIN 600 MG: 600 TABLET ORAL at 08:00

## 2020-01-12 RX ADMIN — DOXYCYCLINE HYCLATE 100 MG: 100 TABLET, FILM COATED ORAL at 21:20

## 2020-01-12 RX ADMIN — MONTELUKAST 10 MG: 10 TABLET, FILM COATED ORAL at 22:18

## 2020-01-12 RX ADMIN — IPRATROPIUM BROMIDE AND ALBUTEROL SULFATE 3 ML: 2.5; .5 SOLUTION RESPIRATORY (INHALATION) at 10:03

## 2020-01-12 RX ADMIN — IPRATROPIUM BROMIDE AND ALBUTEROL SULFATE 3 ML: 2.5; .5 SOLUTION RESPIRATORY (INHALATION) at 02:13

## 2020-01-12 RX ADMIN — FINASTERIDE 5 MG: 5 TABLET, FILM COATED ORAL at 08:01

## 2020-01-12 RX ADMIN — CARVEDILOL 3.12 MG: 3.12 TABLET, FILM COATED ORAL at 07:58

## 2020-01-12 RX ADMIN — PANTOPRAZOLE SODIUM 20 MG: 20 TABLET, DELAYED RELEASE ORAL at 08:00

## 2020-01-12 RX ADMIN — TAMSULOSIN HYDROCHLORIDE 0.4 MG: 0.4 CAPSULE ORAL at 22:18

## 2020-01-12 RX ADMIN — CARVEDILOL 3.12 MG: 3.12 TABLET, FILM COATED ORAL at 17:12

## 2020-01-12 RX ADMIN — INSULIN ASPART 6 UNITS: 100 INJECTION, SOLUTION INTRAVENOUS; SUBCUTANEOUS at 17:13

## 2020-01-12 RX ADMIN — ESCITALOPRAM OXALATE 10 MG: 10 TABLET, FILM COATED ORAL at 08:04

## 2020-01-12 RX ADMIN — CETIRIZINE HYDROCHLORIDE 10 MG: 10 TABLET, FILM COATED ORAL at 22:18

## 2020-01-12 RX ADMIN — AMLODIPINE BESYLATE 5 MG: 5 TABLET ORAL at 08:01

## 2020-01-12 RX ADMIN — INSULIN GLARGINE 15 UNITS: 100 INJECTION, SOLUTION SUBCUTANEOUS at 22:21

## 2020-01-12 RX ADMIN — IPRATROPIUM BROMIDE AND ALBUTEROL SULFATE 3 ML: 2.5; .5 SOLUTION RESPIRATORY (INHALATION) at 20:01

## 2020-01-12 RX ADMIN — INSULIN ASPART 3 UNITS: 100 INJECTION, SOLUTION INTRAVENOUS; SUBCUTANEOUS at 12:37

## 2020-01-12 RX ADMIN — BUDESONIDE 0.5 MG: 0.5 INHALANT ORAL at 05:55

## 2020-01-12 RX ADMIN — BUDESONIDE 0.5 MG: 0.5 INHALANT ORAL at 17:12

## 2020-01-12 RX ADMIN — IPRATROPIUM BROMIDE AND ALBUTEROL SULFATE 3 ML: 2.5; .5 SOLUTION RESPIRATORY (INHALATION) at 14:41

## 2020-01-12 ASSESSMENT — COGNITIVE AND FUNCTIONAL STATUS - GENERAL
DRESSING REGULAR UPPER BODY CLOTHING: 3 - A LITTLE
DRESSING REGULAR LOWER BODY CLOTHING: 3 - A LITTLE
HELP NEEDED FOR PERSONAL GROOMING: 3 - A LITTLE
AFFECT: WFL
TOILETING: 4 - NONE
EATING MEALS: 4 - NONE
HELP NEEDED FOR BATHING: 3 - A LITTLE

## 2020-01-12 NOTE — PLAN OF CARE
Problem: Adult Inpatient Plan of Care  Goal: Plan of Care Review  Outcome: Progressing  Flowsheets (Taken 1/12/2020 0814)  Progress: improving  Plan of Care Reviewed With: patient  Outcome Summary: Pt feeling better today, less SOB, blood sugar more controlled this am at 114

## 2020-01-12 NOTE — PROGRESS NOTES
"     Internal Medicine  Daily Progress Note       SUBJECTIVE   This is a 84 y.o. year-old male admitted on 2020 with Shortness of breath [R06.02]  Acute delirium [R41.0]  COPD exacerbation (CMS/HCC) [J44.1]  Bilateral leg edema [R60.0].    Interval History: patient endorsed improved breathing this AM.    I talked to the patient, his daughter who was present at the bedside, patient's wife (over the phone) about plans for discharge home tomorrow AM.  I discussed that patient will likely go home with temporary insulin glargine while blood sugars are still normalizing from steroids in order to avoid re-admission for symptomatic hyperglycemia.  The patient and family all seemed to be on board with this plan.   OBJECTIVE      Vital signs in last 24 hours:  Temp:  [36.1 °C (97 °F)-36.6 °C (97.9 °F)] 36.6 °C (97.9 °F)  Heart Rate:  [] 91  Resp:  [16-22] 18  BP: (116-134)/(59-76) 134/76  FiO2 (%) (Set):  [40 %] 40 %  Temp (24hrs), Av.4 °C (97.5 °F), Min:36.1 °C (97 °F), Max:36.6 °C (97.9 °F)        PHYSICAL EXAMINATION      PHYSICAL EXAM  Vitals: Visit Vitals  /76 (BP Location: Right upper arm, Patient Position: Lying)   Pulse 91   Temp 36.6 °C (97.9 °F) (Oral)   Resp 18   Ht 1.626 m (5' 4\")   Wt 75.8 kg (167 lb)   SpO2 97%   BMI 28.67 kg/m²      General: Non-toxic appearing   HEENT:  moist mucus membranes  No JVD   Eyes: Conjunctiva clear, PERRLA   Cardiac: RRR  No murmurs, rubs, or gallops   Pulmonary: Bibasilar crackles, less end-expiratory wheezes, improved air movement   Abdomen: Soft, non-tender, +BS  No rebound, no guarding       Extremities: B/L +1 pitting edema L > R, improved since admission   Neuro: AAO x 3, answers questions appropriately, speaks conversationally   Psych: Appropriate, cooperative   Skin: Dry, onymycosis                 LABS / IMAGING / TELE      Labs  Lab Results   Component Value Date    WBC 15.57 (H) 2020    HGB 13.7 2020    HCT 41.2 2020    MCV 94.3 " 01/12/2020     (L) 01/12/2020     Lab Results   Component Value Date    GLUCOSE 148 (H) 01/12/2020    CALCIUM 8.5 (L) 01/12/2020     01/12/2020    K 4.1 01/12/2020    CO2 28 01/12/2020     01/12/2020    BUN 38 (H) 01/12/2020    CREATININE 1.1 01/12/2020     Lab Results   Component Value Date    ALT 34 01/07/2020    AST 18 01/07/2020     (H) 11/04/2016    ALKPHOS 66 01/07/2020    BILITOT 0.6 01/07/2020       Imaging  CT Chest 1/9/2020:   Interval development of an area of new somewhat 'clustered' patchy pulmonary  consolidative opacities in the right upper lobe.  Additional right upper lobe  new 8mm pulmonary nodular density.     ECG/Telemetry  Telemetry reviewed.     ASSESSMENT AND PLAN      Acute on chronic respiratory failure with hypoxia and hypercapnia (CMS/HCC)  Assessment & Plan  Patient presents with increased oxygen requirements and a pCO2 of 73 compared to pCO2 of 53 from 12/2019 admission  Dx favorable for increased CO2 retention 2/2 COPD flare   -  prednisone 40 mg PO daily, will send home on a prolonged taper  - budesonide nebs q 12 hours,will change albuterol-ipratropium q 4 hours to PRN  - montelukast  - BiPAP QHS and PRN  - empiric doxycycline for a 7 day course - end-date 1/13  - pulmonology following, appreciate recommendations    Encephalopathy  Assessment & Plan  Dx most favorable for 2/2 acute on chronic hypercapnea, as resolved after BiPAP use  - plan to go home with BiPAP 18/8    Steroid-induced hyperglycemia  Assessment & Plan  1/7/2020 Hemoglobin A1c 7.8%   Continued hyperglycemia being driven by steroids  - standing insulin aspart 6 units TID with meals and 15 units QHS  - CF with accuchecks   - discussed with patient, wife, and daughter will likely be discharged home on a temporary insulin glargine to avoid re-admission for symptomatic hyperglycemia    Acute kidney injury superimposed on chronic kidney disease (CMS/HCC)  Assessment & Plan  Cr back down to baseline  1.1  - hold off on further diuresis  - encourage PO intake  - trend with chemistries  - renally dose medications    GERD (gastroesophageal reflux disease)  Assessment & Plan  - PPI and H2 blocker    History of prostate cancer  Assessment & Plan  - finasteride, tamsulosin    Hyperlipidemia  Assessment & Plan  - atorvastatin    Hypertension  Assessment & Plan  - amlodipine, diltiazem and carvedilol with hold parameters       VTE Assessment: [place]    Code Status: Full Code  Estimated discharge date: 1/10/2020     ATTENDING DOCUMENTATION  ALSO SEE ATTENDING ATTESTATION SECTION OF NOTE

## 2020-01-12 NOTE — PROGRESS NOTES
Patient: Zuhair Luong Jr  Location: [place] [address]  MRN: [id number]  Today's date: 1/12/2020    Pt left seated in recliner, stated needs met, call bell in reach, RN aware    Ed is a 84 y.o. male admitted on 1/7/2020 with Shortness of breath. Principal problem is No Principal Problem: There is no principal problem currently on the Problem List. Please update the Problem List and refresh..    Past Medical History  Ed has a past medical history of Chronic kidney disease, COPD (chronic obstructive pulmonary disease) (CMS/[facility]), Diverticulitis of colon, Emphysema lung (CMS/[facility]), Hypertension, and Prostate cancer (CMS/[facility]).    History of Present Illness DX acute on chronic respiratory failure w/ exac of COPD/ LE edema      OT Vitals    Date/Time SpO2 Pt Activity O2 Therapy O2 Del Method O2 Flow Rate [facility]   01/12/20 1523 97 % At rest None (Room air) Nasal cannula 3 L/min DNM      OT Pain    Date/Time Pain Type Pref Pain Scale Rating: Rest Rating: Activity [facility]   01/12/20 1523 Pain Assessment word (verbal rating pain scale) 0 - no pain 0 - no pain DNM          Prior Living Environment      Most Recent Value   Living Arrangements  house   Living Environment Comment  2SH, spouse, 1STE, 1st flr set up w/ tub combo/ shower chair avail,    Number of Stairs, Main Entrance  1   Stair Railings, Main Entrance  none   Stairs Comment, Main Entrance  -- [first floor setup]          Prior Level of Function      Most Recent Value   Dominant Hand  right   Ambulation  independent   Transferring  independent   Toileting  independent   Bathing  independent   Dressing  independent   Eating  independent   Communication  understands/communicates without difficulty   Swallowing  swallows foods/liquids without difficulty   Prior Level of Function Comment  PTA< ind ADL/ transfers,  2L Ox at home   Equipment Currently Used at Home  walker, front-wheeled, wheelchair, nebulizer, oxygen, other (see comments) [oxygen and  nebulizer are through [employer].]          OT Evaluation and Treatment - 01/12/20 1523        Time Calculation    Start Time  1523     Stop Time  1534     Time Calculation (min)  11 min        Session Details    Document Type  daily treatment/progress note     Mode of Treatment  occupational therapy        General Information    Patient Profile Reviewed?  yes     General Observations of Patient  Pt received ambulating to bathroom, agreeable to therapy     Existing Precautions/Restrictions  fall;aspiration;oxygen therapy device and L/min        Cognition/Psychosocial    Affect/Mental Status (Cognitive)  WFL        Sit to Stand Transfer    [place], Sit to Stand Transfer  supervision        Stand to Sit Transfer    [place], Stand to Sit Transfer  supervision        Bathing    Comment  Pt educated on use of shower chair for energy conservation during bathing ADL and to have supervision for shower transfers       Lower Body Dressing    Self-Performance  --    don/doff sock    [place]  supervision;verbal cues     Position  supported sitting     Comment  Reviewed adapted technique and pacing during LBD task. Reviewed PLB. Pt able to demonstrate with supervision and VCs       Grooming    Self-Performance  washes, rinses and dries face     [place]  supervision     Position  sink side        AM-PAC (TM) - ADL (Current Function)    Putting on and taking off regular lower body clothing?  3 - A Little     Bathing?  3 - A Little     Toileting?  4 - None     Putting on/taking off regular upper body clothing?  3 - A Little     How much help for taking care of personal grooming?  3 - A Little     Eating meals?  4 - None     AM-PAC (TM) ADL Score  20        Therapy Assessment/Plan (OT)    Rehab Potential (OT)  good, to achieve stated therapy goals     Therapy Frequency (OT)  3 times/wk        Progress Summary (OT)    Daily Outcome Statement (OT)  Follow up OT session completed. Pt cont to be limited by dec activity  tolerance/SOB. Pt sup for functional transfers/ADLs. Reviewed adapted ADLs/energy conservation/pacing and PLB. Rec home with family assistance and home OT.         Therapy Plan Review/Discharge Plan (OT)    Anticipated Equipment Needs At Discharge (OT)  other (see comments)    has shower chair    OT Recommended Discharge Disposition  home with assist;home with home health                   Education provided this session. See the Patient Education summary report for full details.         OT Goals      Most Recent Value   Bed Mobility Goal 1   Activity/Assistive Device  bed mobility activities, all at 01/09/2020 1509   [place]  modified independence at 01/09/2020 1509   Time Frame  by discharge at 01/09/2020 1509   Progress/Outcome  goal ongoing at 01/09/2020 1509   Transfer Goal 1   Activity/Assistive Device  sit-to-stand/stand-to-sit, bed-to-chair/chair-to-bed, walker, front-wheeled at 01/09/2020 1509   [place]  modified independence at 01/09/2020 1509   Time Frame  by discharge at 01/09/2020 1509   Progress/Outcome  goal ongoing at 01/09/2020 1509   Transfer Goal 2   Activity/Assistive Device  toilet, walker, front-wheeled at 01/09/2020 1509   [place]  modified independence at 01/09/2020 1509   Time Frame  by discharge at 01/09/2020 1509   Progress/Outcome  goal ongoing at 01/09/2020 1509   Bathing Goal 1   Activity/Assistive Device  bathing skills, all at 01/09/2020 1509   [place]  modified independence at 01/09/2020 1509   Time Frame  by discharge at 01/09/2020 1509   Progress/Outcome  goal ongoing at 01/09/2020 1509   Dressing Goal 1   Activity/Adaptive Equipment  upper body dressing at 01/09/2020 1509   [place]  independent at 01/09/2020 1509   Time Frame  by discharge at 01/09/2020 1509   Progress/Outcome  goal ongoing at 01/09/2020 1509   Dressing Goal 2   Activity/Adaptive Equipment  lower body dressing at 01/09/2020 1509   [place]  modified independence at 01/09/2020 1509    Time Frame  by discharge at 01/09/2020 1509   Progress/Outcome  goal ongoing at 01/09/2020 1509   Toileting Goal 1   Activity/Assistive Device  toileting skills, all at 01/09/2020 1509   [place]  modified independence at 01/09/2020 1509   Time Frame  by discharge at 01/09/2020 1509   Progress/Outcome  goal ongoing at 01/09/2020 1508

## 2020-01-12 NOTE — PROGRESS NOTES
Fr. Conway administered the Sacrament of the Anointing of the Sick to [employer] patient as well as providing the Eucharist as requested by patient. -     Charted by    [id number]

## 2020-01-12 NOTE — PLAN OF CARE
Problem: Adult Inpatient Plan of Care  Goal: Plan of Care Review  Outcome: Progressing  Flowsheets (Taken 1/12/2020 0230)  Progress: improving  Plan of Care Reviewed With: patient  Outcome Summary: pt feeling better on bipap at night, getting optimal rest     Problem: Adult Inpatient Plan of Care  Goal: Patient-Specific Goal (Individualization)  Outcome: Progressing  Flowsheets (Taken 1/12/2020 0230)  Patient-Specific Goals (Include Timeframe): increase activity as tolerated  Individualized Care Needs: Bipap at bedtime  Anxieties, Fears or Concerns: difficulty breathing

## 2020-01-12 NOTE — PLAN OF CARE
Problem: Adult Inpatient Plan of Care  Goal: Plan of Care Review  1/12/2020 1544 by Julia Chavez, ANDREA  Flowsheets (Taken 1/12/2020 1544)  Progress: improving  Plan of Care Reviewed With: patient  Outcome Summary: Follow up OT session completed. Pt cont to be limited by dec activity tolerance/SOB. Reviewed adapted ADLs, energy conservation, pacing. Rec home with family assistance and home OT

## 2020-01-13 VITALS
DIASTOLIC BLOOD PRESSURE: 73 MMHG | TEMPERATURE: 97.8 F | BODY MASS INDEX: 28.51 KG/M2 | HEART RATE: 103 BPM | OXYGEN SATURATION: 95 % | RESPIRATION RATE: 18 BRPM | HEIGHT: 64 IN | WEIGHT: 167 LBS | SYSTOLIC BLOOD PRESSURE: 152 MMHG

## 2020-01-13 PROBLEM — N18.9 ACUTE KIDNEY INJURY SUPERIMPOSED ON CHRONIC KIDNEY DISEASE (CMS/HCC)  (CMS/HCC): Status: RESOLVED | Noted: 2019-04-11 | Resolved: 2020-01-13

## 2020-01-13 PROBLEM — N17.9 ACUTE KIDNEY INJURY SUPERIMPOSED ON CHRONIC KIDNEY DISEASE (CMS/HCC)  (CMS/HCC): Status: RESOLVED | Noted: 2019-04-11 | Resolved: 2020-01-13

## 2020-01-13 LAB
ANION GAP SERPL CALC-SCNC: 9 MEQ/L (ref 3–15)
BASOPHILS # BLD: 0.03 K/UL (ref 0.01–0.1)
BASOPHILS NFR BLD: 0.2 %
BUN SERPL-MCNC: 33 MG/DL (ref 8–20)
CALCIUM SERPL-MCNC: 8.5 MG/DL (ref 8.9–10.3)
CHLORIDE SERPL-SCNC: 99 MEQ/L (ref 98–109)
CO2 SERPL-SCNC: 29 MEQ/L (ref 22–32)
CREAT SERPL-MCNC: 1 MG/DL
DIFFERENTIAL METHOD BLD: ABNORMAL
EOSINOPHIL # BLD: 0.04 K/UL (ref 0.04–0.54)
EOSINOPHIL NFR BLD: 0.3 %
ERYTHROCYTE [DISTWIDTH] IN BLOOD BY AUTOMATED COUNT: 13.5 % (ref 11.6–14.4)
GFR SERPL CREATININE-BSD FRML MDRD: >60 ML/MIN/1.73M*2
GLUCOSE BLD-MCNC: 137 MG/DL (ref 70–99)
GLUCOSE BLD-MCNC: 88 MG/DL (ref 70–99)
GLUCOSE SERPL-MCNC: 141 MG/DL (ref 70–99)
HCT VFR BLDCO AUTO: 39.4 % (ref 40.1–51)
HGB BLD-MCNC: 13.2 G/DL
IMM GRANULOCYTES # BLD AUTO: 0.17 K/UL (ref 0–0.08)
IMM GRANULOCYTES NFR BLD AUTO: 1.3 %
LYMPHOCYTES # BLD: 0.28 K/UL (ref 1.2–3.5)
LYMPHOCYTES NFR BLD: 2.1 %
MAGNESIUM SERPL-MCNC: 1.8 MG/DL (ref 1.8–2.5)
MCH RBC QN AUTO: 31.9 PG (ref 28–33.2)
MCHC RBC AUTO-ENTMCNC: 33.5 G/DL (ref 32.2–36.5)
MCV RBC AUTO: 95.2 FL (ref 83–98)
MONOCYTES # BLD: 0.4 K/UL (ref 0.3–1)
MONOCYTES NFR BLD: 3 %
NEUTROPHILS # BLD: 12.53 K/UL (ref 1.7–7)
NEUTS SEG NFR BLD: 93.1 %
NRBC BLD-RTO: 0 %
PDW BLD AUTO: 9.7 FL (ref 9.4–12.4)
PLATELET # BLD AUTO: 108 K/UL
POCT TEST: ABNORMAL
POCT TEST: NORMAL
POTASSIUM SERPL-SCNC: 4.5 MEQ/L (ref 3.6–5.1)
RBC # BLD AUTO: 4.14 M/UL (ref 4.5–5.8)
SODIUM SERPL-SCNC: 137 MEQ/L (ref 136–144)
WBC # BLD AUTO: 13.45 K/UL

## 2020-01-13 PROCEDURE — 25000000 HC PHARMACY GENERAL: Performed by: STUDENT IN AN ORGANIZED HEALTH CARE EDUCATION/TRAINING PROGRAM

## 2020-01-13 PROCEDURE — 94660 CPAP INITIATION&MGMT: CPT

## 2020-01-13 PROCEDURE — 99239 HOSP IP/OBS DSCHRG MGMT >30: CPT | Performed by: INTERNAL MEDICINE

## 2020-01-13 PROCEDURE — 83735 ASSAY OF MAGNESIUM: CPT | Performed by: STUDENT IN AN ORGANIZED HEALTH CARE EDUCATION/TRAINING PROGRAM

## 2020-01-13 PROCEDURE — 36415 COLL VENOUS BLD VENIPUNCTURE: CPT | Performed by: STUDENT IN AN ORGANIZED HEALTH CARE EDUCATION/TRAINING PROGRAM

## 2020-01-13 PROCEDURE — 63700000 HC SELF-ADMINISTRABLE DRUG: Performed by: STUDENT IN AN ORGANIZED HEALTH CARE EDUCATION/TRAINING PROGRAM

## 2020-01-13 PROCEDURE — 80048 BASIC METABOLIC PNL TOTAL CA: CPT | Performed by: STUDENT IN AN ORGANIZED HEALTH CARE EDUCATION/TRAINING PROGRAM

## 2020-01-13 PROCEDURE — 85025 COMPLETE CBC W/AUTO DIFF WBC: CPT | Performed by: STUDENT IN AN ORGANIZED HEALTH CARE EDUCATION/TRAINING PROGRAM

## 2020-01-13 RX ORDER — METFORMIN HYDROCHLORIDE 500 MG/1
500 TABLET ORAL
Qty: 30 TABLET | Refills: 0 | Status: SHIPPED | OUTPATIENT
Start: 2020-01-13 | End: 2020-02-12

## 2020-01-13 RX ORDER — INSULIN GLARGINE 100 [IU]/ML
7 INJECTION, SOLUTION SUBCUTANEOUS NIGHTLY
Qty: 5 PEN | Refills: 0 | Status: SHIPPED | OUTPATIENT
Start: 2020-01-13 | End: 2020-02-12

## 2020-01-13 RX ORDER — ISOPROPYL ALCOHOL 70 ML/100ML
SWAB TOPICAL
Qty: 30 EACH | Refills: 0 | Status: SHIPPED | OUTPATIENT
Start: 2020-01-13

## 2020-01-13 RX ORDER — PEN NEEDLE, DIABETIC 30 GX3/16"
NEEDLE, DISPOSABLE MISCELLANEOUS
Qty: 30 EACH | Refills: 0 | Status: SHIPPED | OUTPATIENT
Start: 2020-01-13

## 2020-01-13 RX ORDER — PREDNISONE 10 MG/1
TABLET ORAL
Qty: 30 TABLET | Refills: 0 | Status: SHIPPED | OUTPATIENT
Start: 2020-01-14 | End: 2020-01-18

## 2020-01-13 RX ORDER — LANCETS
EACH MISCELLANEOUS
Qty: 30 EACH | Refills: 0 | Status: SHIPPED | OUTPATIENT
Start: 2020-01-13

## 2020-01-13 RX ADMIN — CARVEDILOL 3.12 MG: 3.12 TABLET, FILM COATED ORAL at 08:50

## 2020-01-13 RX ADMIN — IPRATROPIUM BROMIDE AND ALBUTEROL SULFATE 3 ML: 2.5; .5 SOLUTION RESPIRATORY (INHALATION) at 09:28

## 2020-01-13 RX ADMIN — INSULIN ASPART 6 UNITS: 100 INJECTION, SOLUTION INTRAVENOUS; SUBCUTANEOUS at 08:55

## 2020-01-13 RX ADMIN — FINASTERIDE 5 MG: 5 TABLET, FILM COATED ORAL at 08:52

## 2020-01-13 RX ADMIN — AMLODIPINE BESYLATE 5 MG: 5 TABLET ORAL at 08:52

## 2020-01-13 RX ADMIN — FAMOTIDINE 40 MG: 20 TABLET, FILM COATED ORAL at 08:49

## 2020-01-13 RX ADMIN — PREDNISONE 40 MG: 20 TABLET ORAL at 08:51

## 2020-01-13 RX ADMIN — BUDESONIDE 0.5 MG: 0.5 INHALANT ORAL at 06:01

## 2020-01-13 RX ADMIN — CARVEDILOL 3.12 MG: 3.12 TABLET, FILM COATED ORAL at 17:43

## 2020-01-13 RX ADMIN — ATORVASTATIN CALCIUM 10 MG: 10 TABLET, FILM COATED ORAL at 08:52

## 2020-01-13 RX ADMIN — IPRATROPIUM BROMIDE AND ALBUTEROL SULFATE 3 ML: 2.5; .5 SOLUTION RESPIRATORY (INHALATION) at 13:28

## 2020-01-13 RX ADMIN — IPRATROPIUM BROMIDE AND ALBUTEROL SULFATE 3 ML: 2.5; .5 SOLUTION RESPIRATORY (INHALATION) at 02:07

## 2020-01-13 RX ADMIN — PANTOPRAZOLE SODIUM 20 MG: 20 TABLET, DELAYED RELEASE ORAL at 08:50

## 2020-01-13 RX ADMIN — DILTIAZEM HYDROCHLORIDE 120 MG: 120 CAPSULE, COATED, EXTENDED RELEASE ORAL at 08:50

## 2020-01-13 RX ADMIN — DOXYCYCLINE HYCLATE 100 MG: 100 TABLET, FILM COATED ORAL at 08:50

## 2020-01-13 RX ADMIN — IPRATROPIUM BROMIDE AND ALBUTEROL SULFATE 3 ML: 2.5; .5 SOLUTION RESPIRATORY (INHALATION) at 06:01

## 2020-01-13 RX ADMIN — GUAIFENESIN 600 MG: 600 TABLET ORAL at 08:51

## 2020-01-13 RX ADMIN — ESCITALOPRAM OXALATE 10 MG: 10 TABLET, FILM COATED ORAL at 08:52

## 2020-01-13 NOTE — PLAN OF CARE
Problem: Adult Inpatient Plan of Care  Goal: Plan of Care Review  Flowsheets (Taken 1/13/2020 3791)  Progress: improving  Plan of Care Reviewed With: other (see comments) (medical team)  Outcome Summary: As per medical rounds, patient stable for discharge today to home. Referral completed for SN/PT/OT services with [facility]

## 2020-01-13 NOTE — DISCHARGE SUMMARY
Internal Medicine  Inpatient Discharge Summary        BRIEF OVERVIEW   Admitting Provider: Bruno Eastman MD  Attending Provider: Micheal Elizalde MD Attending phys phone: [phone]    PCP: Bella Walters [phone]    Admission Date: 1/7/2020  Discharge Date: 1/13/2020     DISCHARGE DIAGNOSES      Primary Discharge Diagnosis  No Principal Problem: There is no principal problem currently on the Problem List. Please update the Problem List and refresh.    Secondary Discharge Diagnoses  Active Hospital Problems    Diagnosis Date Noted   • Acute on chronic respiratory failure with hypoxia and hypercapnia (CMS/HCC) 01/07/2020     Priority: High   • Encephalopathy 01/07/2020     Priority: Medium   • Steroid-induced hyperglycemia 01/07/2020     Priority: Low   • Shortness of breath 01/07/2020   • History of prostate cancer 12/23/2019   • GERD (gastroesophageal reflux disease) 12/23/2019   • Hyperlipidemia 12/23/2019   • Hypertension 04/11/2019      Resolved Hospital Problems    Diagnosis Date Noted Date Resolved   • Lower extremity edema 01/07/2020 01/09/2020     Priority: Medium   • Acute kidney injury superimposed on chronic kidney disease (CMS/HCC) 04/11/2019 01/13/2020     Priority: Low       Active Problem List on Day of Discharge  Patient Active Problem List   Diagnosis   • COPD exacerbation (CMS/HCC)   • Abdominal pain   • Hypertension   • Hyperlipidemia   • History of prostate cancer   • GERD (gastroesophageal reflux disease)   • Shortness of breath   • Acute on chronic respiratory failure with hypoxia and hypercapnia (CMS/HCC)   • Encephalopathy   • Steroid-induced hyperglycemia     SUMMARY OF HOSPITALIZATION      Presenting Problem/History of Present Illness  Shortness of breath    This is a 84 y.o. year-old male admitted on 1/7/2020 with Shortness of breath [R06.02]  Acute delirium [R41.0]  COPD exacerbation (CMS/HCC) [J44.1]  Bilateral leg edema [R60.0].    Hospital Course  For full admission  details, please refer to H&P from 1/7/2020.  In brief, this an 83 yo M w/ a PMHx of COPD on home 2 L NC, HTN and CKD who presents to [facility] w/ acute on chronic hypercapneic hypoxic respiratory failure.  Patient was brought into the ER by his family due to concern for worsening respiratory distress and confusion two days after a hospital course for a COPD exacerbation.  He was evaluated by pulmonology in the ER, and IV methylpred was initiated.  His ER, a VBG was revealing for 7.29/73/28, and he was consequently placed on BiPAP therapy.  By the following day, his mentation drastically improved, and his repeat ABG was revealing for 7.40/56/151, and was qualified for BiPAP therapy upon discharge. During his hospital stay, he received a 7 day course of doxycycline, and his IV steroids were transitioned to PO prednisone on 1/11.  His hospital course was c/b by steroid-induced hyperglycemia, requiring standing basal and bolus insulin, which improved as steroids were tapered.  He was seen by diabetes education during his hospital stay, and received a glucometer.  By the day of discharge, the patient's symptoms improved, his vital signs remained stable, and he was therefore deemed medically safe to leave the hospital with instructions for close follow up with his outpatient specialists.    Exam on Day of Discharge  Physical Exam   General: Non-toxic appearing   HEENT:  moist mucus membranes  No JVD   Eyes: Conjunctiva clear, PERRLA   Cardiac: RRR  No murmurs, rubs, or gallops   Pulmonary: Bibasilar crackles, less end-expiratory wheezes, improved air movement   Abdomen: Soft, non-tender, +BS  No rebound, no guarding         Extremities: B/L +1 pitting edema L > R, improved since admission   Neuro: AAO x 3, answers questions appropriately, speaks conversationally   Psych: Appropriate, cooperative   Skin: Dry, onymycosis      Acute on chronic respiratory failure with hypoxia and hypercapnia (CMS/HCC)  Assessment & Plan  Patient  presents with increased oxygen requirements and a pCO2 of 73 compared to pCO2 of 53 from 12/2019 admission  Dx favorable for increased CO2 retention 2/2 COPD flare   S/p 7 day course of doxycycline  - discharged on prednisone taper  - budesonide nebs q 12 hours and PRN duonebs  - montelukast  - BiPAP QHS and PRN  - will need to follow up with pulmonology as an outpatient    Encephalopathy  Assessment & Plan  Dx most favorable for 2/2 acute on chronic hypercapnea, as resolved after BiPAP use  - plan to go home with BiPAP 18/8    Steroid-induced hyperglycemia  Assessment & Plan  1/7/2020 Hemoglobin A1c 7.8%   Steroid-induced hyperglycemia improving with decreased steroids  - will discharge with metformin 500 mg and insulin glargine 7 units QHS  - patient trained to use accuchecks and insulin injections    GERD (gastroesophageal reflux disease)  Assessment & Plan  - PPI and H2 blocker    History of prostate cancer  Assessment & Plan  - finasteride, tamsulosin    Hyperlipidemia  Assessment & Plan  - atorvastatin    Hypertension  Assessment & Plan  - amlodipine, diltiazem and carvedilol       Consults During Admission  IP CONSULT TO PULMONOLOGY/SLEEP MEDICINE  IP CONSULT TO DIABETES EDUCATION    DISCHARGE MEDICATIONS   New, changed, or stopped medications from this admission:       Medication List      START taking these medications    alcohol swabs pads, medicated  Use 1 pad each time you check your blood sugar     blood sugar diagnostic strip  Commonly known as:  ACCU-CHEK NEIL PLUS TEST STRP  Please use 1 strip each time you check your blood sugar     insulin glargine 100 unit/mL (3 mL) subcutaneous pen  Commonly known as:  LANTUS SOLOSTAR  Inject 7 Units under the skin nightly.  Dose:  7 Units     lancets 26 gauge misc  Commonly known as:  LANCETS,ULTRA THIN  Please use 1 lancet each time you check your blood sugar     metFORMIN 500 mg tablet  Commonly known as:  GLUCOPHAGE  Take 1 tablet (500 mg total) by mouth  "daily with breakfast.  Dose:  500 mg     pen needle, diabetic 32 gauge x 5/32\" needle  Commonly known as:  BD ULTRA-FINE KHURRAM PEN NEEDLE  Please use 1 pen each time you inject yourself with insulin        CHANGE how you take these medications    predniSONE 10 mg tablet  Commonly known as:  DELTASONE  Start taking on:  January 14, 2020  Please take prednisone taper as outlined in discharge instructions  What changed:  additional instructions        CONTINUE taking these medications    * albuterol 2.5 mg /3 mL (0.083 %) nebulizer solution  Take 2.5 mg by nebulization every 8 (eight) hours as needed for wheezing or shortness of breath.  Dose:  2.5 mg     * albuterol HFA 90 mcg/actuation inhaler  Commonly known as:  VENTOLIN HFA  Inhale 2 puffs every 4 (four) hours as needed for wheezing.  Dose:  2 puff     alendronate 70 mg tablet  Commonly known as:  FOSAMAX  Take 70 mg by mouth once a week on Friday.  Dose:  70 mg     amLODIPine 5 mg tablet  Commonly known as:  NORVASC  Take 5 mg by mouth daily.  Dose:  5 mg     atorvastatin 10 mg tablet  Commonly known as:  LIPITOR  Take 10 mg by mouth daily.  Dose:  10 mg     budesonide 0.5 mg/2 mL nebulizer solution  Commonly known as:  PULMICORT  Take 0.5 mg by nebulization 2 (two) times a day. Rinse mouth with water after use to reduce aftertaste and incidence of candidiasis. Do not swallow.  Dose:  0.5 mg     budesonide-formoterol 160-4.5 mcg/actuation inhaler  Commonly known as:  SYMBICORT  Inhale 2 puffs 2 (two) times a day as needed (For use when not around a nebulizer). Rinse mouth with water after use to reduce aftertaste and incidence of candidiasis. Do not swallow.  Dose:  2 puff     carvedilol 3.125 mg tablet  Commonly known as:  COREG  Take 3.125 mg by mouth 2 (two) times a day with meals.  Dose:  3.125 mg     chlorhexidine 0.12 % solution  Commonly known as:  PERIDEX  Swish and spit 15 mL 2 (two) times a day.  Dose:  15 mL     dilTIAZem  mg 24 hr " capsule  Commonly known as:  CARDIZEM CD  Take 120 mg by mouth daily.  Dose:  120 mg     escitalopram 10 mg tablet  Commonly known as:  LEXAPRO  Take 10 mg by mouth daily.  Dose:  10 mg     famotidine 40 mg tablet  Commonly known as:  PEPCID  Take 40 mg by mouth 2 times daily.  Dose:  40 mg     fexofenadine 180 mg tablet  Commonly known as:  ALLEGRA  Take 180 mg by mouth daily.  Dose:  180 mg     finasteride 5 mg tablet  Commonly known as:  PROSCAR  Take 5 mg by mouth daily.  Dose:  5 mg     formoterol 20 mcg/2 mL nebulizer solution  Commonly known as:  PERFOROMIST  Take 20 mcg by nebulization 2 (two) times a day.  Dose:  20 mcg     guaiFENesin 600 mg 12 hr tablet  Commonly known as:  MUCINEX  Take 1 tablet (600 mg total) by mouth 2 (two) times a day for 10 days.  Dose:  600 mg     LORazepam 0.5 mg tablet  Commonly known as:  ATIVAN  Take 0.5 mg by mouth daily as needed for anxiety.  Dose:  0.5 mg     montelukast 10 mg tablet  Commonly known as:  SINGULAIR  Take 10 mg by mouth nightly.  Dose:  10 mg     omeprazole 20 mg capsule  Commonly known as:  PriLOSEC  Take 20 mg by mouth daily before breakfast.  Dose:  20 mg     tamsulosin 0.4 mg capsule  Commonly known as:  FLOMAX  Take 0.4 mg by mouth 2 times daily.  Dose:  0.4 mg     tiotropium bromide 2.5 mcg/actuation mist inhaler  Commonly known as:  SPIRIVA RESPIMAT  Inhale 2 puffs daily.  Dose:  2 puff         * This list has 2 medication(s) that are the same as other medications prescribed for you. Read the directions carefully, and ask your doctor or other care provider to review them with you.                    PROCEDURES / LABS / IMAGING        Pertinent Labs  Lab Results   Component Value Date    WBC 13.45 (H) 01/13/2020    HGB 13.2 (L) 01/13/2020    HCT 39.4 (L) 01/13/2020    MCV 95.2 01/13/2020     (L) 01/13/2020     Lab Results   Component Value Date    GLUCOSE 141 (H) 01/13/2020    CALCIUM 8.5 (L) 01/13/2020     01/13/2020    K 4.5 01/13/2020     CO2 29 01/13/2020    CL 99 01/13/2020    BUN 33 (H) 01/13/2020    CREATININE 1.0 01/13/2020     Lab Results   Component Value Date    HGBA1C 7.8 (H) 01/07/2020     ABG Results       01/08/20 01/07/20 12/23/19                    0842 1813 1154         pH 7.40 7.29 --         pCO2 56 73 --         pO2 151 28 --         HCO3 34.7 35.1 --         Base Excess 7.8 5.4 --         Source Of Oxygen BIPAP nc bipap                       Pertinent Imaging  X-ray Chest 2 Views    Result Date: 1/7/2020  IMPRESSION: No definite active disease.    X-ray Chest 2 Views    Result Date: 12/30/2019  IMPRESSION: No acute cardiopulmonary findings. COMMENT: The current two view examination of the chest was compared to that dated 12/27/2019. The lungs remain free of active infiltration and congestion.  There is chronic right upper lobe pleural-parenchymal scarring. No new atelectasis or pleural effusions are present. The lungs remain hyperexpanded with changes of COPD. The cardiomediastinal silhouette is within normal limits of size and configuration. The bony thorax remains unchanged.  A mid dorsal vertebral body compression fracture remains unchanged since 4/11/2019.    Ct Chest Without Iv Contrast    Result Date: 1/9/2020  IMPRESSION: As compared to the April 2019 CT exam there has been interval development of an area of new somewhat 'clustered' patchy pulmonary consolidative opacities in the right upper lobe.  Additional right upper lobe new 8mm pulmonary nodular density.  Given the clinical history these findings are most likely on the basis of infectious/inflammatory process/pneumonia.. However, given the nodular configuration of at least a portion of this finding radiographic follow-up to document resolution is suggested as additional etiologies including malignancy are not excluded..    X-ray Chest 1 View    Result Date: 1/9/2020  IMPRESSION: No acute cardiopulmonary findings. COMMENT: The current portable study of the chest was  compared to that dated 1/7/2020 and 12/30/2019. Chronic right apical pleural-parenchymal scarring remains unchanged.  There is no acute pulmonary vascular congestion or edema.  No new volume loss or pleural effusions have developed. There is no cardiac enlargement.  No hilar or mediastinal adenopathy is seen.    X-ray Chest 1 View    Result Date: 12/27/2019  IMPRESSION: No definite evidence of consolidative airspace opacity to suggest pneumonia.     X-ray Chest 1 View    Result Date: 12/23/2019  IMPRESSION: No definite lung consolidation.    Us Venous Leg, Bilateral    Result Date: 1/7/2020  IMPRESSION: No evidence for right or left - sided deep venous thrombosis.      OUTPATIENT  FOLLOW-UP / REFERRALS / PENDING TESTS        Important Issues to Address in Follow-Up  - follow up with PCP within 1-2 weeks of discharge  - follow up with pulmonologist  DISCHARGE DISPOSITION      Disposition: Home Health Care - [facility]    Code Status At Discharge: Full Code         ATTENDING DOCUMENTATION  ALSO SEE ATTENDING ATTESTATION SECTION OF NOTE

## 2020-01-13 NOTE — PLAN OF CARE
Problem: Adult Inpatient Plan of Care  Goal: Plan of Care Review  Outcome: Progressing  Flowsheets (Taken 1/13/2020 9377)  Progress: improving  Plan of Care Reviewed With: patient  Outcome Summary: pt to d/c home     Problem: Adult Inpatient Plan of Care  Goal: Patient-Specific Goal (Individualization)  Outcome: Progressing

## 2020-01-13 NOTE — PLAN OF CARE
Problem: Adult Inpatient Plan of Care  Goal: Plan of Care Review  Flowsheets (Taken 1/13/2020 0153)  Progress: improving  Plan of Care Reviewed With: patient  Outcome Summary: Pt verbalizes understanding of plan of care; Pt continues to receive mini nebs as ordered; pt resting comfortably c Bipap  Goal: Patient-Specific Goal (Individualization)  Flowsheets (Taken 1/13/2020 0153)  Patient-Specific Goals (Include Timeframe): increase activity as tolerated  Individualized Care Needs: assess O2 needs; Bipap at night  Anxieties, Fears or Concerns: anxiety about shortness of breath c exertion

## 2020-01-13 NOTE — PATIENT CARE CONFERENCE
Care Progression Rounds Note  Date: 1/13/2020  Time: 11:57 AM     Patient Name: Zuhair Luong Jr     Medical Record Number: [id number]   YOB: 1935  Sex: Male      Room/Bed: 0148    Admitting Diagnosis: Shortness of breath [R06.02]  Acute delirium [R41.0]  COPD exacerbation (CMS/HCC) [J44.1]  Bilateral leg edema [R60.0]   Admit Date/Time: 1/7/2020  3:51 PM    Primary Diagnosis: No Principal Problem: There is no principal problem currently on the Problem List. Please update the Problem List and refresh.  Principal Problem: No Principal Problem: There is no principal problem currently on the Problem List. Please update the Problem List and refresh.    GMLOS: 3.6  Anticipated Discharge Date: 1/13/2020    AM-PAC  Mobility Score: 20    Discharge Planning:  Living Arrangements: house  Anticipated Discharge Disposition: home with home health services    Barriers to Discharge:  Barriers to Discharge: None    Participants:  physical therapy, nursing, , physician, social work/services

## 2020-01-13 NOTE — DISCHARGE INSTRUCTIONS
Listed below are the following reasons why you were hospitalized, and instructions to follow at discharge:    1. Hypercapnia (Build up of Carbon Dioxide)  You came to the hospital with increased shortness of breath and confusion.  In the Emergency Department it was discovered that you were retaining carbon dioxide, a condition called hypercapnia.  This abnormality greatly improved when you were placed on the BiPAP machine, which also improved your breathing and confusion.  - A BiPAP machine is being delivered to your house.  Please use this machine every night when you go to sleep, as well as during the day if you take naps.    2. COPD exacerbation  You were treated for a COPD exacerbation during your hospital stay with steroids and regular nebulized treatments, and were seen by your pulmonologist.  - Please follow the steroid taper as outlined below. (Pills to be prescribed in increments of 10 mg)  January 14 - 40 mg (4 pills)  January 15 - 40 mg (4 pills)  January 16 - 40 mg (4 pills)  January 17 - 30 mg (3 pills)  January 18 - 30 mg (3 pills)  January 19 - 30 mg (3 pills)  January 20 - 20 mg (2 pills)  January 21 - 20 mg (2 pills)  January 22 - 20 mg (2 pills)  January 23 - 10 mg (1 pill)  January 24 - 10 mg (1 pill)  January 25 - 10 mg (1 pill)    - You will need to follow up with your pulmonologist as an outpatient.  Please call [phone] in order to schedule an appointment.    3. Diabetes Mellitus  You were diagnosed with diabetes mellitus during your hospital stay, and were seen by the diabetic educator.  Your blood sugar levels increased during your hospitalization, however the sugar levels will continue to come down as you taper steroids.  - You are being discharged with a prescription for pill called Metformin.  Having symptoms such as crampy abdominal pain or diarrhea are common side effects of this medication, however these symptoms are temporary, and should resolve within a week.  - You are being  discharged with a prescription for Insulin Glargine (LANTUS) to be injected at night time.  - Please check your blood sugar levels once a day.  - Please keep a log-book to write down your blood sugar readings.  Please bring this book with you to your primary care physician's office.  - Please call your doctor if your blood sugar is low, under 70, or high, above 300.      Please follow up with your PCP within 1-2 weeks of discharge.  Please bring your discharge instructions with you, and inform him/her of your hospital stay.  Please continue all medications as prescribed.  We wish you good health, and all of the best in your recovery!

## 2020-01-13 NOTE — NURSING NOTE
Pt assessed. Pt AAOx3. HR 89.  Pt does not report of any pain.  Pt on 3 L NC.  Evening medications administered.  Bed in lowest position, wheels locked, call bell within reach.  Will continue to monitor.

## 2020-01-13 NOTE — PROGRESS NOTES
" Pulmonary Progress Note       SUBJECTIVE   No acute events overnight. AAOx3, improved shortness of breath, wheezing resolved. No fevers or chills.      OBJECTIVE        Vital Signs:   Visit Vitals  BP (!) 152/73 (BP Location: Right upper arm, Patient Position: Lying)   Pulse (!) 103   Temp 36.6 °C (97.8 °F) (Oral)   Resp 18   Ht 1.626 m (5' 4\")   Wt 75.8 kg (167 lb)   SpO2 95%   BMI 28.67 kg/m²       I/O's:    Intake/Output Summary (Last 24 hours) at 1/13/2020 1337  Last data filed at 1/13/2020 0900  Gross per 24 hour   Intake 240 ml   Output 400 ml   Net -160 ml       Medications:    Reviewed. Pertinent medications as below.    • amLODIPine  5 mg oral Daily   • atorvastatin  10 mg oral Daily   • budesonide  0.5 mg nebulization BID (6a, 6p)   • carvedilol  3.125 mg oral BID with meals   • cetirizine  10 mg oral Nightly   • dilTIAZem CD  120 mg oral Daily   • doxycycline hyclate  100 mg oral q12h ASAEL   • enoxaparin  40 mg subcutaneous Daily (6p)   • escitalopram  10 mg oral Daily   • famotidine  40 mg oral Daily   • finasteride  5 mg oral Daily   • guaiFENesin  600 mg oral BID   • insulin aspart U-100  3-5 Units subcutaneous With meals & nightly   • insulin glargine  15 Units subcutaneous Nightly   • ipratropium-albuterol  3 mL nebulization q4h ASAEL   • montelukast  10 mg oral Nightly   • pantoprazole  20 mg oral Daily   • predniSONE  40 mg oral Daily   • tamsulosin  0.4 mg oral Nightly       Anti-infectives (From admission, onward)    Start     Dose/Rate Route Frequency Ordered Stop    01/07/20 2125  doxycycline hyclate (VIBRA-TABS) tablet 100 mg      100 mg oral Every 12 hours 01/07/20 2121            PHYSICAL EXAMINATION        General: The patient is in no acute distress.  Resting comfortably in bed.   HEENT: Mucous membranes are moist.  Sclera are anicteric.  Neck: Supple.  No cervical lymphadenopathy  Cardiovascular: S1 and S2 are present.  There are no murmurs.  Lungs: Decreased breath sounds, end expiratory " wheezes   Abdomen: Soft, nontender and nondistended  Extremities: Cool and dry with bilateral L>R pitting edema   Neuro: Awake and alert.  Spontaneously moving all extremities      Diagnostic Data      Labs:    I have personally reviewed all pertinent patient laboratory results. Labs of note discussed below:    ABG Results       01/08/20 01/07/20 12/23/19                    0842 1813 1154         pH 7.40 7.29 --         pCO2 56 73 --         pO2 151 28 --         HCO3 34.7 35.1 --         Base Excess 7.8 5.4 --         Source Of Oxygen BIPAP nc bipap                     CMP Results       01/09/20 01/09/20 01/08/20                    1354 1137 0844          135 137         K 4.9 6.2 4.6         Cl 99 102 98         CO2 29 21 29         Glucose 272 276 223         BUN 42 44 32         Creatinine 1.4 1.4 1.2         Calcium 8.3 8.3 8.0         Anion Gap 9 12 10         EGFR 48.5 48.5 58.0         Comment for K at 1137 on 01/09/20:    MODERATE HEMOLYSIS, RESULT MAY BE INCREASED.        CBC Results       01/09/20 01/08/20 01/07/20                    1137 0844 1442         WBC 21.23 17.87 19.81         RBC 4.94 4.65 4.82         HGB 15.7 14.9 15.5         HCT 48.1 43.5 45.8         MCV 97.4 93.5 95.0         MCH 31.8 32.0 32.2         MCHC 32.6 34.3 33.8          180 210         Comment for PLT at 1137 on 01/09/20:  PLT CLUMPING SUSPECTED. PLT COUNT MAY BE SLIGHTLY HIGHER THAN REPORTED. IF CLINICALLY WARRANTED, SUGGEST COLLECTING SODIUM CITRATE TUBE (BLUE TOP) IN ADDITION TO EDTA TUBE FOR FOLLOW UP CBC TESTING.            maging:    I have reviewed all pertinent imaging which is discussed below.    No new imaging      ASSESSMENT AND PLAN      This is an 84 year old male with PMHx of COPD/emphysema with frequent exacerations, HTN who presented with shortness of breath and delirium.      1. COPD exacerbation with RUL infiltrate- Recently admitted for COPD exacerbation that was very slow to recover. He was  discharged 2 days prior to admission on 2LNC on a prednisone taper and was doing well. The night prior to admission, he was noted to have increased shortness of breath and productive cough with yellow sputum. He had started his taper and was on 30mg of prednisone daily at this point. His chest x-ray is clear without infiltrates, edema, or effusions. He has wheezing primarily in his upper lobes with rales in bilateral lower lobes.   - Evidence of hypercapnia on VBG on admission. Placed on BiLevel PAP     2. Lower extremity edema - New onset bilateral L>R lower extremity edema which may be related to steroids, but cannot rule out new onset CHF. His last TTE was normal with EF: 70%. His albumin is low at 2.6 which may also be contributing.   - LE dopplers negative for DVT   - BNP slightly elevated at 103      3. Altered mental status - resolved as hypercapnia improved    Recommendations:   - Oxygen supplementation to keep spO2 88-92%  - Prednisone 40mg oral daily x3 days and taper by 10mg q 3 days until off.   - Auto-Bi-PAP qHS has been arranged for home   - Discontinue antibiotics   - Albuterolq4 hours  - Budesonide BID   - Continue mucinex  - Outpatient follow-up with Dr. Montano     Please page [phone] with any questions/concerns.         Rubén Copeland,   1/13/2020  Pulmonary & Critical Care Fellow  PGY-5

## 2020-01-13 NOTE — PROGRESS NOTES
Patient: Zuhair Luong   Location: [place] [address]  MRN: [id number]  Today's date: 1/13/2020    Attempted to see patient for therapy. Unable due to patient refused.  Pt declined PT session at this time due to pending d/c.  Denies any needs or concerns in relation to PT.

## 2020-02-03 ENCOUNTER — TRANSCRIBE ORDERS (OUTPATIENT)
Dept: LAB | Facility: HOSPITAL | Age: 85
End: 2020-02-03

## 2020-02-03 ENCOUNTER — APPOINTMENT (OUTPATIENT)
Dept: LAB | Facility: HOSPITAL | Age: 85
End: 2020-02-03
Attending: INTERNAL MEDICINE
Payer: COMMERCIAL

## 2020-02-03 DIAGNOSIS — R30.0 DYSURIA: Primary | ICD-10-CM

## 2020-02-03 DIAGNOSIS — R30.0 DYSURIA: ICD-10-CM

## 2020-02-03 PROCEDURE — 87086 URINE CULTURE/COLONY COUNT: CPT

## 2020-02-04 LAB
BACTERIA UR CULT: NORMAL
BACTERIA UR CULT: NORMAL

## 2020-02-06 ENCOUNTER — TRANSCRIBE ORDERS (OUTPATIENT)
Dept: LAB | Facility: HOSPITAL | Age: 85
End: 2020-02-06

## 2020-02-06 ENCOUNTER — APPOINTMENT (OUTPATIENT)
Dept: LAB | Facility: HOSPITAL | Age: 85
End: 2020-02-06
Attending: INTERNAL MEDICINE
Payer: COMMERCIAL

## 2020-02-06 DIAGNOSIS — R30.0 DYSURIA: Primary | ICD-10-CM

## 2020-02-06 DIAGNOSIS — R30.0 DYSURIA: ICD-10-CM

## 2020-02-06 LAB
BILIRUB UR QL STRIP.AUTO: NEGATIVE MG/DL
CLARITY UR REFRACT.AUTO: CLEAR
COLOR UR AUTO: YELLOW
GLUCOSE UR STRIP.AUTO-MCNC: NEGATIVE MG/DL
HGB UR QL STRIP.AUTO: NEGATIVE
KETONES UR STRIP.AUTO-MCNC: NEGATIVE MG/DL
LEUKOCYTE ESTERASE UR QL STRIP.AUTO: NEGATIVE
NITRITE UR QL STRIP.AUTO: NEGATIVE
PH UR STRIP.AUTO: 7.5 [PH]
PROT UR QL STRIP.AUTO: NEGATIVE
SP GR UR REFRACT.AUTO: 1.01
UROBILINOGEN UR STRIP-ACNC: 0.2 EU/DL

## 2020-02-06 PROCEDURE — 81003 URINALYSIS AUTO W/O SCOPE: CPT

## 2020-04-03 ENCOUNTER — HOSPITAL ENCOUNTER (OUTPATIENT)
Dept: RADIOLOGY | Facility: HOSPITAL | Age: 85
Discharge: HOME | End: 2020-04-03
Attending: INTERNAL MEDICINE
Payer: COMMERCIAL

## 2020-04-03 DIAGNOSIS — R91.8 OTHER NONSPECIFIC ABNORMAL FINDING OF LUNG FIELD: ICD-10-CM

## 2020-04-03 PROCEDURE — 71250 CT THORAX DX C-: CPT

## 2020-08-12 ENCOUNTER — TRANSCRIBE ORDERS (OUTPATIENT)
Dept: SCHEDULING | Age: 85
End: 2020-08-12

## 2020-08-12 DIAGNOSIS — R91.1 SOLITARY PULMONARY NODULE: Primary | ICD-10-CM

## 2020-11-05 ENCOUNTER — HOSPITAL ENCOUNTER (OUTPATIENT)
Dept: RADIOLOGY | Facility: HOSPITAL | Age: 85
Discharge: HOME | End: 2020-11-05
Attending: INTERNAL MEDICINE
Payer: COMMERCIAL

## 2020-11-05 DIAGNOSIS — R91.1 SOLITARY PULMONARY NODULE: ICD-10-CM

## 2020-11-05 PROCEDURE — 71250 CT THORAX DX C-: CPT | Mod: MG

## 2020-11-13 ENCOUNTER — TRANSCRIBE ORDERS (OUTPATIENT)
Dept: SCHEDULING | Age: 85
End: 2020-11-13

## 2020-11-13 DIAGNOSIS — R91.8 OTHER NONSPECIFIC ABNORMAL FINDING OF LUNG FIELD: Primary | ICD-10-CM

## 2021-01-13 ENCOUNTER — TRANSCRIBE ORDERS (OUTPATIENT)
Dept: SCHEDULING | Age: 86
End: 2021-01-13

## 2021-01-13 DIAGNOSIS — J96.21 ACUTE AND CHRONIC RESPIRATORY FAILURE WITH HYPOXIA (CMS/HCC): Primary | ICD-10-CM

## 2021-01-26 ENCOUNTER — HOSPITAL ENCOUNTER (OUTPATIENT)
Dept: RADIOLOGY | Facility: HOSPITAL | Age: 86
Discharge: HOME | End: 2021-01-26
Attending: INTERNAL MEDICINE
Payer: COMMERCIAL

## 2021-01-26 DIAGNOSIS — R91.8 OTHER NONSPECIFIC ABNORMAL FINDING OF LUNG FIELD: ICD-10-CM

## 2021-01-26 PROCEDURE — 71250 CT THORAX DX C-: CPT

## 2021-02-03 ENCOUNTER — HOSPITAL ENCOUNTER (OUTPATIENT)
Dept: CARDIOLOGY | Facility: CLINIC | Age: 86
Discharge: HOME | End: 2021-02-03
Attending: INTERNAL MEDICINE
Payer: COMMERCIAL

## 2021-02-03 VITALS
DIASTOLIC BLOOD PRESSURE: 73 MMHG | SYSTOLIC BLOOD PRESSURE: 152 MMHG | WEIGHT: 190 LBS | HEIGHT: 64 IN | BODY MASS INDEX: 32.44 KG/M2

## 2021-02-03 DIAGNOSIS — J96.21 ACUTE AND CHRONIC RESPIRATORY FAILURE WITH HYPOXIA (CMS/HCC): ICD-10-CM

## 2021-02-03 LAB
AORTIC ROOT ANNULUS: 3.2 CM
AV PEAK GRADIENT: 9 MMHG
AV PEAK VELOCITY-S: 1.49 M/S
AV VALVE AREA: 2 CM2
BSA FOR ECHO PROCEDURE: 1.97 M2
E WAVE DECELERATION TIME: 194 MS
E/A RATIO: 0.8
E/E' RATIO: 13.9
E/LAT E' RATIO: 13.7
EDV (BP): 49.2 CM3
EF (A4C): 53.1 %
EF A2C: 49.3 %
EJECTION FRACTION: 53.5 %
ESV (BP): 22.9 CM3
FRACTIONAL SHORTENING: 38.8 %
INTERVENTRICULAR SEPTUM: 0.79 CM
LA ESV (BP): 38.3 CM3
LA ESV INDEX (A2C): 15.43 CM3/M2
LA ESV INDEX (BP): 19.44 CM3/M2
LA/AORTA RATIO: 1.03
LAAS-AP2: 13.2 CM2
LAAS-AP4: 17 CM2
LAD 2D: 3.3 CM
LALD A4C: 4.66 CM
LALD A4C: 5.79 CM
LAV-S: 30.4 CM3
LEFT ATRIUM VOLUME INDEX: 19.8 CM3/M2
LEFT ATRIUM VOLUME: 39 CM3
LEFT INTERNAL DIMENSION IN SYSTOLE: 2.54 CM (ref 2.7–4.09)
LEFT VENTRICLE DIASTOLIC VOLUME INDEX: 25.48 CM3/M2
LEFT VENTRICLE DIASTOLIC VOLUME: 50.2 CM3
LEFT VENTRICLE SYSTOLIC VOLUME INDEX: 11.98 CM3/M2
LEFT VENTRICLE SYSTOLIC VOLUME: 23.6 CM3
LEFT VENTRICULAR INTERNAL DIMENSION IN DIASTOLE: 4.15 CM (ref 4.58–6.35)
LEFT VENTRICULAR POSTERIOR WALL IN END DIASTOLE: 0.75 CM (ref 0.6–1.11)
LV DIASTOLIC VOLUME: 42.7 CM3
LV ESV (APICAL 2 CHAMBER): 21.6 CM3
LVAD-AP2: 17.8 CM2
LVAD-AP4: 20.5 CM2
LVAS-AP2: 11.9 CM2
LVAS-AP4: 12.5 CM2
LVEDVI(A2C): 21.68 CM3/M2
LVEDVI(BP): 24.97 CM3/M2
LVESVI(A2C): 10.96 CM3/M2
LVESVI(BP): 11.62 CM3/M2
LVLD-AP2: 6.01 CM
LVLD-AP4: 6.86 CM
LVLS-AP2: 5.62 CM
LVLS-AP4: 5.88 CM
LVOT 2D: 1.9 CM
LVOT A: 2.84 CM2
LVOT PEAK VELOCITY: 1.05 M/S
MV E'TISSUE VEL-LAT: 0.07 M/S
MV E'TISSUE VEL-MED: 0.06 M/S
MV PEAK A VEL: 1.15 M/S
MV PEAK E VEL: 0.89 M/S
POSTERIOR WALL: 0.75 CM
PV PEAK GRADIENT: 3 MMHG
PV PV: 0.86 M/S
RAP: 10 MMHG
RVOT VMAX: 0.53 M/S
SEPTAL TISSUE DOPPLER FREE WALL LATE DIA VELOCITY (APICAL 4 CHAMBER VIEW): 0.1 M/S
TR MAX PG: 5 MMHG
TRICUSPID VALVE PEAK REGURGITATION VELOCITY: 1.15 M/S
Z-SCORE OF LEFT VENTRICULAR DIMENSION IN END DIASTOLE: -2.62
Z-SCORE OF LEFT VENTRICULAR DIMENSION IN END SYSTOLE: -2.12
Z-SCORE OF LEFT VENTRICULAR POSTERIOR WALL IN END DIASTOLE: -0.43

## 2021-02-03 PROCEDURE — 93306 TTE W/DOPPLER COMPLETE: CPT | Performed by: INTERNAL MEDICINE

## 2021-02-15 ENCOUNTER — TRANSCRIBE ORDERS (OUTPATIENT)
Dept: SCHEDULING | Age: 86
End: 2021-02-15

## 2021-02-15 ENCOUNTER — APPOINTMENT (OUTPATIENT)
Dept: LAB | Facility: HOSPITAL | Age: 86
End: 2021-02-15
Attending: INTERNAL MEDICINE
Payer: COMMERCIAL

## 2021-02-15 ENCOUNTER — TRANSCRIBE ORDERS (OUTPATIENT)
Dept: LAB | Facility: HOSPITAL | Age: 86
End: 2021-02-15

## 2021-02-15 DIAGNOSIS — J96.21 ACUTE AND CHRONIC RESPIRATORY FAILURE WITH HYPOXIA (CMS/HCC): ICD-10-CM

## 2021-02-15 DIAGNOSIS — J96.21 ACUTE AND CHRONIC RESPIRATORY FAILURE WITH HYPOXIA (CMS/HCC): Primary | ICD-10-CM

## 2021-02-15 DIAGNOSIS — R91.8 OTHER NONSPECIFIC ABNORMAL FINDING OF LUNG FIELD: Primary | ICD-10-CM

## 2021-02-15 LAB
ANION GAP SERPL CALC-SCNC: 15 MEQ/L (ref 3–15)
BUN SERPL-MCNC: 38 MG/DL (ref 8–20)
CALCIUM SERPL-MCNC: 10.7 MG/DL (ref 8.9–10.3)
CHLORIDE SERPL-SCNC: 97 MEQ/L (ref 98–109)
CO2 SERPL-SCNC: 29 MEQ/L (ref 22–32)
CREAT SERPL-MCNC: 1.9 MG/DL (ref 0.8–1.3)
GFR SERPL CREATININE-BSD FRML MDRD: 34 ML/MIN/1.73M*2
GLUCOSE SERPL-MCNC: 180 MG/DL (ref 70–99)
POTASSIUM SERPL-SCNC: 5.2 MEQ/L (ref 3.6–5.1)
SODIUM SERPL-SCNC: 141 MEQ/L (ref 136–144)

## 2021-02-15 PROCEDURE — 36415 COLL VENOUS BLD VENIPUNCTURE: CPT

## 2021-02-15 PROCEDURE — 80048 BASIC METABOLIC PNL TOTAL CA: CPT

## 2021-02-23 ENCOUNTER — TRANSCRIBE ORDERS (OUTPATIENT)
Dept: LAB | Facility: HOSPITAL | Age: 86
End: 2021-02-23

## 2021-02-23 ENCOUNTER — APPOINTMENT (OUTPATIENT)
Dept: LAB | Facility: HOSPITAL | Age: 86
End: 2021-02-23
Attending: NURSE PRACTITIONER
Payer: COMMERCIAL

## 2021-02-23 DIAGNOSIS — J44.1 CHRONIC OBSTRUCTIVE PULMONARY DISEASE WITH (ACUTE) EXACERBATION (CMS/HCC): ICD-10-CM

## 2021-02-23 DIAGNOSIS — J44.1 CHRONIC OBSTRUCTIVE PULMONARY DISEASE WITH (ACUTE) EXACERBATION (CMS/HCC): Primary | ICD-10-CM

## 2021-02-23 LAB
ALBUMIN SERPL-MCNC: 3.4 G/DL (ref 3.4–5)
ALP SERPL-CCNC: 46 IU/L (ref 35–126)
ALT SERPL-CCNC: 27 IU/L (ref 16–63)
ANION GAP SERPL CALC-SCNC: 12 MEQ/L (ref 3–15)
AST SERPL-CCNC: 15 IU/L (ref 15–41)
BASOPHILS # BLD: 0.07 K/UL (ref 0.01–0.1)
BASOPHILS NFR BLD: 0.6 %
BILIRUB SERPL-MCNC: 0.5 MG/DL (ref 0.3–1.2)
BUN SERPL-MCNC: 31 MG/DL (ref 8–20)
CALCIUM SERPL-MCNC: 9 MG/DL (ref 8.9–10.3)
CHLORIDE SERPL-SCNC: 97 MEQ/L (ref 98–109)
CO2 SERPL-SCNC: 29 MEQ/L (ref 22–32)
CREAT SERPL-MCNC: 1.5 MG/DL (ref 0.8–1.3)
DIFFERENTIAL METHOD BLD: ABNORMAL
EOSINOPHIL # BLD: 0.16 K/UL (ref 0.04–0.54)
EOSINOPHIL NFR BLD: 1.3 %
ERYTHROCYTE [DISTWIDTH] IN BLOOD BY AUTOMATED COUNT: 13.6 % (ref 11.6–14.4)
GFR SERPL CREATININE-BSD FRML MDRD: 44.7 ML/MIN/1.73M*2
GLUCOSE SERPL-MCNC: 233 MG/DL (ref 70–99)
HCT VFR BLDCO AUTO: 42 % (ref 40.1–51)
HGB BLD-MCNC: 13.7 G/DL (ref 13.7–17.5)
IMM GRANULOCYTES # BLD AUTO: 0.3 K/UL (ref 0–0.08)
IMM GRANULOCYTES NFR BLD AUTO: 2.5 %
LYMPHOCYTES # BLD: 0.27 K/UL (ref 1.2–3.5)
LYMPHOCYTES NFR BLD: 2.2 %
MCH RBC QN AUTO: 32.6 PG (ref 28–33.2)
MCHC RBC AUTO-ENTMCNC: 32.6 G/DL (ref 32.2–36.5)
MCV RBC AUTO: 100 FL (ref 83–98)
MONOCYTES # BLD: 0.3 K/UL (ref 0.3–1)
MONOCYTES NFR BLD: 2.5 %
NEUTROPHILS # BLD: 11.13 K/UL (ref 1.7–7)
NEUTS SEG NFR BLD: 90.9 %
NRBC BLD-RTO: 0 %
PDW BLD AUTO: 9.9 FL (ref 9.4–12.4)
PLATELET # BLD AUTO: 270 K/UL (ref 150–350)
POTASSIUM SERPL-SCNC: 5 MEQ/L (ref 3.6–5.1)
PROT SERPL-MCNC: 5.8 G/DL (ref 6–8.2)
RBC # BLD AUTO: 4.2 M/UL (ref 4.5–5.8)
SODIUM SERPL-SCNC: 138 MEQ/L (ref 136–144)
WBC # BLD AUTO: 12.23 K/UL (ref 3.8–10.5)

## 2021-02-23 PROCEDURE — 36415 COLL VENOUS BLD VENIPUNCTURE: CPT

## 2021-02-23 PROCEDURE — 85025 COMPLETE CBC W/AUTO DIFF WBC: CPT

## 2021-02-23 PROCEDURE — 80053 COMPREHEN METABOLIC PANEL: CPT

## 2021-04-05 ENCOUNTER — TRANSCRIBE ORDERS (OUTPATIENT)
Dept: SCHEDULING | Age: 86
End: 2021-04-05

## 2021-04-05 ENCOUNTER — TRANSCRIBE ORDERS (OUTPATIENT)
Dept: LAB | Facility: HOSPITAL | Age: 86
End: 2021-04-05

## 2021-04-05 ENCOUNTER — HOSPITAL ENCOUNTER (OUTPATIENT)
Dept: RADIOLOGY | Facility: HOSPITAL | Age: 86
Discharge: HOME | End: 2021-04-05
Attending: INTERNAL MEDICINE
Payer: COMMERCIAL

## 2021-04-05 ENCOUNTER — APPOINTMENT (OUTPATIENT)
Dept: LAB | Facility: HOSPITAL | Age: 86
End: 2021-04-05
Attending: INTERNAL MEDICINE
Payer: COMMERCIAL

## 2021-04-05 DIAGNOSIS — E78.5 HYPERLIPIDEMIA, UNSPECIFIED: Primary | ICD-10-CM

## 2021-04-05 DIAGNOSIS — R91.8 OTHER NONSPECIFIC ABNORMAL FINDING OF LUNG FIELD: Primary | ICD-10-CM

## 2021-04-05 DIAGNOSIS — E78.5 HYPERLIPIDEMIA, UNSPECIFIED: ICD-10-CM

## 2021-04-05 DIAGNOSIS — J96.22 ACUTE AND CHRONIC RESPIRATORY FAILURE WITH HYPERCAPNIA (CMS/HCC): Primary | ICD-10-CM

## 2021-04-05 DIAGNOSIS — R91.8 OTHER NONSPECIFIC ABNORMAL FINDING OF LUNG FIELD: ICD-10-CM

## 2021-04-05 DIAGNOSIS — J96.22 ACUTE AND CHRONIC RESPIRATORY FAILURE WITH HYPERCAPNIA (CMS/HCC): ICD-10-CM

## 2021-04-05 LAB
25(OH)D3 SERPL-MCNC: 38 NG/ML (ref 30–100)
ALBUMIN SERPL-MCNC: 3.7 G/DL (ref 3.4–5)
ALP SERPL-CCNC: 60 IU/L (ref 35–126)
ALT SERPL-CCNC: 25 IU/L (ref 16–63)
ANION GAP SERPL CALC-SCNC: 13 MEQ/L (ref 3–15)
AST SERPL-CCNC: 17 IU/L (ref 15–41)
BASE EXCESS BLDA CALC-SCNC: 8.9 MEQ/L
BASOPHILS # BLD: 0.06 K/UL (ref 0.01–0.1)
BASOPHILS NFR BLD: 0.3 %
BILIRUB SERPL-MCNC: 1.2 MG/DL (ref 0.3–1.2)
BUN SERPL-MCNC: 19 MG/DL (ref 8–20)
CALCIUM SERPL-MCNC: 9.3 MG/DL (ref 8.9–10.3)
CHLORIDE SERPL-SCNC: 94 MEQ/L (ref 98–109)
CHOLEST SERPL-MCNC: 177 MG/DL
CO2 SERPL-SCNC: 34 MEQ/L (ref 22–32)
CREAT SERPL-MCNC: 1.4 MG/DL (ref 0.8–1.3)
DIFFERENTIAL METHOD BLD: ABNORMAL
EOSINOPHIL # BLD: 0.05 K/UL (ref 0.04–0.54)
EOSINOPHIL NFR BLD: 0.3 %
ERYTHROCYTE [DISTWIDTH] IN BLOOD BY AUTOMATED COUNT: 13.1 % (ref 11.6–14.4)
FIO2 ON VENT: ABNORMAL %
GFR SERPL CREATININE-BSD FRML MDRD: 48.4 ML/MIN/1.73M*2
GLUCOSE SERPL-MCNC: 227 MG/DL (ref 70–99)
HCO3 BLDA-SCNC: 31.9 MEQ/L (ref 21–28)
HCT VFR BLDCO AUTO: 48.2 % (ref 40.1–51)
HDLC SERPL-MCNC: 68 MG/DL
HDLC SERPL: 2.6 {RATIO}
HGB BLD-MCNC: 15.5 G/DL (ref 13.7–17.5)
IMM GRANULOCYTES # BLD AUTO: 0.23 K/UL (ref 0–0.08)
IMM GRANULOCYTES NFR BLD AUTO: 1.3 %
INHALED O2 CONCENTRATION: ABNORMAL %
LDLC SERPL CALC-MCNC: 83 MG/DL
LYMPHOCYTES # BLD: 0.41 K/UL (ref 1.2–3.5)
LYMPHOCYTES NFR BLD: 2.3 %
MCH RBC QN AUTO: 33 PG (ref 28–33.2)
MCHC RBC AUTO-ENTMCNC: 32.2 G/DL (ref 32.2–36.5)
MCV RBC AUTO: 102.6 FL (ref 83–98)
MONOCYTES # BLD: 0.59 K/UL (ref 0.3–1)
MONOCYTES NFR BLD: 3.3 %
NEUTROPHILS # BLD: 16.73 K/UL (ref 1.7–7)
NEUTS SEG NFR BLD: 92.5 %
NONHDLC SERPL-MCNC: 109 MG/DL
NRBC BLD-RTO: 0 %
PCO2 BLDA: 83 MM HG (ref 35–48)
PDW BLD AUTO: 10.3 FL (ref 9.4–12.4)
PH BLDA: 7.28 [PH] (ref 7.35–7.45)
PLATELET # BLD AUTO: 237 K/UL (ref 150–350)
PO2 BLDA: 227 MM HG (ref 83–100)
POTASSIUM SERPL-SCNC: 4.9 MEQ/L (ref 3.6–5.1)
PROT SERPL-MCNC: 5.9 G/DL (ref 6–8.2)
RBC # BLD AUTO: 4.7 M/UL (ref 4.5–5.8)
SODIUM SERPL-SCNC: 141 MEQ/L (ref 136–144)
TRIGL SERPL-MCNC: 131 MG/DL (ref 30–149)
TSH SERPL DL<=0.05 MIU/L-ACNC: 1.09 MIU/L (ref 0.34–5.6)
WBC # BLD AUTO: 18.07 K/UL (ref 3.8–10.5)

## 2021-04-05 PROCEDURE — 80061 LIPID PANEL: CPT

## 2021-04-05 PROCEDURE — 85025 COMPLETE CBC W/AUTO DIFF WBC: CPT

## 2021-04-05 PROCEDURE — 80053 COMPREHEN METABOLIC PANEL: CPT

## 2021-04-05 PROCEDURE — 84443 ASSAY THYROID STIM HORMONE: CPT

## 2021-04-05 PROCEDURE — 71250 CT THORAX DX C-: CPT

## 2021-04-05 PROCEDURE — 36415 COLL VENOUS BLD VENIPUNCTURE: CPT

## 2021-04-05 PROCEDURE — 82803 BLOOD GASES ANY COMBINATION: CPT

## 2021-04-05 PROCEDURE — 82306 VITAMIN D 25 HYDROXY: CPT

## 2021-08-17 NOTE — PLAN OF CARE
Problem: Patient Care Overview  Goal: Individualization & Mutuality   04/12/19 0106   Individualization   Patient Specific Preferences prefers to be called Edward   Patient Specific Goals to be discharged home   Patient Specific Interventions care clustered       Problem: Chronic Obstructive Pulmonary Disease (Adult)  Goal: Signs and Symptoms of Listed Potential Problems Will be Absent, Minimized or Managed (Chronic Obstructive Pulmonary Disease)  Outcome: Ongoing (interventions implemented as appropriate)   04/12/19 0106   Chronic Obstructive Pulmonary Disease   Problems Assessed (Chronic Obstructive Pulmonary Disease (COPD)) all   Problems Present (Chronic Obstructive Pulmonary Disease (COPD)) respiratory compromise       Problem: Fall Risk (Adult)  Goal: Identify Related Risk Factors and Signs and Symptoms  Outcome: Ongoing (interventions implemented as appropriate)   04/12/19 0106   Fall Risk   Related Risk Factors (Fall Risk) environment unfamiliar   Signs and Symptoms (Fall Risk) presence of risk factors     Goal: Absence of Falls  Outcome: Ongoing (interventions implemented as appropriate)         DC instructions

## 2021-10-28 ENCOUNTER — TRANSCRIBE ORDERS (OUTPATIENT)
Dept: RADIOLOGY | Facility: HOSPITAL | Age: 86
End: 2021-10-28
Payer: COMMERCIAL

## 2021-10-28 ENCOUNTER — HOSPITAL ENCOUNTER (OUTPATIENT)
Dept: RADIOLOGY | Facility: HOSPITAL | Age: 86
Discharge: HOME | End: 2021-10-28
Attending: INTERNAL MEDICINE
Payer: COMMERCIAL

## 2021-10-28 DIAGNOSIS — J44.1 CHRONIC OBSTRUCTIVE PULMONARY DISEASE WITH (ACUTE) EXACERBATION (CMS/HCC): Primary | ICD-10-CM

## 2021-10-28 DIAGNOSIS — J44.1 CHRONIC OBSTRUCTIVE PULMONARY DISEASE WITH (ACUTE) EXACERBATION (CMS/HCC): ICD-10-CM

## 2021-10-28 PROCEDURE — 71046 X-RAY EXAM CHEST 2 VIEWS: CPT

## 2021-11-08 ENCOUNTER — HOSPITAL ENCOUNTER (OUTPATIENT)
Dept: RADIOLOGY | Age: 86
Discharge: HOME | End: 2021-11-08
Attending: INTERNAL MEDICINE
Payer: COMMERCIAL

## 2021-11-08 DIAGNOSIS — R91.8 OTHER NONSPECIFIC ABNORMAL FINDING OF LUNG FIELD: ICD-10-CM

## 2021-11-08 PROCEDURE — 71250 CT THORAX DX C-: CPT

## 2022-05-02 ENCOUNTER — APPOINTMENT (OUTPATIENT)
Dept: LAB | Facility: HOSPITAL | Age: 87
End: 2022-05-02
Attending: INTERNAL MEDICINE
Payer: COMMERCIAL

## 2022-05-02 ENCOUNTER — TRANSCRIBE ORDERS (OUTPATIENT)
Dept: LAB | Facility: HOSPITAL | Age: 87
End: 2022-05-02

## 2022-05-02 DIAGNOSIS — R35.89 OTHER POLYURIA: Primary | ICD-10-CM

## 2022-05-02 DIAGNOSIS — R35.89 OTHER POLYURIA: ICD-10-CM

## 2022-05-02 LAB
ALBUMIN SERPL-MCNC: 3.6 G/DL (ref 3.4–5)
ALP SERPL-CCNC: 59 IU/L (ref 35–126)
ALT SERPL-CCNC: 20 IU/L (ref 16–63)
ANION GAP SERPL CALC-SCNC: 11 MEQ/L (ref 3–15)
AST SERPL-CCNC: 14 IU/L (ref 15–41)
BILIRUB SERPL-MCNC: 0.8 MG/DL (ref 0.3–1.2)
BUN SERPL-MCNC: 26 MG/DL (ref 8–20)
CALCIUM SERPL-MCNC: 9.5 MG/DL (ref 8.9–10.3)
CHLORIDE SERPL-SCNC: 100 MEQ/L (ref 98–109)
CO2 SERPL-SCNC: 29 MEQ/L (ref 22–32)
CREAT SERPL-MCNC: 1.4 MG/DL (ref 0.8–1.3)
GFR SERPL CREATININE-BSD FRML MDRD: 48.3 ML/MIN/1.73M*2
GLUCOSE SERPL-MCNC: 133 MG/DL (ref 70–99)
POTASSIUM SERPL-SCNC: 5 MEQ/L (ref 3.6–5.1)
PROT SERPL-MCNC: 6 G/DL (ref 6–8.2)
SODIUM SERPL-SCNC: 140 MEQ/L (ref 136–144)

## 2022-05-02 PROCEDURE — 80053 COMPREHEN METABOLIC PANEL: CPT

## 2022-05-02 PROCEDURE — 83036 HEMOGLOBIN GLYCOSYLATED A1C: CPT

## 2022-05-02 PROCEDURE — 36415 COLL VENOUS BLD VENIPUNCTURE: CPT

## 2022-05-03 LAB
EST. AVERAGE GLUCOSE BLD GHB EST-MCNC: 194 MG/DL
HBA1C MFR BLD HPLC: 8.4 %

## 2022-05-31 ENCOUNTER — APPOINTMENT (OUTPATIENT)
Dept: LAB | Facility: HOSPITAL | Age: 87
End: 2022-05-31
Attending: INTERNAL MEDICINE
Payer: COMMERCIAL

## 2022-05-31 ENCOUNTER — TRANSCRIBE ORDERS (OUTPATIENT)
Dept: LAB | Facility: HOSPITAL | Age: 87
End: 2022-05-31

## 2022-05-31 ENCOUNTER — TRANSCRIBE ORDERS (OUTPATIENT)
Dept: RADIOLOGY | Facility: HOSPITAL | Age: 87
End: 2022-05-31

## 2022-05-31 ENCOUNTER — HOSPITAL ENCOUNTER (OUTPATIENT)
Dept: RADIOLOGY | Facility: HOSPITAL | Age: 87
Discharge: HOME | End: 2022-05-31
Attending: INTERNAL MEDICINE
Payer: COMMERCIAL

## 2022-05-31 DIAGNOSIS — R06.02 SHORTNESS OF BREATH: Primary | ICD-10-CM

## 2022-05-31 DIAGNOSIS — R32 UNSPECIFIED URINARY INCONTINENCE: ICD-10-CM

## 2022-05-31 DIAGNOSIS — R05.9 COUGH, UNSPECIFIED: ICD-10-CM

## 2022-05-31 DIAGNOSIS — R06.02 SHORTNESS OF BREATH: ICD-10-CM

## 2022-05-31 DIAGNOSIS — R05.9 COUGH, UNSPECIFIED: Primary | ICD-10-CM

## 2022-05-31 LAB
ALBUMIN SERPL-MCNC: 3.6 G/DL (ref 3.4–5)
ALP SERPL-CCNC: 75 IU/L (ref 35–126)
ALT SERPL-CCNC: 19 IU/L (ref 16–63)
ANION GAP SERPL CALC-SCNC: 13 MEQ/L (ref 3–15)
AST SERPL-CCNC: 19 IU/L (ref 15–41)
BACTERIA URNS QL MICRO: ABNORMAL /HPF
BASOPHILS # BLD: 0.09 K/UL (ref 0.01–0.1)
BASOPHILS NFR BLD: 0.8 %
BILIRUB SERPL-MCNC: 0.8 MG/DL (ref 0.3–1.2)
BILIRUB UR QL STRIP.AUTO: NEGATIVE MG/DL
BNP SERPL-MCNC: 48 PG/ML
BUN SERPL-MCNC: 25 MG/DL (ref 8–20)
CALCIUM SERPL-MCNC: 9.7 MG/DL (ref 8.9–10.3)
CHLORIDE SERPL-SCNC: 94 MEQ/L (ref 98–109)
CLARITY UR REFRACT.AUTO: CLEAR
CO2 SERPL-SCNC: 31 MEQ/L (ref 22–32)
COLOR UR AUTO: YELLOW
CREAT SERPL-MCNC: 1.4 MG/DL (ref 0.8–1.3)
DIFFERENTIAL METHOD BLD: ABNORMAL
EOSINOPHIL # BLD: 0.11 K/UL (ref 0.04–0.54)
EOSINOPHIL NFR BLD: 0.9 %
ERYTHROCYTE [DISTWIDTH] IN BLOOD BY AUTOMATED COUNT: 13.7 % (ref 11.6–14.4)
GFR SERPL CREATININE-BSD FRML MDRD: 48.3 ML/MIN/1.73M*2
GLUCOSE SERPL-MCNC: 254 MG/DL (ref 70–99)
GLUCOSE UR STRIP.AUTO-MCNC: ABNORMAL MG/DL
HCT VFR BLDCO AUTO: 43.9 % (ref 40.1–51)
HGB BLD-MCNC: 14.2 G/DL (ref 13.7–17.5)
HGB UR QL STRIP.AUTO: NEGATIVE
HYALINE CASTS #/AREA URNS LPF: ABNORMAL /LPF
IMM GRANULOCYTES # BLD AUTO: 0.3 K/UL (ref 0–0.08)
IMM GRANULOCYTES NFR BLD AUTO: 2.5 %
KETONES UR STRIP.AUTO-MCNC: NEGATIVE MG/DL
LEUKOCYTE ESTERASE UR QL STRIP.AUTO: ABNORMAL
LYMPHOCYTES # BLD: 0.54 K/UL (ref 1.2–3.5)
LYMPHOCYTES NFR BLD: 4.5 %
MAGNESIUM SERPL-MCNC: 2.2 MG/DL (ref 1.8–2.5)
MCH RBC QN AUTO: 31.3 PG (ref 28–33.2)
MCHC RBC AUTO-ENTMCNC: 32.3 G/DL (ref 32.2–36.5)
MCV RBC AUTO: 96.9 FL (ref 83–98)
MONOCYTES # BLD: 0.41 K/UL (ref 0.3–1)
MONOCYTES NFR BLD: 3.4 %
MUCOUS THREADS URNS QL MICRO: ABNORMAL /LPF
NEUTROPHILS # BLD: 10.47 K/UL (ref 1.7–7)
NEUTS SEG NFR BLD: 87.9 %
NITRITE UR QL STRIP.AUTO: NEGATIVE
NRBC BLD-RTO: 0 %
PDW BLD AUTO: 10 FL (ref 9.4–12.4)
PH UR STRIP.AUTO: 6 [PH]
PLATELET # BLD AUTO: 255 K/UL (ref 150–350)
POTASSIUM SERPL-SCNC: 5.7 MEQ/L (ref 3.6–5.1)
PROT SERPL-MCNC: 5.9 G/DL (ref 6–8.2)
PROT UR QL STRIP.AUTO: ABNORMAL
RBC # BLD AUTO: 4.53 M/UL (ref 4.5–5.8)
RBC #/AREA URNS HPF: ABNORMAL /HPF
SODIUM SERPL-SCNC: 138 MEQ/L (ref 136–144)
SP GR UR REFRACT.AUTO: 1.02
SQUAMOUS URNS QL MICRO: ABNORMAL /HPF
UROBILINOGEN UR STRIP-ACNC: 0.2 EU/DL
WBC # BLD AUTO: 11.92 K/UL (ref 3.8–10.5)
WBC #/AREA URNS HPF: ABNORMAL /HPF

## 2022-05-31 PROCEDURE — 80053 COMPREHEN METABOLIC PANEL: CPT

## 2022-05-31 PROCEDURE — 36415 COLL VENOUS BLD VENIPUNCTURE: CPT

## 2022-05-31 PROCEDURE — 71046 X-RAY EXAM CHEST 2 VIEWS: CPT

## 2022-05-31 PROCEDURE — 87086 URINE CULTURE/COLONY COUNT: CPT

## 2022-05-31 PROCEDURE — 83735 ASSAY OF MAGNESIUM: CPT

## 2022-05-31 PROCEDURE — 83880 ASSAY OF NATRIURETIC PEPTIDE: CPT

## 2022-05-31 PROCEDURE — 81003 URINALYSIS AUTO W/O SCOPE: CPT

## 2022-05-31 PROCEDURE — 85025 COMPLETE CBC W/AUTO DIFF WBC: CPT

## 2022-06-02 LAB
BACTERIA UR CULT: ABNORMAL
BACTERIA UR CULT: ABNORMAL

## 2022-07-13 ENCOUNTER — LAB REQUISITION (OUTPATIENT)
Dept: LAB | Facility: HOSPITAL | Age: 87
End: 2022-07-13
Attending: UROLOGY
Payer: COMMERCIAL

## 2022-07-13 DIAGNOSIS — R35.0 FREQUENCY OF MICTURITION: ICD-10-CM

## 2022-07-13 DIAGNOSIS — N39.41 URGE INCONTINENCE: ICD-10-CM

## 2022-07-13 LAB
BACTERIA #/AREA URNS HPF: 3 /HPF
BILIRUB UR QL STRIP.AUTO: ABNORMAL MG/DL
CLARITY UR REFRACT.AUTO: ABNORMAL
COLOR UR AUTO: ABNORMAL
GLUCOSE UR STRIP.AUTO-MCNC: ABNORMAL MG/DL
HGB UR QL STRIP.AUTO: ABNORMAL
HYALINE CASTS #/AREA URNS LPF: ABNORMAL /LPF
KETONES UR STRIP.AUTO-MCNC: ABNORMAL MG/DL
LEUKOCYTE ESTERASE UR QL STRIP.AUTO: ABNORMAL
NITRITE UR QL STRIP.AUTO: ABNORMAL
PH UR STRIP.AUTO: ABNORMAL [PH]
PROT UR QL STRIP.AUTO: ABNORMAL
RBC #/AREA URNS HPF: ABNORMAL /HPF
SP GR UR REFRACT.AUTO: 1.03
SQUAMOUS #/AREA URNS HPF: ABNORMAL /HPF
UROBILINOGEN UR STRIP-ACNC: ABNORMAL EU/DL
WBC #/AREA URNS HPF: ABNORMAL /HPF

## 2022-07-13 PROCEDURE — 87077 CULTURE AEROBIC IDENTIFY: CPT | Performed by: UROLOGY

## 2022-07-13 PROCEDURE — 81001 URINALYSIS AUTO W/SCOPE: CPT | Performed by: UROLOGY

## 2022-07-15 ENCOUNTER — TRANSCRIBE ORDERS (OUTPATIENT)
Dept: SCHEDULING | Age: 87
End: 2022-07-15

## 2022-07-15 DIAGNOSIS — N39.41 URGE INCONTINENCE: ICD-10-CM

## 2022-07-15 DIAGNOSIS — R35.0 FREQUENCY OF MICTURITION: Primary | ICD-10-CM

## 2022-07-16 LAB
BACTERIA UR CULT: ABNORMAL
BACTERIA UR CULT: ABNORMAL

## 2022-07-30 ENCOUNTER — HOSPITAL ENCOUNTER (OUTPATIENT)
Dept: RADIOLOGY | Facility: HOSPITAL | Age: 87
Discharge: HOME | End: 2022-07-30
Attending: UROLOGY
Payer: COMMERCIAL

## 2022-07-30 DIAGNOSIS — N39.41 URGE INCONTINENCE: ICD-10-CM

## 2022-07-30 DIAGNOSIS — R35.0 FREQUENCY OF MICTURITION: ICD-10-CM

## 2022-07-30 PROCEDURE — 76770 US EXAM ABDO BACK WALL COMP: CPT

## 2022-08-11 ENCOUNTER — TRANSCRIBE ORDERS (OUTPATIENT)
Dept: LAB | Facility: HOSPITAL | Age: 87
End: 2022-08-11

## 2022-08-11 ENCOUNTER — APPOINTMENT (OUTPATIENT)
Dept: LAB | Facility: HOSPITAL | Age: 87
End: 2022-08-11
Attending: INTERNAL MEDICINE
Payer: COMMERCIAL

## 2022-08-11 DIAGNOSIS — R06.00 DYSPNEA, UNSPECIFIED: ICD-10-CM

## 2022-08-11 DIAGNOSIS — R31.9 HEMATURIA, UNSPECIFIED: ICD-10-CM

## 2022-08-11 DIAGNOSIS — R31.9 HEMATURIA, UNSPECIFIED: Primary | ICD-10-CM

## 2022-08-11 LAB
BACTERIA URNS QL MICRO: ABNORMAL /HPF
BILIRUB UR QL STRIP.AUTO: NEGATIVE MG/DL
CLARITY UR REFRACT.AUTO: CLEAR
COLOR UR AUTO: YELLOW
GLUCOSE UR STRIP.AUTO-MCNC: NEGATIVE MG/DL
HGB UR QL STRIP.AUTO: NEGATIVE
HYALINE CASTS #/AREA URNS LPF: ABNORMAL /LPF
KETONES UR STRIP.AUTO-MCNC: NEGATIVE MG/DL
LEUKOCYTE ESTERASE UR QL STRIP.AUTO: NEGATIVE
MUCOUS THREADS URNS QL MICRO: ABNORMAL /LPF
NITRITE UR QL STRIP.AUTO: NEGATIVE
PH UR STRIP.AUTO: 6 [PH]
PROT UR QL STRIP.AUTO: ABNORMAL
RBC #/AREA URNS HPF: ABNORMAL /HPF
SP GR UR REFRACT.AUTO: 1.02
SQUAMOUS URNS QL MICRO: ABNORMAL /HPF
UROBILINOGEN UR STRIP-ACNC: 0.2 EU/DL
WBC #/AREA URNS HPF: ABNORMAL /HPF

## 2022-08-11 PROCEDURE — 81001 URINALYSIS AUTO W/SCOPE: CPT

## 2022-08-11 PROCEDURE — 81003 URINALYSIS AUTO W/O SCOPE: CPT

## 2022-08-11 PROCEDURE — 87086 URINE CULTURE/COLONY COUNT: CPT

## 2022-08-12 LAB — BACTERIA UR CULT: NORMAL

## 2022-11-01 ENCOUNTER — TRANSCRIBE ORDERS (OUTPATIENT)
Dept: LAB | Facility: HOSPITAL | Age: 87
End: 2022-11-01

## 2022-11-01 ENCOUNTER — APPOINTMENT (OUTPATIENT)
Dept: LAB | Facility: HOSPITAL | Age: 87
End: 2022-11-01
Attending: INTERNAL MEDICINE
Payer: COMMERCIAL

## 2022-11-01 DIAGNOSIS — E11.22 TYPE 2 DIABETES MELLITUS WITH DIABETIC CHRONIC KIDNEY DISEASE (CMS/HCC): ICD-10-CM

## 2022-11-01 DIAGNOSIS — E11.65 TYPE 2 DIABETES MELLITUS WITH HYPERGLYCEMIA (CMS/HCC): Primary | ICD-10-CM

## 2022-11-01 DIAGNOSIS — J43.9 EMPHYSEMA, UNSPECIFIED: ICD-10-CM

## 2022-11-01 DIAGNOSIS — I10 ESSENTIAL (PRIMARY) HYPERTENSION: ICD-10-CM

## 2022-11-01 DIAGNOSIS — E78.2 MIXED HYPERLIPIDEMIA: ICD-10-CM

## 2022-11-01 DIAGNOSIS — N18.30 CHRONIC KIDNEY DISEASE, STAGE 3 UNSPECIFIED (CMS/HCC): ICD-10-CM

## 2022-11-01 DIAGNOSIS — E11.65 TYPE 2 DIABETES MELLITUS WITH HYPERGLYCEMIA (CMS/HCC): ICD-10-CM

## 2022-11-01 LAB
ALBUMIN SERPL-MCNC: 3.6 G/DL (ref 3.4–5)
ALP SERPL-CCNC: 60 IU/L (ref 35–126)
ALT SERPL-CCNC: 12 IU/L (ref 16–63)
ANION GAP SERPL CALC-SCNC: 8 MEQ/L (ref 3–15)
AST SERPL-CCNC: 14 IU/L (ref 15–41)
BASOPHILS # BLD: 0.08 K/UL (ref 0.01–0.1)
BASOPHILS NFR BLD: 0.6 %
BILIRUB SERPL-MCNC: 0.8 MG/DL (ref 0.3–1.2)
BUN SERPL-MCNC: 20 MG/DL (ref 8–20)
CALCIUM SERPL-MCNC: 9.2 MG/DL (ref 8.9–10.3)
CALCIUM SERPL-MCNC: 9.2 MG/DL (ref 8.9–10.3)
CHLORIDE SERPL-SCNC: 102 MEQ/L (ref 98–109)
CO2 SERPL-SCNC: 28 MEQ/L (ref 22–32)
CREAT SERPL-MCNC: 1.2 MG/DL (ref 0.8–1.3)
DIFFERENTIAL METHOD BLD: ABNORMAL
EOSINOPHIL # BLD: 0.14 K/UL (ref 0.04–0.54)
EOSINOPHIL NFR BLD: 1.1 %
ERYTHROCYTE [DISTWIDTH] IN BLOOD BY AUTOMATED COUNT: 13.1 % (ref 11.6–14.4)
EST. AVERAGE GLUCOSE BLD GHB EST-MCNC: 148 MG/DL
GFR SERPL CREATININE-BSD FRML MDRD: 57.5 ML/MIN/1.73M*2
GLUCOSE SERPL-MCNC: 152 MG/DL (ref 70–99)
HBA1C MFR BLD HPLC: 6.8 %
HCT VFR BLDCO AUTO: 42.4 % (ref 40.1–51)
HGB BLD-MCNC: 13.4 G/DL (ref 13.7–17.5)
IMM GRANULOCYTES # BLD AUTO: 0.12 K/UL (ref 0–0.08)
IMM GRANULOCYTES NFR BLD AUTO: 1 %
LYMPHOCYTES # BLD: 0.66 K/UL (ref 1.2–3.5)
LYMPHOCYTES NFR BLD: 5.3 %
MCH RBC QN AUTO: 30.4 PG (ref 28–33.2)
MCHC RBC AUTO-ENTMCNC: 31.6 G/DL (ref 32.2–36.5)
MCV RBC AUTO: 96.1 FL (ref 83–98)
MONOCYTES # BLD: 0.5 K/UL (ref 0.3–1)
MONOCYTES NFR BLD: 4 %
NEUTROPHILS # BLD: 11.05 K/UL (ref 1.7–7)
NEUTS SEG NFR BLD: 88 %
NRBC BLD-RTO: 0 %
PDW BLD AUTO: 10.5 FL (ref 9.4–12.4)
PLATELET # BLD AUTO: 325 K/UL (ref 150–350)
POTASSIUM SERPL-SCNC: 4.8 MEQ/L (ref 3.6–5.1)
PROT SERPL-MCNC: 5.9 G/DL (ref 6–8.2)
PTH-INTACT SERPL-MCNC: 49.1 PG/ML (ref 12–88)
RBC # BLD AUTO: 4.41 M/UL (ref 4.5–5.8)
SODIUM SERPL-SCNC: 138 MEQ/L (ref 136–144)
WBC # BLD AUTO: 12.55 K/UL (ref 3.8–10.5)

## 2022-11-01 PROCEDURE — 83036 HEMOGLOBIN GLYCOSYLATED A1C: CPT

## 2022-11-01 PROCEDURE — 80053 COMPREHEN METABOLIC PANEL: CPT

## 2022-11-01 PROCEDURE — 36415 COLL VENOUS BLD VENIPUNCTURE: CPT

## 2022-11-01 PROCEDURE — 85025 COMPLETE CBC W/AUTO DIFF WBC: CPT

## 2022-11-01 PROCEDURE — 83970 ASSAY OF PARATHORMONE: CPT

## 2023-02-09 ENCOUNTER — TRANSCRIBE ORDERS (OUTPATIENT)
Dept: REGISTRATION | Facility: HOSPITAL | Age: 88
End: 2023-02-09

## 2023-02-09 ENCOUNTER — APPOINTMENT (OUTPATIENT)
Dept: LAB | Facility: HOSPITAL | Age: 88
End: 2023-02-09
Attending: INTERNAL MEDICINE
Payer: COMMERCIAL

## 2023-02-09 DIAGNOSIS — R30.0 DYSURIA: Primary | ICD-10-CM

## 2023-02-09 DIAGNOSIS — R30.0 DYSURIA: ICD-10-CM

## 2023-02-09 LAB
BACTERIA URNS QL MICRO: 1 /HPF
BILIRUB UR QL STRIP.AUTO: NEGATIVE MG/DL
CLARITY UR REFRACT.AUTO: ABNORMAL
COLOR UR AUTO: YELLOW
GLUCOSE UR STRIP.AUTO-MCNC: ABNORMAL MG/DL
HGB UR QL STRIP.AUTO: NEGATIVE
HYALINE CASTS #/AREA URNS LPF: ABNORMAL /LPF
KETONES UR STRIP.AUTO-MCNC: NEGATIVE MG/DL
LEUKOCYTE ESTERASE UR QL STRIP.AUTO: 3
MUCOUS THREADS URNS QL MICRO: ABNORMAL /LPF
NITRITE UR QL STRIP.AUTO: POSITIVE
PH UR STRIP.AUTO: 6.5 [PH]
PROT UR QL STRIP.AUTO: ABNORMAL
RBC #/AREA URNS HPF: ABNORMAL /HPF
SP GR UR REFRACT.AUTO: 1.02
SQUAMOUS URNS QL MICRO: ABNORMAL /HPF
UROBILINOGEN UR STRIP-ACNC: 0.2 EU/DL
WBC #/AREA URNS HPF: ABNORMAL /HPF

## 2023-02-09 PROCEDURE — 81001 URINALYSIS AUTO W/SCOPE: CPT

## 2023-02-09 PROCEDURE — 81003 URINALYSIS AUTO W/O SCOPE: CPT

## 2023-02-09 PROCEDURE — 87086 URINE CULTURE/COLONY COUNT: CPT

## 2023-02-11 LAB
BACTERIA UR CULT: ABNORMAL
BACTERIA UR CULT: ABNORMAL

## 2023-05-22 ENCOUNTER — APPOINTMENT (OUTPATIENT)
Dept: LAB | Facility: HOSPITAL | Age: 88
End: 2023-05-22
Attending: INTERNAL MEDICINE
Payer: COMMERCIAL

## 2023-05-22 ENCOUNTER — TRANSCRIBE ORDERS (OUTPATIENT)
Dept: LAB | Facility: HOSPITAL | Age: 88
End: 2023-05-22

## 2023-05-22 DIAGNOSIS — R53.83 OTHER FATIGUE: ICD-10-CM

## 2023-05-22 DIAGNOSIS — J44.9 CHRONIC OBSTRUCTIVE PULMONARY DISEASE, UNSPECIFIED: ICD-10-CM

## 2023-05-22 DIAGNOSIS — R53.83 OTHER FATIGUE: Primary | ICD-10-CM

## 2023-05-22 LAB
ALBUMIN SERPL-MCNC: 3.6 G/DL (ref 3.4–5)
ALP SERPL-CCNC: 49 IU/L (ref 35–126)
ALT SERPL-CCNC: 14 IU/L (ref 16–63)
ANION GAP SERPL CALC-SCNC: 9 MEQ/L (ref 3–15)
AST SERPL-CCNC: 12 IU/L (ref 15–41)
BASOPHILS # BLD: 0.12 K/UL (ref 0.01–0.1)
BASOPHILS NFR BLD: 1 %
BILIRUB SERPL-MCNC: 0.9 MG/DL (ref 0.3–1.2)
BUN SERPL-MCNC: 14 MG/DL (ref 8–20)
CALCIUM SERPL-MCNC: 9.5 MG/DL (ref 8.9–10.3)
CHLORIDE SERPL-SCNC: 98 MEQ/L (ref 98–109)
CO2 SERPL-SCNC: 31 MEQ/L (ref 22–32)
CREAT SERPL-MCNC: 1.3 MG/DL (ref 0.8–1.3)
DIFFERENTIAL METHOD BLD: ABNORMAL
EOSINOPHIL # BLD: 0.4 K/UL (ref 0.04–0.54)
EOSINOPHIL NFR BLD: 3.3 %
ERYTHROCYTE [DISTWIDTH] IN BLOOD BY AUTOMATED COUNT: 13.5 % (ref 11.6–14.4)
FOLATE SERPL-MCNC: >20 NG/ML
GFR SERPL CREATININE-BSD FRML MDRD: 52.5 ML/MIN/1.73M*2
GLUCOSE SERPL-MCNC: 144 MG/DL (ref 70–99)
HCT VFR BLDCO AUTO: 45 % (ref 40.1–51)
HGB BLD-MCNC: 14.4 G/DL (ref 13.7–17.5)
IMM GRANULOCYTES # BLD AUTO: 0.14 K/UL (ref 0–0.08)
IMM GRANULOCYTES NFR BLD AUTO: 1.2 %
LYMPHOCYTES # BLD: 1.93 K/UL (ref 1.2–3.5)
LYMPHOCYTES NFR BLD: 16 %
MCH RBC QN AUTO: 32.3 PG (ref 28–33.2)
MCHC RBC AUTO-ENTMCNC: 32 G/DL (ref 32.2–36.5)
MCV RBC AUTO: 100.9 FL (ref 83–98)
MONOCYTES # BLD: 1.5 K/UL (ref 0.3–1)
MONOCYTES NFR BLD: 12.5 %
NEUTROPHILS # BLD: 7.95 K/UL (ref 1.7–7)
NEUTS SEG NFR BLD: 66 %
NRBC BLD-RTO: 0 %
PDW BLD AUTO: 10.4 FL (ref 9.4–12.4)
PLATELET # BLD AUTO: 335 K/UL (ref 150–350)
POTASSIUM SERPL-SCNC: 4.5 MEQ/L (ref 3.6–5.1)
PROT SERPL-MCNC: 5.9 G/DL (ref 6–8.2)
RBC # BLD AUTO: 4.46 M/UL (ref 4.5–5.8)
SODIUM SERPL-SCNC: 138 MEQ/L (ref 136–144)
T4 FREE SERPL-MCNC: 0.77 NG/DL (ref 0.58–1.64)
TSH SERPL DL<=0.05 MIU/L-ACNC: 2.5 MIU/L (ref 0.34–5.6)
VIT B12 SERPL-MCNC: 654 PG/ML (ref 180–914)
WBC # BLD AUTO: 12.04 K/UL (ref 3.8–10.5)

## 2023-05-22 PROCEDURE — 80053 COMPREHEN METABOLIC PANEL: CPT

## 2023-05-22 PROCEDURE — 36415 COLL VENOUS BLD VENIPUNCTURE: CPT

## 2023-05-22 PROCEDURE — 84439 ASSAY OF FREE THYROXINE: CPT

## 2023-05-22 PROCEDURE — 84443 ASSAY THYROID STIM HORMONE: CPT

## 2023-05-22 PROCEDURE — 85025 COMPLETE CBC W/AUTO DIFF WBC: CPT

## 2023-05-22 PROCEDURE — 82746 ASSAY OF FOLIC ACID SERUM: CPT

## 2023-05-22 PROCEDURE — 82607 VITAMIN B-12: CPT

## 2024-01-01 NOTE — PLAN OF CARE
Problem: Adult Inpatient Plan of Care  Goal: Plan of Care Review  Flowsheets (Taken 1/10/2020 5836)  Progress: improving  Plan of Care Reviewed With: patient  Outcome Summary: As per CPR, patient maybe discharged over the weekend. Patient will go home with a new Non invasiveVentilation unit (Trilogy) provided from [facility]. [facility] is notified. Patient is agreeable to the dispo plan. [facility] completed.      invasive testing done, no concern for trisomies on physical exam  Fetal exposure to adipex in first trimester- fetal echo wnl  Baby voided and stooled in the first 24 hours of life, unremarkable  nursery stay.    Apgar scores:   APGAR One: 8  APGAR Five: 9  APGAR Ten: N/A      Discharge Weight:   Wt Readings from Last 1 Encounters:   10/27/24 3.475 kg (7 lb 10.6 oz) (55%, Z= 0.12)*     * Growth percentiles are based on [place] (Girls, 22-50 Weeks) data.     Birth weight change: -2%    Vitals:    10/27/24 0343   Pulse: 128   Resp: 58   Temp: 98.1 °F (36.7 °C)       General Appearance:  Healthy-appearing, vigorous infant, strong cry.  Skin: warm, dry, normal color, no rashes  Head:  Sutures mobile, fontanelles normal size, head normal size and shape  Eyes:  Sclerae white, pupils equal and reactive, red reflex normal bilaterally  Ears:  Well-positioned, well-formed pinnae;   Nose:  Clear, normal mucosa  Throat:  Lips, tongue and mucosa are pink, moist and intact; palate intact  Neck:  Supple, symmetrical  Chest:  Lungs clear to auscultation, respirations unlabored   Heart:  Regular rate & rhythm, S1 S2, no murmurs, rubs, or gallops, good femorals  Abdomen:  Soft, non-tender, no masses; no H/S megaly  Umbilicus: normal  Pulses:  Strong equal femoral pulses, brisk capillary refill  Hips:  Negative Li, Ortolani, gluteal creases equal, hips abduct fully and equally  :  normal female  Extremities:  Well-perfused, warm and dry  Neuro:  Easily aroused; good symmetric tone and strength; positive root and suck; symmetric normal reflexes    Procedures:  none    Hearing Screening:  Screening 1 Results: Right Ear Pass, Left Ear Pass    Consults: none    Transcutaneous Bilirubin Result: 6.2 at 24 hours of life; below treatment threshold    ABO/Rh   Date Value Ref Range Status   2024 O POSITIVE  Final     ZAIRA IgG   Date Value Ref Range Status   2024 NEGATIVE  Final         Right Arm Pulse Oximetry:   , SpO2:

## 2024-01-08 ENCOUNTER — APPOINTMENT (OUTPATIENT)
Dept: LAB | Facility: HOSPITAL | Age: 89
End: 2024-01-08
Attending: INTERNAL MEDICINE
Payer: COMMERCIAL

## 2024-01-08 ENCOUNTER — TRANSCRIBE ORDERS (OUTPATIENT)
Dept: LAB | Facility: HOSPITAL | Age: 89
End: 2024-01-08

## 2024-01-08 DIAGNOSIS — R35.89 OTHER POLYURIA: Primary | ICD-10-CM

## 2024-01-08 DIAGNOSIS — R35.89 OTHER POLYURIA: ICD-10-CM

## 2024-01-08 LAB
AMORPH CRY #/AREA URNS HPF: ABNORMAL /HPF
BACTERIA URNS QL MICRO: ABNORMAL /HPF
BILIRUB UR QL STRIP.AUTO: NEGATIVE MG/DL
CLARITY UR REFRACT.AUTO: CLEAR
COLOR UR AUTO: YELLOW
GLUCOSE UR STRIP.AUTO-MCNC: NEGATIVE MG/DL
HGB UR QL STRIP.AUTO: NEGATIVE
HYALINE CASTS #/AREA URNS LPF: ABNORMAL /LPF
KETONES UR STRIP.AUTO-MCNC: NEGATIVE MG/DL
LEUKOCYTE ESTERASE UR QL STRIP.AUTO: 3
MUCOUS THREADS URNS QL MICRO: ABNORMAL /LPF
NITRITE UR QL STRIP.AUTO: NEGATIVE
PH UR STRIP.AUTO: 6 [PH]
PROT UR QL STRIP.AUTO: ABNORMAL
RBC #/AREA URNS HPF: ABNORMAL /HPF
SP GR UR REFRACT.AUTO: 1.02
SQUAMOUS URNS QL MICRO: ABNORMAL /HPF
UROBILINOGEN UR STRIP-ACNC: 0.2 EU/DL
WBC #/AREA URNS HPF: ABNORMAL /HPF

## 2024-01-08 PROCEDURE — 87086 URINE CULTURE/COLONY COUNT: CPT

## 2024-01-08 PROCEDURE — 81001 URINALYSIS AUTO W/SCOPE: CPT

## 2024-01-08 PROCEDURE — 87077 CULTURE AEROBIC IDENTIFY: CPT

## 2024-01-10 LAB
BACTERIA UR CULT: ABNORMAL
BACTERIA UR CULT: ABNORMAL

## 2024-01-16 ENCOUNTER — APPOINTMENT (OUTPATIENT)
Dept: LAB | Facility: HOSPITAL | Age: 89
End: 2024-01-16
Attending: INTERNAL MEDICINE
Payer: COMMERCIAL

## 2024-01-16 ENCOUNTER — TRANSCRIBE ORDERS (OUTPATIENT)
Dept: LAB | Facility: HOSPITAL | Age: 89
End: 2024-01-16

## 2024-01-16 DIAGNOSIS — R73.01 IMPAIRED FASTING GLUCOSE: ICD-10-CM

## 2024-01-16 DIAGNOSIS — Z86.39 PERSONAL HISTORY OF OTHER ENDOCRINE, NUTRITIONAL AND METABOLIC DISEASE: ICD-10-CM

## 2024-01-16 DIAGNOSIS — E55.9 VITAMIN D DEFICIENCY, UNSPECIFIED: ICD-10-CM

## 2024-01-16 DIAGNOSIS — E78.2 MIXED HYPERLIPIDEMIA: ICD-10-CM

## 2024-01-16 DIAGNOSIS — E78.2 MIXED HYPERLIPIDEMIA: Primary | ICD-10-CM

## 2024-01-16 LAB
25(OH)D3 SERPL-MCNC: 28 NG/ML (ref 30–100)
ALBUMIN SERPL-MCNC: 3.9 G/DL (ref 3.5–5.7)
ALP SERPL-CCNC: 62 IU/L (ref 34–125)
ALT SERPL-CCNC: 14 IU/L (ref 7–52)
ANION GAP SERPL CALC-SCNC: 11 MEQ/L (ref 3–15)
AST SERPL-CCNC: 13 IU/L (ref 13–39)
BASOPHILS # BLD: 0 K/UL (ref 0.01–0.1)
BASOPHILS NFR BLD: 0 %
BILIRUB SERPL-MCNC: 0.6 MG/DL (ref 0.3–1.2)
BUN SERPL-MCNC: 29 MG/DL (ref 7–25)
BURR CELLS BLD QL SMEAR: ABNORMAL
CALCIUM SERPL-MCNC: 9.7 MG/DL (ref 8.6–10.3)
CHLORIDE SERPL-SCNC: 100 MEQ/L (ref 98–107)
CHOLEST SERPL-MCNC: 145 MG/DL
CO2 SERPL-SCNC: 27 MEQ/L (ref 21–31)
CREAT SERPL-MCNC: 1.4 MG/DL (ref 0.7–1.3)
CREAT UR-MCNC: 75.3 MG/DL
DACRYOCYTES BLD QL SMEAR: ABNORMAL
DIFFERENTIAL METHOD BLD: ABNORMAL
EGFRCR SERPLBLD CKD-EPI 2021: 48.9 ML/MIN/1.73M*2
EOSINOPHIL # BLD: 0.24 K/UL (ref 0.04–0.54)
EOSINOPHIL NFR BLD: 1 %
ERYTHROCYTE [DISTWIDTH] IN BLOOD BY AUTOMATED COUNT: 13.6 % (ref 11.6–14.4)
EST. AVERAGE GLUCOSE BLD GHB EST-MCNC: 174 MG/DL
GLUCOSE SERPL-MCNC: 134 MG/DL (ref 70–99)
HBA1C MFR BLD: 7.7 %
HCT VFR BLDCO AUTO: 43.6 % (ref 40.1–51)
HDLC SERPL-MCNC: 46 MG/DL
HDLC SERPL: 3.2 {RATIO}
HGB BLD-MCNC: 13.8 G/DL (ref 13.7–17.5)
LDLC SERPL CALC-MCNC: 67 MG/DL
LYMPHOCYTES # BLD: 0.24 K/UL (ref 1.2–3.5)
LYMPHOCYTES NFR BLD: 1 %
MCH RBC QN AUTO: 30.5 PG (ref 28–33.2)
MCHC RBC AUTO-ENTMCNC: 31.7 G/DL (ref 32.2–36.5)
MCV RBC AUTO: 96.5 FL (ref 83–98)
MICROALBUMIN UR-MCNC: 15.1 MG/L
MICROALBUMIN/CREAT UR: 20.1 UG/MG
MONOCYTES # BLD: 1.18 K/UL (ref 0.3–1)
MONOCYTES NFR BLD: 5 %
NEUTS BAND # BLD: 0.47 K/UL (ref 0–0.53)
NEUTS BAND # BLD: 21.39 K/UL (ref 1.7–7)
NEUTS BAND NFR BLD: 2 %
NEUTS SEG NFR BLD: 91 %
NEUTS VAC BLD QL SMEAR: ABNORMAL
NONHDLC SERPL-MCNC: 99 MG/DL
OVALOCYTES BLD QL SMEAR: ABNORMAL
PDW BLD AUTO: 10.6 FL (ref 9.4–12.4)
PLAT MORPH BLD: NORMAL
PLATELET # BLD AUTO: 343 K/UL (ref 150–350)
PLATELET # BLD EST: ABNORMAL 10*3/UL
PLATELET CLUMP BLD QL SMEAR: PRESENT
POLYCHROMASIA BLD QL SMEAR: ABNORMAL
POTASSIUM SERPL-SCNC: 4.6 MEQ/L (ref 3.5–5.1)
PROT SERPL-MCNC: 6.7 G/DL (ref 6–8.2)
RBC # BLD AUTO: 4.52 M/UL (ref 4.5–5.8)
SODIUM SERPL-SCNC: 138 MEQ/L (ref 136–145)
T4 FREE SERPL-MCNC: 0.81 NG/DL (ref 0.58–1.64)
TRIGL SERPL-MCNC: 159 MG/DL
TSH SERPL DL<=0.05 MIU/L-ACNC: 2.21 MIU/L (ref 0.34–5.6)
WBC # BLD AUTO: 23.51 K/UL (ref 3.8–10.5)

## 2024-01-16 PROCEDURE — 36415 COLL VENOUS BLD VENIPUNCTURE: CPT

## 2024-01-16 PROCEDURE — 85025 COMPLETE CBC W/AUTO DIFF WBC: CPT

## 2024-01-16 PROCEDURE — 80061 LIPID PANEL: CPT

## 2024-01-16 PROCEDURE — 80053 COMPREHEN METABOLIC PANEL: CPT

## 2024-01-16 PROCEDURE — 82570 ASSAY OF URINE CREATININE: CPT

## 2024-01-16 PROCEDURE — 84443 ASSAY THYROID STIM HORMONE: CPT

## 2024-01-16 PROCEDURE — 82306 VITAMIN D 25 HYDROXY: CPT

## 2024-01-16 PROCEDURE — 84439 ASSAY OF FREE THYROXINE: CPT

## 2024-01-16 PROCEDURE — 83036 HEMOGLOBIN GLYCOSYLATED A1C: CPT

## 2024-07-03 ENCOUNTER — APPOINTMENT (EMERGENCY)
Dept: RADIOLOGY | Facility: HOSPITAL | Age: 89
End: 2024-07-03
Attending: EMERGENCY MEDICINE
Payer: COMMERCIAL

## 2024-07-03 ENCOUNTER — HOSPITAL ENCOUNTER (EMERGENCY)
Facility: HOSPITAL | Age: 89
Discharge: HOME | End: 2024-07-04
Attending: EMERGENCY MEDICINE
Payer: COMMERCIAL

## 2024-07-03 DIAGNOSIS — T14.8XXA SKIN AVULSION: ICD-10-CM

## 2024-07-03 DIAGNOSIS — T07.XXXA MULTIPLE LACERATIONS: ICD-10-CM

## 2024-07-03 DIAGNOSIS — S09.90XA HEAD INJURY, INITIAL ENCOUNTER: ICD-10-CM
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
## 2024-07-03 LAB
ANION GAP SERPL CALC-SCNC: 7 MEQ/L (ref 3–15)
BASOPHILS # BLD: 0.13 K/UL (ref 0.01–0.1)
BASOPHILS NFR BLD: 0.9 %
BUN SERPL-MCNC: 32 MG/DL (ref 7–25)
CALCIUM SERPL-MCNC: 9.9 MG/DL (ref 8.6–10.3)
CHLORIDE SERPL-SCNC: 98 MEQ/L (ref 98–107)
CO2 SERPL-SCNC: 32 MEQ/L (ref 21–31)
CREAT SERPL-MCNC: 1.5 MG/DL (ref 0.7–1.3)
DIFFERENTIAL METHOD BLD: ABNORMAL
EGFRCR SERPLBLD CKD-EPI 2021: 45.1 ML/MIN/1.73M*2
EOSINOPHIL # BLD: 0.34 K/UL (ref 0.04–0.54)
EOSINOPHIL NFR BLD: 2.2 %
ERYTHROCYTE [DISTWIDTH] IN BLOOD BY AUTOMATED COUNT: 13.5 % (ref 11.6–14.4)
GLUCOSE BLD-MCNC: 217 MG/DL (ref 70–99)
GLUCOSE SERPL-MCNC: 208 MG/DL (ref 70–99)
HCT VFR BLD AUTO: 48.6 % (ref 40.1–51)
HGB BLD-MCNC: 15.4 G/DL (ref 13.7–17.5)
IMM GRANULOCYTES # BLD AUTO: 0.33 K/UL (ref 0–0.08)
IMM GRANULOCYTES NFR BLD AUTO: 2.2 %
LYMPHOCYTES # BLD: 1.07 K/UL (ref 1.2–3.5)
LYMPHOCYTES NFR BLD: 7 %
MCH RBC QN AUTO: 31.4 PG (ref 28–33.2)
MCHC RBC AUTO-ENTMCNC: 31.7 G/DL (ref 32.2–36.5)
MCV RBC AUTO: 99.2 FL (ref 83–98)
MONOCYTES # BLD: 1.11 K/UL (ref 0.3–1)
MONOCYTES NFR BLD: 7.3 %
NEUTROPHILS # BLD: 12.21 K/UL (ref 1.7–7)
NEUTS SEG NFR BLD: 80.4 %
NRBC BLD-RTO: 0 %
PDW BLD AUTO: 10.4 FL (ref 9.4–12.4)
PLATELET # BLD AUTO: 337 K/UL (ref 150–350)
POCT TEST: ABNORMAL
POTASSIUM SERPL-SCNC: 4.6 MEQ/L (ref 3.5–5.1)
RBC # BLD AUTO: 4.9 M/UL (ref 4.5–5.8)
SODIUM SERPL-SCNC: 137 MEQ/L (ref 136–145)
WBC # BLD AUTO: 15.19 K/UL (ref 3.8–10.5)

## 2024-07-03 PROCEDURE — 72125 CT NECK SPINE W/O DYE: CPT

## 2024-07-03 PROCEDURE — 0JQK3ZZ REPAIR LEFT HAND SUBCUTANEOUS TISSUE AND FASCIA, PERCUTANEOUS APPROACH: ICD-10-PCS | Performed by: EMERGENCY MEDICINE

## 2024-07-03 PROCEDURE — 93005 ELECTROCARDIOGRAM TRACING: CPT | Performed by: EMERGENCY MEDICINE

## 2024-07-03 PROCEDURE — 73130 X-RAY EXAM OF HAND: CPT | Mod: LT

## 2024-07-03 PROCEDURE — 36415 COLL VENOUS BLD VENIPUNCTURE: CPT

## 2024-07-03 PROCEDURE — 70450 CT HEAD/BRAIN W/O DYE: CPT

## 2024-07-03 PROCEDURE — 73110 X-RAY EXAM OF WRIST: CPT | Mod: LT

## 2024-07-03 PROCEDURE — 73080 X-RAY EXAM OF ELBOW: CPT | Mod: RT

## 2024-07-03 PROCEDURE — 80048 BASIC METABOLIC PNL TOTAL CA: CPT | Performed by: EMERGENCY MEDICINE

## 2024-07-03 PROCEDURE — 85025 COMPLETE CBC W/AUTO DIFF WBC: CPT | Performed by: EMERGENCY MEDICINE

## 2024-07-03 PROCEDURE — 99284 EMERGENCY DEPT VISIT MOD MDM: CPT | Mod: 25

## 2024-07-03 ASSESSMENT — ENCOUNTER SYMPTOMS
WOUND: 1
FEVER: 0
ABDOMINAL PAIN: 0
HEADACHES: 0
SHORTNESS OF BREATH: 1

## 2024-07-04 VITALS
BODY MASS INDEX: 28.49 KG/M2 | TEMPERATURE: 98.1 F | HEART RATE: 113 BPM | WEIGHT: 171 LBS | RESPIRATION RATE: 16 BRPM | OXYGEN SATURATION: 99 % | SYSTOLIC BLOOD PRESSURE: 155 MMHG | HEIGHT: 65 IN | DIASTOLIC BLOOD PRESSURE: 74 MMHG

## 2024-07-04 RX ORDER — LIDOCAINE HYDROCHLORIDE 10 MG/ML
10 INJECTION, SOLUTION EPIDURAL; INFILTRATION; INTRACAUDAL; PERINEURAL ONCE
Status: DISCONTINUED | OUTPATIENT
Start: 2024-07-04 | End: 2024-07-04 | Stop reason: HOSPADM

## 2024-07-04 NOTE — ED PROVIDER NOTES
Emergency Medicine Note  HPI   HISTORY OF PRESENT ILLNESS     Patient is an 88-year-old male history of COPD, on chronic oxygen, CKD, hypertension, prostate cancer who presents after falling out of bed.  She states he was asleep with his CPAP machine and rolled out of bed and hit the end table.  He did hit his head but denies any loss of consciousness, blurry vision, neck pain, or nausea vomiting.  He was able to get up on his own.  He has numerous abrasions and skin tears.  Per family, he has been on chronic prednisone and has very thin skin.  He believes his tetanus shot is up-to-date.      History provided by:  Patient, spouse and relative  Trauma  Mechanism of injury: Fall     Current symptoms:       Associated symptoms:             Denies abdominal pain, chest pain and headache.         Patient History   PAST HISTORY     Reviewed from Nursing Triage:       Past Medical History:   Diagnosis Date    Chronic kidney disease     COPD (chronic obstructive pulmonary disease) (CMS/HCC)     Diverticulitis of colon         Emphysema lung (CMS/HCC)     Hypertension     Prostate cancer (CMS/HCC)      s/p RT withouut recurrence       Past Surgical History:   Procedure Laterality Date    COLON SURGERY      MULTIPLE TOOTH EXTRACTIONS  Sept.-2018    NASAL SEPTUM SURGERY      SINUS SURGERY  2012 by Dr. Mora       Family History   Problem Relation Age of Onset    Melanoma Biological Mother     Melanoma Biological Sister        Social History     Tobacco Use    Smoking status: Former     Packs/day: 2.00     Years: 40.00     Additional pack years: 0.00     Total pack years: 80.00     Types: Cigarettes     Start date: 1954     Quit date: 1993     Years since quittin.9    Smokeless tobacco: Never   Substance Use Topics    Alcohol use: No     Comment: stopped in     Drug use: No         Review of Systems   REVIEW OF SYSTEMS     Review of Systems   Constitutional:  Negative for fever.    Eyes:  Negative for visual disturbance.   Respiratory:  Positive for shortness of breath (Chronic but not worse than baseline).    Cardiovascular:  Negative for chest pain.   Gastrointestinal:  Negative for abdominal pain.   Musculoskeletal:         Left hand and right elbow pain   Skin:  Positive for wound.   Neurological:  Negative for headaches.         VITALS     ED Vitals      Date/Time Temp Pulse Resp BP SpO2 [facility]   07/03/24 2147 36.7 °C (98.1 °F) 100 16 155/79 100 % RMF                         Physical Exam   PHYSICAL EXAM     Physical Exam  Constitutional:       Comments: Chronically ill-appearing elderly male on oxygen   HENT:      Head:      Comments: Small frontal hematoma     Nose: Nose normal.   Eyes:      Conjunctiva/sclera: Conjunctivae normal.   Cardiovascular:      Rate and Rhythm: Normal rate.      Pulses: Normal pulses.   Pulmonary:      Effort: Pulmonary effort is normal. No respiratory distress.   Abdominal:      Tenderness: There is no abdominal tenderness.   Musculoskeletal:         General: No deformity.      Cervical back: Normal range of motion.      Comments: Superficial skin tear right posterior elbow  Skin tears and abrasions bilateral anterior knees  Large skin tear/skin avulsion dorsal aspect of proximal left thumb and proximal lateral index finger left hand  Full range of motion of the joints  Neurovascular intact   Skin:     General: Skin is warm and dry.   Neurological:      General: No focal deficit present.      Mental Status: He is alert and oriented to person, place, and time. Mental status is at baseline.   Psychiatric:         Mood and Affect: Mood normal.         Behavior: Behavior normal.         Thought Content: Thought content normal.         PROCEDURES     Procedures     DATA     Results       Procedure Component Value Units Date/Time    JOHN KENYON [[id number]] Collected: 07/03/24 2225    Specimen: Blood, Venous Updated: 07/03/24 2231    RAINBOW GOLD [[id number]]  Collected: 07/03/24 2225    Specimen: Blood, Venous Updated: 07/03/24 2231    RAINBOW RED [[id number]] Collected: 07/03/24 2226    Specimen: Blood, Venous Updated: 07/03/24 2231    RAINBOW LT GREEN [[id number]] Collected: 07/03/24 2225    Specimen: Blood, Venous Updated: 07/03/24 2231    RAINBOW PINK [[id number]] Collected: 07/03/24 2225    Specimen: Blood, Venous Updated: 07/03/24 2231    RAINBOW LAVENDER [[id number]] Collected: 07/03/24 2225    Specimen: Blood, Venous Updated: 07/03/24 2231    [place] Draw Panel [[id number]] Collected: 07/03/24 2225    Specimen: Blood, Venous Updated: 07/03/24 2231    Narrative:      The following orders were created for panel order [place] Draw Panel.  Procedure                               Abnormality         Status                     ---------                               -----------         ------                     RAINBOW RED[[id number]]                                      In process                 RAINBOW PINK[[id number]]                                     In process                 RAINBOW LAVENDER[[id number]]                                 In process                 RAINBOW LT BLUE[[id number]]                                  In process                 RAINBOW GOLD[[id number]]                                     In process                 RAINBOW LT GREEN[[id number]]                                 In process                 RAINBOW LT GREEN[[id number]]                                 In process                   Please view results for these tests on the individual orders.    RAINBOW LT BLUE [[id number]] Collected: 07/03/24 2225    Specimen: Blood, Venous Updated: 07/03/24 2231            Imaging Results    None         No orders to display       Scoring tools                                  ED Course & MDM   MDM / ED COURSE / CLINICAL IMPRESSION / DISPO     Medical Decision Making  Patient is status post right a fall from [place] to bed.  He is neurologically at baseline.  Has  multiple numerous skin tears and avulsions  CT head stable.  PA repaired his wounds and I will see any acute fracture on imaging.  I have concerns about his heart rate and family reports patient is close to hospice and he is always tachycardic and tachypneic and that they would prefer discharge and no further evaluation.  With shared decision making patient will be discharged for close outpatient follow-up and strict return precautions.    Problems Addressed:  Fall, initial encounter: acute illness or injury  Head injury, initial encounter: acute illness or injury  Multiple lacerations: acute illness or injury  Skin avulsion: acute illness or injury    Amount and/or Complexity of Data Reviewed  Independent Historian: spouse  External Data Reviewed: notes.     Details: DC summary January 2020  Labs: ordered. Decision-making details documented in ED Course.  Radiology: ordered and independent interpretation performed. Decision-making details documented in ED Course.  ECG/medicine tests: ordered and independent interpretation performed. Decision-making details documented in ED Course.        ED Course as of 07/04/24 0247   Thu Jul 04, 2024   0047 WBC(!): 15.19  Leukocytosis consistent with prior (patient on chronic steroids) [DP]   [facility]     [facility]  Teleradiology Cases    This communication is confidential. The information contained herein is protected from unauthorized use by privilege or law. Copying, distribution, disclosure, or other use of this information is prohibited. You are required to destroy this communication if you have received it in error.      Patient Name: mel baez  Exam(s): C spine CT  head CT  MR#: [id number]       History: fall    Preliminary Impression:    Comparison: CT head 3/20/2017    Head:  No evidence of hemorrhage, mass-effect or acute territorial infarction by CT criteria.  Chronic involutional changes, evidence of chronic small vessel ischemic disease and intracranial  atherosclerosis.  No acute calvarial fracture.  Tiny right frontal scalp contusion.  Partial opacification of bilateral mastoid air cells, right greater than left; please correlate clinically for mastoiditis.      C-spine:  No evidence of acute C-spine fracture or traumatic subluxation.  Multilevel spine degenerative changes.  Grade 1 anterolisthesis of C2 on C3, likely degenerative in etiology.                Interpreted by: Tay Garcia MD, Jul 04, 2024 02:40 AM     [DP]   0246 ECG  Sinus tachycardia 103 bpm  No acute ST changes  No STEMI [DP]      ED Course User Index  [DP] Miguelito Calloway MD     Clinical Impression      None                 Miguelito Calloway MD  07/04/24 0636

## 2024-07-04 NOTE — ED PROVIDER NOTES
Laceration Repair    Date/Time: 7/8/2024 11:35 AM    Performed by: Livan Ardon PA C  Authorized by: Miguelito Calloway MD    Consent:     Consent obtained:  Verbal    Consent given by:  Guardian and patient    Risks, benefits, and alternatives were discussed: yes      Risks discussed:  Infection, need for additional repair, nerve damage, poor wound healing, poor cosmetic result and pain  Universal protocol:     Procedure explained and questions answered to patient or proxy's satisfaction: yes      Patient identity confirmed:  Verbally with patient and arm band  Anesthesia:     Anesthesia method:  Local infiltration    Local anesthetic:  Lidocaine 1% w/o epi  Laceration details:     Location: Left hand, left knee, right forearm.  Pre-procedure details:     Preparation:  Patient was prepped and draped in usual sterile fashion  Treatment:     Area cleansed with:  Povidone-iodine    Amount of cleaning:  Standard    Irrigation solution:  Sterile saline    Irrigation volume:  1 L    Debridement:  Minimal    Undermining:  None    Scar revision: no    Skin repair:     Repair method:  Sutures    Suture size:  4-0    Suture material:  Nylon    Suture technique:  Simple interrupted    Number of sutures:  29 (20 sutures placed on left hand.  6 sutures placed on left knee.  3 sutures placed on right forearm)  Approximation:     Approximation:  Close  Repair type:     Repair type:  Intermediate  Post-procedure details:     Dressing:  Open (no dressing)    Procedure completion:  Tolerated      12 cm laceration present on left hand 20 sutures in the left hand.    2.5 cm laceration present on left knee.  6 sutures on the left knee    4 cm laceration present on right forearm 3  sutures in the right forearm.           Livan Ardon PA C  07/08/24 1137

## 2024-07-05 LAB
ATRIAL RATE: 103
P AXIS: 81
PR INTERVAL: 166
QRS DURATION: 74
QT INTERVAL: 362
QTC CALCULATION(BAZETT): 474
R AXIS: -79
T WAVE AXIS: 67
VENTRICULAR RATE: 103

## 2024-07-05 PROCEDURE — 93010 ELECTROCARDIOGRAM REPORT: CPT | Performed by: INTERNAL MEDICINE

## 2025-01-03 NOTE — ASSESSMENT & PLAN NOTE
Baseline Cr 1.4-1.5, currently 1.2  2/2 AIN/DRESS from vanco    - monitor BMP  - cautious with IV contrast   Render In Strict Bullet Format?: No Continue Regimen: 5FU on both hands twice daily 2-4 weeks. Do one hand at time. Detail Level: Zone

## 2025-06-23 NOTE — PROCEDURE: LIQUID NITROGEN
Duration Of Freeze Thaw-Cycle (Seconds): 3
Post-Care Instructions: I reviewed with the patient in detail post-care instructions. Patient is to wear sunprotection, and avoid picking at any of the treated lesions. Pt may apply Vaseline to crusted or scabbing areas.
Render Post-Care Instructions In Note?: no
Number Of Freeze-Thaw Cycles: 2 freeze-thaw cycles
Consent: The patient's consent was obtained including but not limited to risks of crusting, scabbing, blistering, scarring, darker or lighter pigmentary change, recurrence, incomplete removal and infection.
Detail Level: Detailed
warm and dry/color normal/normal/no rashes/no ulcers